# Patient Record
Sex: MALE | Employment: UNEMPLOYED | URBAN - METROPOLITAN AREA
[De-identification: names, ages, dates, MRNs, and addresses within clinical notes are randomized per-mention and may not be internally consistent; named-entity substitution may affect disease eponyms.]

---

## 2019-04-06 ENCOUNTER — HOSPITAL ENCOUNTER (INPATIENT)
Facility: CLINIC | Age: 59
LOS: 2 days | Discharge: NURSING, REHAB OR LONG-TERM CARE | End: 2019-04-08
Attending: PSYCHIATRY & NEUROLOGY | Admitting: PSYCHIATRY & NEUROLOGY
Payer: COMMERCIAL

## 2019-04-06 ENCOUNTER — HOSPITAL ENCOUNTER (EMERGENCY)
Facility: CLINIC | Age: 59
Discharge: PSYCHIATRIC OR SUBSTANCE USE FACILITY | End: 2019-04-06
Attending: EMERGENCY MEDICINE | Admitting: EMERGENCY MEDICINE
Payer: COMMERCIAL

## 2019-04-06 VITALS
RESPIRATION RATE: 18 BRPM | OXYGEN SATURATION: 98 % | HEIGHT: 73 IN | DIASTOLIC BLOOD PRESSURE: 81 MMHG | HEART RATE: 91 BPM | BODY MASS INDEX: 32.6 KG/M2 | TEMPERATURE: 99.3 F | WEIGHT: 246 LBS | SYSTOLIC BLOOD PRESSURE: 145 MMHG

## 2019-04-06 DIAGNOSIS — R45.851 SUICIDAL IDEATION: ICD-10-CM

## 2019-04-06 DIAGNOSIS — E11.8 TYPE 2 DIABETES MELLITUS WITH COMPLICATION, WITHOUT LONG-TERM CURRENT USE OF INSULIN (H): Primary | ICD-10-CM

## 2019-04-06 DIAGNOSIS — F22 PARANOIA (H): ICD-10-CM

## 2019-04-06 LAB
ANION GAP SERPL CALCULATED.3IONS-SCNC: 6 MMOL/L (ref 3–14)
BUN SERPL-MCNC: 11 MG/DL (ref 7–30)
CALCIUM SERPL-MCNC: 8.6 MG/DL (ref 8.5–10.1)
CHLORIDE SERPL-SCNC: 107 MMOL/L (ref 94–109)
CO2 SERPL-SCNC: 26 MMOL/L (ref 20–32)
CREAT SERPL-MCNC: 1.02 MG/DL (ref 0.66–1.25)
ERYTHROCYTE [DISTWIDTH] IN BLOOD BY AUTOMATED COUNT: 13.1 % (ref 10–15)
GFR SERPL CREATININE-BSD FRML MDRD: 81 ML/MIN/{1.73_M2}
GLUCOSE BLDC GLUCOMTR-MCNC: 144 MG/DL (ref 70–99)
GLUCOSE BLDC GLUCOMTR-MCNC: 173 MG/DL (ref 70–99)
GLUCOSE BLDC GLUCOMTR-MCNC: 230 MG/DL (ref 70–99)
GLUCOSE SERPL-MCNC: 122 MG/DL (ref 70–99)
HBA1C MFR BLD: 8.2 % (ref 0–5.6)
HCT VFR BLD AUTO: 37.9 % (ref 40–53)
HGB BLD-MCNC: 13.4 G/DL (ref 13.3–17.7)
MCH RBC QN AUTO: 30 PG (ref 26.5–33)
MCHC RBC AUTO-ENTMCNC: 35.4 G/DL (ref 31.5–36.5)
MCV RBC AUTO: 85 FL (ref 78–100)
PLATELET # BLD AUTO: 220 10E9/L (ref 150–450)
POTASSIUM SERPL-SCNC: 3.3 MMOL/L (ref 3.4–5.3)
RBC # BLD AUTO: 4.47 10E12/L (ref 4.4–5.9)
SODIUM SERPL-SCNC: 139 MMOL/L (ref 133–144)
WBC # BLD AUTO: 7.9 10E9/L (ref 4–11)

## 2019-04-06 PROCEDURE — 00000146 ZZHCL STATISTIC GLUCOSE BY METER IP

## 2019-04-06 PROCEDURE — 90791 PSYCH DIAGNOSTIC EVALUATION: CPT

## 2019-04-06 PROCEDURE — 99223 1ST HOSP IP/OBS HIGH 75: CPT | Mod: AI | Performed by: PSYCHIATRY & NEUROLOGY

## 2019-04-06 PROCEDURE — 12400002 ZZH R&B MH SENIOR/ADOLESCENT

## 2019-04-06 PROCEDURE — 25000132 ZZH RX MED GY IP 250 OP 250 PS 637: Performed by: PHYSICIAN ASSISTANT

## 2019-04-06 PROCEDURE — 85027 COMPLETE CBC AUTOMATED: CPT | Performed by: EMERGENCY MEDICINE

## 2019-04-06 PROCEDURE — 99285 EMERGENCY DEPT VISIT HI MDM: CPT | Mod: 25

## 2019-04-06 PROCEDURE — 80048 BASIC METABOLIC PNL TOTAL CA: CPT | Performed by: EMERGENCY MEDICINE

## 2019-04-06 PROCEDURE — 99232 SBSQ HOSP IP/OBS MODERATE 35: CPT | Performed by: PHYSICIAN ASSISTANT

## 2019-04-06 PROCEDURE — 83036 HEMOGLOBIN GLYCOSYLATED A1C: CPT | Performed by: EMERGENCY MEDICINE

## 2019-04-06 PROCEDURE — 99207 ZZC CDG-HISTORY COMP: MEETS EXP. PROBLEM FOCUSED - DOWN CODED LACK OF HPI: CPT | Performed by: PHYSICIAN ASSISTANT

## 2019-04-06 PROCEDURE — 25000132 ZZH RX MED GY IP 250 OP 250 PS 637: Performed by: PSYCHIATRY & NEUROLOGY

## 2019-04-06 RX ORDER — OLANZAPINE 10 MG/1
10 TABLET ORAL
Status: DISCONTINUED | OUTPATIENT
Start: 2019-04-06 | End: 2019-04-08 | Stop reason: HOSPADM

## 2019-04-06 RX ORDER — HYDROXYZINE HYDROCHLORIDE 25 MG/1
25 TABLET, FILM COATED ORAL EVERY 4 HOURS PRN
Status: DISCONTINUED | OUTPATIENT
Start: 2019-04-06 | End: 2019-04-08 | Stop reason: HOSPADM

## 2019-04-06 RX ORDER — OLANZAPINE 10 MG/2ML
10 INJECTION, POWDER, FOR SOLUTION INTRAMUSCULAR
Status: DISCONTINUED | OUTPATIENT
Start: 2019-04-06 | End: 2019-04-08 | Stop reason: HOSPADM

## 2019-04-06 RX ORDER — DEXTROSE MONOHYDRATE 25 G/50ML
25-50 INJECTION, SOLUTION INTRAVENOUS
Status: DISCONTINUED | OUTPATIENT
Start: 2019-04-06 | End: 2019-04-08 | Stop reason: HOSPADM

## 2019-04-06 RX ORDER — ACETAMINOPHEN 325 MG/1
650 TABLET ORAL EVERY 4 HOURS PRN
Status: DISCONTINUED | OUTPATIENT
Start: 2019-04-06 | End: 2019-04-08 | Stop reason: HOSPADM

## 2019-04-06 RX ORDER — TRAZODONE HYDROCHLORIDE 50 MG/1
50 TABLET, FILM COATED ORAL
Status: DISCONTINUED | OUTPATIENT
Start: 2019-04-06 | End: 2019-04-08 | Stop reason: HOSPADM

## 2019-04-06 RX ORDER — GABAPENTIN 600 MG/1
600 TABLET ORAL 3 TIMES DAILY
Status: DISCONTINUED | OUTPATIENT
Start: 2019-04-06 | End: 2019-04-08 | Stop reason: HOSPADM

## 2019-04-06 RX ORDER — BISACODYL 10 MG
10 SUPPOSITORY, RECTAL RECTAL DAILY PRN
Status: DISCONTINUED | OUTPATIENT
Start: 2019-04-06 | End: 2019-04-08 | Stop reason: HOSPADM

## 2019-04-06 RX ORDER — ALUMINA, MAGNESIA, AND SIMETHICONE 2400; 2400; 240 MG/30ML; MG/30ML; MG/30ML
30 SUSPENSION ORAL EVERY 4 HOURS PRN
Status: DISCONTINUED | OUTPATIENT
Start: 2019-04-06 | End: 2019-04-08 | Stop reason: HOSPADM

## 2019-04-06 RX ORDER — ARIPIPRAZOLE 5 MG/1
10 TABLET ORAL DAILY
Status: DISCONTINUED | OUTPATIENT
Start: 2019-04-07 | End: 2019-04-08 | Stop reason: HOSPADM

## 2019-04-06 RX ORDER — GABAPENTIN 100 MG/1
CAPSULE ORAL
Status: ON HOLD | COMMUNITY
End: 2019-04-08

## 2019-04-06 RX ORDER — NICOTINE POLACRILEX 4 MG
15-30 LOZENGE BUCCAL
Status: DISCONTINUED | OUTPATIENT
Start: 2019-04-06 | End: 2019-04-08 | Stop reason: HOSPADM

## 2019-04-06 RX ORDER — POTASSIUM CHLORIDE 1500 MG/1
40 TABLET, EXTENDED RELEASE ORAL ONCE
Status: COMPLETED | OUTPATIENT
Start: 2019-04-06 | End: 2019-04-06

## 2019-04-06 RX ADMIN — GABAPENTIN 600 MG: 600 TABLET, FILM COATED ORAL at 20:48

## 2019-04-06 RX ADMIN — METFORMIN HYDROCHLORIDE 1000 MG: 500 TABLET, FILM COATED ORAL at 17:52

## 2019-04-06 RX ADMIN — POTASSIUM CHLORIDE 40 MEQ: 20 TABLET, EXTENDED RELEASE ORAL at 16:39

## 2019-04-06 RX ADMIN — GABAPENTIN 600 MG: 600 TABLET, FILM COATED ORAL at 16:39

## 2019-04-06 SDOH — HEALTH STABILITY: MENTAL HEALTH: HOW OFTEN DO YOU HAVE A DRINK CONTAINING ALCOHOL?: NEVER

## 2019-04-06 ASSESSMENT — ACTIVITIES OF DAILY LIVING (ADL)
AMBULATION: 0-->INDEPENDENT
SWALLOWING: 0-->SWALLOWS FOODS/LIQUIDS WITHOUT DIFFICULTY
COGNITION: 0 - NO COGNITION ISSUES REPORTED
HYGIENE/GROOMING: INDEPENDENT
TOILETING: 0-->INDEPENDENT
RETIRED_EATING: 0-->INDEPENDENT
RETIRED_COMMUNICATION: 0-->UNDERSTANDS/COMMUNICATES WITHOUT DIFFICULTY
LAUNDRY: WITH SUPERVISION
DRESS: INDEPENDENT
BATHING: 0-->INDEPENDENT
FALL_HISTORY_WITHIN_LAST_SIX_MONTHS: NO
DRESS: 0-->INDEPENDENT
TRANSFERRING: 0-->INDEPENDENT
ORAL_HYGIENE: INDEPENDENT

## 2019-04-06 ASSESSMENT — MIFFLIN-ST. JEOR
SCORE: 1999.73
SCORE: 1931.69

## 2019-04-06 ASSESSMENT — ENCOUNTER SYMPTOMS
CONFUSION: 0
NERVOUS/ANXIOUS: 1
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
## 2019-04-06 NOTE — CONSULTS
"  [facility]  Internal Medicine Consult     Brody Colindres MRN# [id number]   Age: 58 year old YOB: 1960     Date of Admission: 4/6/2019  Date of Consult: 4/6/2019    Primary Care Provider: No primary care provider on file.    Requesting Service: Dr. Rcie  Reason for Consult: General Medical Evaluation      SUBJECTIVE   CC:   DMII   Assessment and Plan/Recommendations:   Brody Colindres is a 58 year old male with history of DMII, chronic pain, and paranoia who was admitted to station 3[facility] with worsening paranoia    Worsening paranoia: Presented to the ED with concerns of someone trying to kill him. Per notes was hospitalized in [place] last year with the same concerns   - Management per psych     DMII: PTA on Metformin only. No A1c on file. Glucose stable  - Please resume Metformin  - Glucose checks bid, hypoglycemia protocol     Hypokalemia: Mild. K 3.3 in the ED. Agreeable to replacement but refuses recheck. Give 40 mEq today. Contact medicine with acute changes     Chronic pain: Continue PTA gabapentin       Currently, medically stable and internal medicine will sign off. Please contact if future questions or concerns arise. Thank you for the opportunity to be a part of this patient's care.      Rachel Cheatham  Internal Medicine [facility] Hospitalist  [phone]  April 6, 2019         HPI:   Brody Colindres is a 58 year old male with history of DMII, chronic pain, and paranoia who was admitted to station 3B with worsening paranoia    Patient reports he is tired and doesn't really want to talk. He denies pain. Says he is, \"sometimes\" complaint with Metformin PTA. Reports he is hungry. Does not want to have lab values rechecked. Denies fever or chills. No chest pain or dyspnea. Asks to be left alone       Past Medical History:     Past Medical History:   Diagnosis Date     Diabetes (H)         Reviewed and updated in [facility].     Past Surgical History:    No past surgical history on " "file.      Social History:   Tobacco use: Deferred  Alcohol use: Deferred  Elicit drug use: Deferred     Family History:   No family history on file.   Unable to obtain      Allergies:   No Known Allergies      Medications:   Reviewed. Please see MAR     Review of Systems:   10 point ROS of systems including Constitutional, Eyes, Respiratory, Cardiovascular, Gastroenterology, Genitourinary, Integumentary, Muscularskeletal, Psychiatric were all negative except for pertinent positives noted in my HPI.    OBJECTIVE   Physical Exam:   Vitals were reviewed  Height 1.854 m (6' 1\"), weight 104.8 kg (231 lb).  General: Alert, lying in bed. Pleasant   HEENT: Anicteric sclera, membranes moist  Cardiovascular: RRR, S1S2. No murmur noted  Lungs: CTAB without wheezing or crackles   GI: Abdomen soft, non-tender with bowel sounds present. No guarding or rebound present  Vascular: Refused  Neurologic: Refused  Neuropsychiatric: Per psych  Skin: No jaundice, rashes, or lesions on exposed skin         Data:        Lab Results   Component Value Date     04/06/2019    Lab Results   Component Value Date    CHLORIDE 107 04/06/2019    Lab Results   Component Value Date    BUN 11 04/06/2019      Lab Results   Component Value Date    POTASSIUM 3.3 04/06/2019    Lab Results   Component Value Date    CO2 26 04/06/2019    Lab Results   Component Value Date    CR 1.02 04/06/2019        Lab Results   Component Value Date    WBC 7.9 04/06/2019    HGB 13.4 04/06/2019    HCT 37.9 (L) 04/06/2019    MCV 85 04/06/2019     04/06/2019     Lab Results   Component Value Date    WBC 7.9 04/06/2019            "
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
## 2019-04-06 NOTE — PROGRESS NOTES
A             White shoes, cigarattes, lighter, grey hooded jacket,  Black winter hooded jacket, belt, jeans, white t shirt x 2, black socks, tan undershirt long sleeve x 2, tan long underwear, black t shirt  X 2, plastic bag with assortment of toiletries, I grey backpack    At security:     3 bottles pills  1mn ID card  2x [place] E[place] card  [place] bus card  Social security card  2 x visa 0536 and 0034  3x mastercard [id number], [id number], [id number]                    Admission:  I am responsible for any personal items that are not sent to the safe or pharmacy.  [facility] is not responsible for loss, theft or damage of any property in my possession.    Signature:  _________________________________ Date: _______  Time: _____                                              Staff Signature:  ____________________________ Date: ________  Time: _____      2nd Staff person, if patient is unable/unwilling to sign:    Signature: ________________________________ Date: ________  Time: _____     Discharge:  [facility] has returned all of my personal belongings:    Signature: _________________________________ Date: ________  Time: _____                                          Staff Signature:  ____________________________ Date: ________  Time: _____

## 2019-04-06 NOTE — PROGRESS NOTES
"Pt admitted from [facility] for paranoia and SI. \"I thought people were trying to kill me.\" He acknowledges SI yesterday, but currently does not have any SI.  Denies prior SA. Calm, cooperative, but very flat and very guarded upon admission. He was oriented to the unit and imeediatekly fell asleep after admission. His friend \"B\" was called and notified that he was in the hospital per his request.   "

## 2019-04-06 NOTE — PLAN OF CARE
OT General Care Plan  OT Goal 1  Description  Will attend OT groups and set OT goal focus.   4/6/2019 1541 by Marlyn Melgar, OT  Note  Pt has not attended OT groups yet. They will be encouraged to attend groups and be provided a Self Assessment form.  OT staff will explain the value of OT, including them in their treatment plan and offer options to meet their needs and identify goals.

## 2019-04-06 NOTE — H&P
"[facility]  Psychiatric History and Physical      Patient name: Brody Colindres   MRN: [id number]    Age: 58 year old    YOB: 1960    Identifying information:   The patient is a 58 year old -American male.  He is currently a limited historian due to his willingness to cooperate with interview questions which is likely stemming from his mental health condition.    Chief complaint:  \" I guess it is time to just moved to a different town.\"    History of present illness: The patient has a history of psychosis involving prominent paranoia who was brought to the emergency room by EMS for mental health evaluation.  Records indicate that the patient had entered a police station to report paranoid concerns that he was being followed and possibly targeted to be killed.  Apparently has concerns seemed irrational and he was brought to the emergency room for a mental health evaluation.  Patient continued to endorse similar paranoid concerns and various other cities where he has resided in over the past year.  He reported relocating to different cities throughout the [place] due to the severe.  On examination today, he continued to express similar concerns and verbalizes a plan to relocate yet again to another city in order to avoid being killed or harmed.  Emergency room records indicated that the patient had endorsed cocaine use the day prior to his arrival.  The patient also confirmed this on examination however became guarded and would not elaborate regarding the frequency or extent of his usage.  He denied any other illicit substance use.  The patient also identified suicidal ideation secondary to distress stemming from the concerns mentioned above.  He feels fairly safe in the hospital today.  He was ambivalent regarding trying a medication to alleviate these anxious concerns.    Psychiatric Review of Systems:    -- Depressive episode: Endorsed depressed " mood which seem to be related to psychotic symptoms.  He endorsed suicidal thoughts however was able to contract for safety on the unit.  He denied homicidal ideation.  No vegetative symptoms reported.  He denied feeling helpless or hopeless.  -- Holley:  denies symptoms  -- Psychosis: He endorsed paranoid delusions however did not report auditory or visual hallucinations.  -- Anxiety: denies symptoms corresponding to LEO or panic disorder  -- PTSD: denies symptoms  -- OCD: denies  symptoms  -- Eating disorder: denies symptoms    Psychiatric History:    He recalls multiple prior inpatient hospitalizations related to a similar presentation.  He explains that medications have been prescribed to him however he was not able to clarify what medications they were.  He denied prior suicide attempts however did confirm prior suicidal ideation and a similar context as mentioned above.  He currently does not have any outpatient mental health providers.    Substance Use History:    He reported occasional cocaine usage however would not provide further details.  He denied significant alcohol use.  He denied any other illicit substance use.  He did not report any prior admissions to detox or treatment.    Medical History: Diabetes mellitus type 2      No current facility-administered medications on file prior to encounter.   Current Outpatient Medications on File Prior to Encounter:  gabapentin (NEURONTIN) 100 MG capsule    metFORMIN (GLUCOPHAGE) 1000 MG tablet         Social History:  Refer to the psychosocial assessment completed by our .     Family History:    The patient denied a family history of mental illnesses or suicide attempts    Medical review of systems: 10 systems were reviewed and positive for psychiatric symptoms as noted above otherwise negative    Physical Exam:    B/P: 142/77, T: 96.5, P: 78, R: 16  Estimated body mass index is 30.48 kg/m  as calculated from the following:    Height as of this  "encounter: 1.854 m (6' 1\").    Weight as of this encounter: 104.8 kg (231 lb).    The rest of the physical examination was reviewed in the emergency room note completed by the emergency room physician.      Mental status examination:  Appearance:  Alert, fair hygiene, no acute distress  Attitude: Guarded  Eye Contact: Poor  Mood:  Depressed and anxious  Affect: Mood congruent and blunted  Speech: Limited in content.  Frequent pauses.  Psychomotor Behavior:  No psychomotor abnormalities noted  Thought Process: Mildly disorganized.  Associations:  Logical; no loose associations Noted  Thought Content:   He endorsed passive suicidal ideation.  Endorsed paranoid delusions.  Denied homicidal ideation.  Denied auditory and visual hallucinations.  Insight: Limited  Judgment: Limited  Oriented to:  time, person, and place  Attention Span and Concentration: Limited  Recent and Remote Memory: Intact based on interviewing and details provided  Language: Appropriate based on interviewing  Fund of Knowledge: Appropriate based on interviewing  Muscle Strength and Tone: Normal upon visual inspection  Gait and Station: Normal upon visual inspection            Diagnoses:    Unspecified schizophrenia spectrum disorder (rule out a primary psychotic illness versus a substance-induced psychotic disorder related to stimulant use or other unreported substance use)  Diabetes mellitus type 2     Plan:  1.  The patient has been admitted to our behavioral health unit a 72-hour hold for reports of psychosis and suicidal ideation.  His hold will be continued over the weekend until he is reassessed by his primary team on Monday.    2.  Abilify will be added at a dose of 10 mg daily targeting psychosis.  Monitor response and tolerability.    3. Psychosocial treatments to be addressed with social work consult and groups.    "

## 2019-04-06 NOTE — ED NOTES
Bed: [facility]  Expected date:   Expected time:   Means of arrival:   Comments:  Contact clean.  EVS contacted [phone]

## 2019-04-06 NOTE — ED NOTES
Bed: [facility]  Expected date: 4/6/19  Expected time: 12:05 AM  Means of arrival: Ambulance  Comments:  SHIVAM  [phone] [id number] suicidal

## 2019-04-07 PROCEDURE — 25000132 ZZH RX MED GY IP 250 OP 250 PS 637: Performed by: PSYCHIATRY & NEUROLOGY

## 2019-04-07 PROCEDURE — 12400002 ZZH R&B MH SENIOR/ADOLESCENT

## 2019-04-07 RX ADMIN — METFORMIN HYDROCHLORIDE 1000 MG: 500 TABLET, FILM COATED ORAL at 18:19

## 2019-04-07 RX ADMIN — HYDROXYZINE HYDROCHLORIDE 25 MG: 25 TABLET ORAL at 20:42

## 2019-04-07 RX ADMIN — GABAPENTIN 600 MG: 600 TABLET, FILM COATED ORAL at 19:52

## 2019-04-07 ASSESSMENT — ACTIVITIES OF DAILY LIVING (ADL)
ORAL_HYGIENE: INDEPENDENT
LAUNDRY: WITH SUPERVISION
HYGIENE/GROOMING: INDEPENDENT
DRESS: SCRUBS (BEHAVIORAL HEALTH);INDEPENDENT
LAUNDRY: WITH SUPERVISION
HYGIENE/GROOMING: INDEPENDENT
ORAL_HYGIENE: INDEPENDENT
DRESS: INDEPENDENT

## 2019-04-07 ASSESSMENT — MIFFLIN-ST. JEOR: SCORE: 1950.74

## 2019-04-07 NOTE — PROGRESS NOTES
04/07/19 1420   Behavioral Health   Hallucinations denies / not responding to hallucinations   Thinking intact   Orientation person: oriented;place: oriented;date: oriented;time: oriented   Memory baseline memory   Insight poor   Judgement impaired   Eye Contact at floor   Affect blunted, flat   Mood mood is calm   Physical Appearance/Attire attire appropriate to age and situation   Hygiene neglected grooming - unclean body, hair, teeth   Suicidality other (see comments)  (denies)   1. Wish to be Dead No   2. Non-Specific Active Suicidal Thoughts  No   Self Injury other (see comment)  (none)   Elopement Statements about wanting to leave  (Pt looking out the windows of doors and pacing by doors)   Activity isolative;withdrawn   Speech clear;coherent   Medication Sensitivity no stated side effects;no observed side effects   Psychomotor / Gait balanced;steady   Psycho Education   Type of Intervention 1:1 intervention   Response participates with encouragement   Hours 0.5   Treatment Detail check in   Activities of Daily Living   Hygiene/Grooming independent   Oral Hygiene independent   Dress independent   Laundry with supervision   Room Organization independent     Pt spent most of the shift in his room. Pt did spend some time in the lounge watching TV. Pt is minimally social with other pts and staff. Pt denied any anxious or depressed feelings. Pt did not eat his breakfast tray but did eat his lunch. Pt paced the halls for about 15 minutes and was noticed to be looking through the windows on the doors. Pt did not voice any concerns to the writer.

## 2019-04-07 NOTE — PROGRESS NOTES
Pt seen in his room at start of shift. Cooperative with blood glucose testing and medication. Polite on approach, expresses understanding of monitoring of glucose levels. Denies that he had been on a mental health unit before; he accepts restrictions of some items as part of unit safety protocol. Up for dinner, completed menu. In lounge watching tv. Denies thoughts of self harm.  2200: Pt has been out of his room most of the evening; little socialization with peers. Appetite good, watching tv. Remains polite and med compliant.

## 2019-04-07 NOTE — PROGRESS NOTES
"Pt sitting out in the lounge watching TV. Covering his face with his hands when approached by this writer to introduce self to pt. Pt did not respond to writer. Pt sitting calmly until approached for blood sugar which he refused. Pt anxiously moving legs about when sitting when staff present. Writer identified self and presented pt his medications which he refused and stated \"I want to get out of here\". Writer stated he could talk with On call Psychiatrist.  "
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
## 2019-04-08 VITALS
SYSTOLIC BLOOD PRESSURE: 147 MMHG | OXYGEN SATURATION: 100 % | RESPIRATION RATE: 16 BRPM | DIASTOLIC BLOOD PRESSURE: 75 MMHG | HEIGHT: 73 IN | BODY MASS INDEX: 31.17 KG/M2 | WEIGHT: 235.2 LBS | TEMPERATURE: 98.2 F | HEART RATE: 81 BPM

## 2019-04-08 LAB — GLUCOSE BLDC GLUCOMTR-MCNC: 133 MG/DL (ref 70–99)

## 2019-04-08 PROCEDURE — 99238 HOSP IP/OBS DSCHRG MGMT 30/<: CPT | Performed by: NURSE PRACTITIONER

## 2019-04-08 PROCEDURE — 00000146 ZZHCL STATISTIC GLUCOSE BY METER IP

## 2019-04-08 PROCEDURE — 25000132 ZZH RX MED GY IP 250 OP 250 PS 637: Performed by: PSYCHIATRY & NEUROLOGY

## 2019-04-08 RX ORDER — ARIPIPRAZOLE 10 MG/1
10 TABLET ORAL DAILY
Start: 2019-04-09 | End: 2019-04-08

## 2019-04-08 RX ORDER — ARIPIPRAZOLE 10 MG/1
10 TABLET ORAL DAILY
Qty: 30 TABLET | Refills: 0 | Status: SHIPPED | OUTPATIENT
Start: 2019-04-09 | End: 2020-09-03

## 2019-04-08 RX ORDER — GABAPENTIN 600 MG/1
600 TABLET ORAL 3 TIMES DAILY
Start: 2019-04-08 | End: 2019-04-08

## 2019-04-08 RX ORDER — GABAPENTIN 600 MG/1
600 TABLET ORAL 3 TIMES DAILY
Qty: 90 TABLET | Refills: 0 | Status: SHIPPED | OUTPATIENT
Start: 2019-04-08 | End: 2019-05-08

## 2019-04-08 RX ADMIN — GABAPENTIN 600 MG: 600 TABLET, FILM COATED ORAL at 08:56

## 2019-04-08 RX ADMIN — ARIPIPRAZOLE 10 MG: 5 TABLET ORAL at 08:57

## 2019-04-08 RX ADMIN — METFORMIN HYDROCHLORIDE 1000 MG: 500 TABLET, FILM COATED ORAL at 08:57

## 2019-04-08 ASSESSMENT — ACTIVITIES OF DAILY LIVING (ADL)
DRESS: INDEPENDENT
ORAL_HYGIENE: INDEPENDENT
HYGIENE/GROOMING: INDEPENDENT

## 2019-04-08 NOTE — PROGRESS NOTES
Pt was visible in the milieu the majority of the shift, but is withdrawn from interactions from peers. Pt is tense, flat and irritable. Pt gives one word responses to questions and does not offer up information. Pt was compliant with all scheduled medication and took hydroxyzine 25 PRN before bedtime. Pt refused to check in with writer, but denied any major concerns.

## 2019-04-08 NOTE — PROGRESS NOTES
Spoke with pt in order to have him sign consent form.  Pt refused and will discharge from the unit after his belongings have been returned to him from security.

## 2019-04-08 NOTE — DISCHARGE SUMMARY
Psychiatric Discharge Summary    Brody Colindres MRN# [id number]   Age: 58 year old YOB: 1960     Date of Admission:  4/6/2019  Date of Discharge:  4/8/2019 11:52 AM  Admitting Provider:  Mahesh Rice MD  Discharge Provider:  AILYN Kearney CNP         Event Leading to Hospitalization:   The patient has a history of psychosis involving prominent paranoia who was brought to the emergency room by EMS for mental health evaluation.  Records indicate that the patient had entered a police station to report paranoid concerns that he was being followed and possibly targeted to be killed.  Apparently has concerns seemed irrational and he was brought to the emergency room for a mental health evaluation.  Patient continued to endorse similar paranoid concerns and various other cities where he has resided in over the past year.  He reported relocating to different cities throughout the [place] due to the severe.  On examination today, he continued to express similar concerns and verbalizes a plan to relocate yet again to another city in order to avoid being killed or harmed.  Emergency room records indicated that the patient had endorsed cocaine use the day prior to his arrival.  The patient also confirmed this on examination however became guarded and would not elaborate regarding the frequency or extent of his usage.  He denied any other illicit substance use.  The patient also identified suicidal ideation secondary to distress stemming from the concerns mentioned above.  He feels fairly safe in the hospital today.  He was ambivalent regarding trying a medication to alleviate these anxious concerns.      See Admission note by Colin Hammond MD on 4/6/19.          DIagnoses:   Unspecified schizophrenia spectrum disorder (rule out a primary psychotic illness versus a substance-induced psychotic disorder related to stimulant use or other unreported substance use)  Diabetes mellitus type  2          Labs:     Recent Results (from the past 168 hour(s))   Glucose by meter    Collection Time: 04/06/19 12:33 AM   Result Value Ref Range    Glucose 230 (H) 70 - 99 mg/dL   CBC (+ platelets, no diff)    Collection Time: 04/06/19  6:30 AM   Result Value Ref Range    WBC 7.9 4.0 - 11.0 10e9/L    RBC Count 4.47 4.4 - 5.9 10e12/L    Hemoglobin 13.4 13.3 - 17.7 g/dL    Hematocrit 37.9 (L) 40.0 - 53.0 %    MCV 85 78 - 100 fl    MCH 30.0 26.5 - 33.0 pg    MCHC 35.4 31.5 - 36.5 g/dL    RDW 13.1 10.0 - 15.0 %    Platelet Count 220 150 - 450 10e9/L   Basic metabolic panel    Collection Time: 04/06/19  6:30 AM   Result Value Ref Range    Sodium 139 133 - 144 mmol/L    Potassium 3.3 (L) 3.4 - 5.3 mmol/L    Chloride 107 94 - 109 mmol/L    Carbon Dioxide 26 20 - 32 mmol/L    Anion Gap 6 3 - 14 mmol/L    Glucose 122 (H) 70 - 99 mg/dL    Urea Nitrogen 11 7 - 30 mg/dL    Creatinine 1.02 0.66 - 1.25 mg/dL    GFR Estimate 81 >60 mL/min/[1.73_m2]    GFR Estimate If Black >90 >60 mL/min/[1.73_m2]    Calcium 8.6 8.5 - 10.1 mg/dL   Hemoglobin A1c    Collection Time: 04/06/19  6:30 AM   Result Value Ref Range    Hemoglobin A1C 8.2 (H) 0 - 5.6 %   Glucose by meter    Collection Time: 04/06/19 11:18 AM   Result Value Ref Range    Glucose 144 (H) 70 - 99 mg/dL   Glucose by meter    Collection Time: 04/06/19  4:46 PM   Result Value Ref Range    Glucose 173 (H) 70 - 99 mg/dL   Glucose by meter    Collection Time: 04/08/19  7:53 AM   Result Value Ref Range    Glucose 133 (H) 70 - 99 mg/dL              Consults:   Consultation during this admission received from internal medicine         Hospital Course:   Brody Colindres was admitted to Station [address] [place] [address] with attending Melita ROBERTSON CNP, on a 72 hour mental health hold. The patient was placed under status 15 (15 minute checks) to ensure patient safety.     The following medication changes took place: Add Abilify. Increase Gabapentin and Metformin.   The patient tolerated  medications well. The patient refused to answer questions regarding his mental health. Denied any issues. Irritable when suggested that he appear paranoid. The patient requested to be discharged as he did not feel that he needs help. At this point, the patient did not meet criteria for involuntary hospitalization, thus he was discharged to the community with prescriptions for Abilify, Gabapentin and Metformin.     Brody Colindres was released to street. Patient tresides at homeless shelters. . At the time of this encounter, Brody Colindres was determined to not be a danger to himself or others and symptoms did not meet criteria for involuntary hospitalization.      Safety plan, post discharge recommendations and relapse prevention were discussed with the patient. The patient agreed to call 911 or present to ED if symptoms worsen or developed thoughts of suicide, self harm or homicide.  The patient agreed to continue medications and outpatient care.         Discharge Medications:     Discharge Medication List as of 4/8/2019 11:24 AM      CONTINUE these medications which have CHANGED    Details   ARIPiprazole (ABILIFY) 10 MG tablet Take 1 tablet (10 mg) by mouth daily, Disp-30 tablet, R-0, Local Print      gabapentin (NEURONTIN) 600 MG tablet Take 1 tablet (600 mg) by mouth 3 times daily, Disp-90 tablet, R-0, Local Print      metFORMIN (GLUCOPHAGE) 1000 MG tablet Take 1 tablet (1,000 mg) by mouth 2 times daily (with meals), Disp-60 tablet, R-0, Local Print         STOP taking these medications       gabapentin (NEURONTIN) 100 MG capsule Comments:   Reason for Stopping:                    Psychiatric and Physical Examinations:   Appearance:  awake, alert, uncooperative, no apparent distress and mildly obese  Attitude:  uncooperative  Eye Contact:  poor   Mood:  did not respond  Affect:  intensity is heightened, labile and guarded  Speech:  clear, coherent  Psychomotor Behavior:  no evidence of tardive dyskinesia,  dystonia, or tics  Thought Process:  linear and illogical  Associations:  no loose associations  Thought Content:  no evidence of suicidal ideation or homicidal ideation, no auditory hallucinations present and no visual hallucinations present  Insight:  fair  Judgment:  fair  Oriented to:  did not respond  Attention Span and Concentration:  intact  Recent and Remote Memory:  unable to assess  Language and Fund of Knowledge: low-normal  Muscle Strength and Tone: appear to have no difficulties. Refuse exam.   Gait and Station: Normal  Vitals:    04/07/19 1221 04/07/19 1600 04/07/19 2000 04/08/19 0830   BP: 140/71 128/78 151/70 147/75   Pulse: 75 99 77 81   Resp: 16 16 16 16   Temp: 97.4  F (36.3  C) 98.4  F (36.9  C) 97.7  F (36.5  C) 98.2  F (36.8  C)   TempSrc: Tympanic Tympanic Tympanic Tympanic   SpO2: 100% 99% 99% 100%   Weight:       Height:                Discharge Plan:     Follow up Appointment:  The patient was discharge against medical advise.     [facility]   [address] [place], [place]  [phone]    PCP:    Dr. López Alvarez - Monday, April 15th at 10:15am. Please arrive 15 min  [facility]   [address] [place] [address]  [phone]    Attestation:  The patient has been seen and evaluated by me,  Melita ROBERTSON, CNP

## 2019-04-08 NOTE — PLAN OF CARE
Pt denies suicidal ideation or hallucinations. Pt states that he wants to be discharged. Pt was medication complaint this am and spoke to writer. Pt states he understands discharge instructions and feels ready to be discharged. Pt states he has resources for shelter but would not provide information. Pt states his mood is good. Affect is flat but pt is more engaged in conversation. Pt would not identify a coping skill.

## 2019-04-08 NOTE — DISCHARGE INSTRUCTIONS
Behavioral Discharge Planning and Instructions      Summary:  You were admitted on 4/6/2019  due to paranoia and Suicidal Ideations.  You were treated by Dr. Mahesh Rice MD and discharged on 04/08/2019 from Unit [facility] to:    [facility]   [address] [place], [place]  [phone]      Principal Diagnosis:   Unspecified schizophrenia spectrum disorder (rule out a primary psychotic illness versus a substance-induced psychotic disorder related to stimulant use or other unreported substance use)    Diabetes mellitus type 2        Health Care Follow-up Appointments:   PCP:    Dr. López Alvarez - Monday, April 15th at 10:15am. Please arrive 15 min  [address] [id number] [phone]         Attend all scheduled appointments with your outpatient providers. Call at least 24 hours in advance if you need to reschedule an appointment to ensure continued access to your outpatient providers.   Major Treatments, Procedures and Findings:  You were provided with: a psychiatric assessment, assessed for medical stability and medication evaluation and/or management    Symptoms to Report: feeling more aggressive, increased confusion, losing more sleep, mood getting worse or thoughts of suicide    Early warning signs can include: increased depression or anxiety sleep disturbances increased thoughts or behaviors of suicide or self-harm  increased unusual thinking, such as paranoia or hearing voices    Safety and Wellness:  Take all medicines as directed.  Make no changes unless your doctor suggests them.      Follow treatment recommendations.  Refrain from alcohol and non-prescribed drugs.  If there is a concern for safety, call 911.    Resources:   Crisis Intervention: 328.563.2478 or 417-724-3149 (TTY: 859.473.5477).  Call anytime for help.  National [place] on Mental Illness (www.[place].isamar.org): 786.460.2825 or 313-283-1164.  Alcoholics Anonymous (www.alcoholics-anonymous.org): Check  your phone book for your local chapter.  Suicide Awareness Voices of Education (SAVE) (www.save.org): 361-899-RVRG (3183)  National Suicide Prevention Line (www.mentalhealthmn.org): 144-463-PIVZ (2846)  Mental Health Consumer/Survivor Network of [place] (www.mhcsn.net): 271.503.9970 or 770-941-3191  Mental Health Association of [place] (www.mentalhealth.org): 429.902.7999 or 660-314-2804  Self- Management and Recovery Training., SMART-- Toll free: 474.721.7430  www.Giftly.Graduway  [facility] Crisis (COPE) Response - Adult [phone]      The treatment team has appreciated the opportunity to work with you.     If you have any questions or concerns our unit number is [phone].

## 2019-04-08 NOTE — PROGRESS NOTES
Pt at the desk requesting immediate discharge. Discharge order in place and 72 hour hold was canceled by Melita/NP.   AVS was reviewed with patient. Pt made aware of follow up appointment and recommended medications. Pt was given paper scripts for Abilify, gabapentin and metformin. Pt was also made aware of shelter information. Pt stated that he was going to buy a ticket and get out of here. Pt denied SI/SIB at time of discharge. All belongings were sent with patient.   Pt was escorted to the elevator and given directions to the lobby.  Pt reported that he knew how to get to the bus stop.

## 2020-07-26 ENCOUNTER — HOSPITAL ENCOUNTER (EMERGENCY)
Facility: CLINIC | Age: 60
Discharge: HOME | End: 2020-07-26
Attending: EMERGENCY MEDICINE | Admitting: EMERGENCY MEDICINE
Payer: MEDICAID

## 2020-07-26 VITALS
HEART RATE: 91 BPM | TEMPERATURE: 96.9 F | OXYGEN SATURATION: 100 % | SYSTOLIC BLOOD PRESSURE: 123 MMHG | DIASTOLIC BLOOD PRESSURE: 71 MMHG | RESPIRATION RATE: 18 BRPM

## 2020-07-26 DIAGNOSIS — R73.9 HYPERGLYCEMIA: ICD-10-CM

## 2020-07-26 DIAGNOSIS — R44.1 VISUAL HALLUCINATIONS: ICD-10-CM

## 2020-07-26 DIAGNOSIS — Z91.148 NONCOMPLIANCE WITH MEDICATION REGIMEN: ICD-10-CM

## 2020-07-26 DIAGNOSIS — F15.10 METHAMPHETAMINE ABUSE (H): ICD-10-CM

## 2020-07-26 DIAGNOSIS — R50.9 FEVER, UNSPECIFIED FEVER CAUSE: ICD-10-CM

## 2020-07-26 DIAGNOSIS — F14.10 COCAINE ABUSE (H): ICD-10-CM

## 2020-07-26 DIAGNOSIS — F22 PARANOIA (H): ICD-10-CM

## 2020-07-26 DIAGNOSIS — R00.0 SINUS TACHYCARDIA: ICD-10-CM

## 2020-07-26 LAB
ALBUMIN SERPL-MCNC: 4.4 G/DL (ref 3.4–5)
ALP SERPL-CCNC: 83 U/L (ref 40–150)
ALT SERPL W P-5'-P-CCNC: 29 U/L (ref 0–70)
AMPHETAMINES UR QL SCN: POSITIVE
ANION GAP SERPL CALCULATED.3IONS-SCNC: 6 MMOL/L (ref 3–14)
AST SERPL W P-5'-P-CCNC: 31 U/L (ref 0–45)
BARBITURATES UR QL: NEGATIVE
BASOPHILS # BLD AUTO: 0.1 10E9/L (ref 0–0.2)
BASOPHILS NFR BLD AUTO: 0.6 %
BENZODIAZ UR QL: NEGATIVE
BILIRUB SERPL-MCNC: 0.9 MG/DL (ref 0.2–1.3)
BUN SERPL-MCNC: 22 MG/DL (ref 7–30)
CALCIUM SERPL-MCNC: 9.5 MG/DL (ref 8.5–10.1)
CANNABINOIDS UR QL SCN: NEGATIVE
CHLORIDE SERPL-SCNC: 103 MMOL/L (ref 94–109)
CO2 SERPL-SCNC: 25 MMOL/L (ref 20–32)
COCAINE UR QL: POSITIVE
CREAT SERPL-MCNC: 1.24 MG/DL (ref 0.66–1.25)
DIFFERENTIAL METHOD BLD: ABNORMAL
EOSINOPHIL # BLD AUTO: 0 10E9/L (ref 0–0.7)
EOSINOPHIL NFR BLD AUTO: 0.1 %
ERYTHROCYTE [DISTWIDTH] IN BLOOD BY AUTOMATED COUNT: 13.4 % (ref 10–15)
ETHANOL UR QL SCN: NEGATIVE
GFR SERPL CREATININE-BSD FRML MDRD: 64 ML/MIN/{1.73_M2}
GLUCOSE SERPL-MCNC: 364 MG/DL (ref 70–99)
HCT VFR BLD AUTO: 39.4 % (ref 40–53)
HGB BLD-MCNC: 13.9 G/DL (ref 13.3–17.7)
IMM GRANULOCYTES # BLD: 0 10E9/L (ref 0–0.4)
IMM GRANULOCYTES NFR BLD: 0.4 %
LABORATORY COMMENT REPORT: NORMAL
LACTATE BLD-SCNC: 1.6 MMOL/L (ref 0.7–2)
LACTATE BLD-SCNC: 3.5 MMOL/L (ref 0.7–2)
LYMPHOCYTES # BLD AUTO: 1.4 10E9/L (ref 0.8–5.3)
LYMPHOCYTES NFR BLD AUTO: 14.4 %
MCH RBC QN AUTO: 30.1 PG (ref 26.5–33)
MCHC RBC AUTO-ENTMCNC: 35.3 G/DL (ref 31.5–36.5)
MCV RBC AUTO: 85 FL (ref 78–100)
MONOCYTES # BLD AUTO: 0.4 10E9/L (ref 0–1.3)
MONOCYTES NFR BLD AUTO: 4.2 %
NEUTROPHILS # BLD AUTO: 7.9 10E9/L (ref 1.6–8.3)
NEUTROPHILS NFR BLD AUTO: 80.3 %
NRBC # BLD AUTO: 0 10*3/UL
NRBC BLD AUTO-RTO: 0 /100
OPIATES UR QL SCN: NEGATIVE
PLATELET # BLD AUTO: 251 10E9/L (ref 150–450)
POTASSIUM SERPL-SCNC: 4.6 MMOL/L (ref 3.4–5.3)
PROT SERPL-MCNC: 9.4 G/DL (ref 6.8–8.8)
RBC # BLD AUTO: 4.62 10E12/L (ref 4.4–5.9)
SARS-COV-2 RNA SPEC QL NAA+PROBE: NEGATIVE
SARS-COV-2 RNA SPEC QL NAA+PROBE: NORMAL
SODIUM SERPL-SCNC: 134 MMOL/L (ref 133–144)
SPECIMEN SOURCE: NORMAL
SPECIMEN SOURCE: NORMAL
WBC # BLD AUTO: 9.8 10E9/L (ref 4–11)

## 2020-07-26 PROCEDURE — 83605 ASSAY OF LACTIC ACID: CPT | Performed by: EMERGENCY MEDICINE

## 2020-07-26 PROCEDURE — 25000128 H RX IP 250 OP 636: Performed by: EMERGENCY MEDICINE

## 2020-07-26 PROCEDURE — 80307 DRUG TEST PRSMV CHEM ANLYZR: CPT | Performed by: EMERGENCY MEDICINE

## 2020-07-26 PROCEDURE — 25000132 ZZH RX MED GY IP 250 OP 250 PS 637: Performed by: EMERGENCY MEDICINE

## 2020-07-26 PROCEDURE — 96372 THER/PROPH/DIAG INJ SC/IM: CPT | Performed by: EMERGENCY MEDICINE

## 2020-07-26 PROCEDURE — 96374 THER/PROPH/DIAG INJ IV PUSH: CPT | Performed by: EMERGENCY MEDICINE

## 2020-07-26 PROCEDURE — C9803 HOPD COVID-19 SPEC COLLECT: HCPCS | Performed by: EMERGENCY MEDICINE

## 2020-07-26 PROCEDURE — 83605 ASSAY OF LACTIC ACID: CPT | Performed by: FAMILY MEDICINE

## 2020-07-26 PROCEDURE — U0003 INFECTIOUS AGENT DETECTION BY NUCLEIC ACID (DNA OR RNA); SEVERE ACUTE RESPIRATORY SYNDROME CORONAVIRUS 2 (SARS-COV-2) (CORONAVIRUS DISEASE [COVID-19]), AMPLIFIED PROBE TECHNIQUE, MAKING USE OF HIGH THROUGHPUT TECHNOLOGIES AS DESCRIBED BY CMS-2020-01-R: HCPCS | Performed by: EMERGENCY MEDICINE

## 2020-07-26 PROCEDURE — 85025 COMPLETE CBC W/AUTO DIFF WBC: CPT | Performed by: EMERGENCY MEDICINE

## 2020-07-26 PROCEDURE — 80053 COMPREHEN METABOLIC PANEL: CPT | Performed by: EMERGENCY MEDICINE

## 2020-07-26 PROCEDURE — 80320 DRUG SCREEN QUANTALCOHOLS: CPT | Performed by: EMERGENCY MEDICINE

## 2020-07-26 PROCEDURE — 25800030 ZZH RX IP 258 OP 636: Performed by: EMERGENCY MEDICINE

## 2020-07-26 PROCEDURE — 25000128 H RX IP 250 OP 636

## 2020-07-26 PROCEDURE — 96361 HYDRATE IV INFUSION ADD-ON: CPT | Performed by: EMERGENCY MEDICINE

## 2020-07-26 PROCEDURE — 90791 PSYCH DIAGNOSTIC EVALUATION: CPT

## 2020-07-26 PROCEDURE — 99285 EMERGENCY DEPT VISIT HI MDM: CPT | Mod: 25 | Performed by: EMERGENCY MEDICINE

## 2020-07-26 PROCEDURE — 99285 EMERGENCY DEPT VISIT HI MDM: CPT | Mod: Z6 | Performed by: EMERGENCY MEDICINE

## 2020-07-26 RX ORDER — OLANZAPINE 10 MG/2ML
INJECTION, POWDER, FOR SOLUTION INTRAMUSCULAR
Status: COMPLETED
Start: 2020-07-26 | End: 2020-07-26

## 2020-07-26 RX ORDER — LORAZEPAM 2 MG/ML
1 INJECTION INTRAMUSCULAR ONCE
Status: COMPLETED | OUTPATIENT
Start: 2020-07-26 | End: 2020-07-26

## 2020-07-26 RX ORDER — GABAPENTIN 600 MG/1
600 TABLET ORAL 3 TIMES DAILY
Status: COMPLETED | OUTPATIENT
Start: 2020-07-26 | End: 2020-07-26

## 2020-07-26 RX ORDER — BUPROPION HYDROCHLORIDE 100 MG/1
100 TABLET, EXTENDED RELEASE ORAL 2 TIMES DAILY
Qty: 60 TABLET | Refills: 0 | Status: ON HOLD | OUTPATIENT
Start: 2020-07-26 | End: 2020-09-25

## 2020-07-26 RX ORDER — QUETIAPINE FUMARATE 100 MG/1
100 TABLET, FILM COATED ORAL ONCE
Status: COMPLETED | OUTPATIENT
Start: 2020-07-26 | End: 2020-07-26

## 2020-07-26 RX ORDER — GABAPENTIN 400 MG/1
400 CAPSULE ORAL 3 TIMES DAILY
Qty: 30 CAPSULE | Refills: 0 | Status: SHIPPED | OUTPATIENT
Start: 2020-07-26 | End: 2020-09-03

## 2020-07-26 RX ORDER — GABAPENTIN 100 MG/1
400 CAPSULE ORAL 3 TIMES DAILY
Qty: 30 CAPSULE | Refills: 0 | Status: SHIPPED | OUTPATIENT
Start: 2020-07-26 | End: 2020-07-26

## 2020-07-26 RX ORDER — MIRTAZAPINE 15 MG/1
15 TABLET, FILM COATED ORAL AT BEDTIME
Qty: 30 TABLET | Refills: 0 | Status: SHIPPED | OUTPATIENT
Start: 2020-07-26 | End: 2020-09-03

## 2020-07-26 RX ORDER — QUETIAPINE FUMARATE 50 MG/1
50 TABLET, FILM COATED ORAL AT BEDTIME
Qty: 30 TABLET | Refills: 0 | Status: SHIPPED | OUTPATIENT
Start: 2020-07-26 | End: 2020-09-03

## 2020-07-26 RX ORDER — ACETAMINOPHEN 325 MG/1
975 TABLET ORAL ONCE
Status: COMPLETED | OUTPATIENT
Start: 2020-07-26 | End: 2020-07-26

## 2020-07-26 RX ORDER — OLANZAPINE 10 MG/2ML
10 INJECTION, POWDER, FOR SOLUTION INTRAMUSCULAR ONCE
Status: COMPLETED | OUTPATIENT
Start: 2020-07-26 | End: 2020-07-26

## 2020-07-26 RX ADMIN — ACETAMINOPHEN 975 MG: 325 TABLET, FILM COATED ORAL at 02:09

## 2020-07-26 RX ADMIN — GABAPENTIN 600 MG: 600 TABLET, FILM COATED ORAL at 02:31

## 2020-07-26 RX ADMIN — METFORMIN HYDROCHLORIDE 1000 MG: 500 TABLET ORAL at 03:07

## 2020-07-26 RX ADMIN — QUETIAPINE FUMARATE 100 MG: 100 TABLET ORAL at 02:03

## 2020-07-26 RX ADMIN — OLANZAPINE 10 MG: 10 INJECTION, POWDER, LYOPHILIZED, FOR SOLUTION INTRAMUSCULAR at 01:49

## 2020-07-26 RX ADMIN — LORAZEPAM 1 MG: 2 INJECTION INTRAMUSCULAR; INTRAVENOUS at 03:43

## 2020-07-26 RX ADMIN — SODIUM CHLORIDE 1000 ML: 9 INJECTION, SOLUTION INTRAVENOUS at 02:08

## 2020-07-26 RX ADMIN — OLANZAPINE 10 MG: 10 INJECTION, POWDER, FOR SOLUTION INTRAMUSCULAR at 01:49

## 2020-07-26 RX ADMIN — SODIUM CHLORIDE, POTASSIUM CHLORIDE, SODIUM LACTATE AND CALCIUM CHLORIDE 1000 ML: 600; 310; 30; 20 INJECTION, SOLUTION INTRAVENOUS at 02:34

## 2020-07-26 ASSESSMENT — ENCOUNTER SYMPTOMS
FEVER: 0
DYSPHORIC MOOD: 1
AGITATION: 1
RESPIRATORY NEGATIVE: 1
ACTIVITY CHANGE: 1
GASTROINTESTINAL NEGATIVE: 1
COUGH: 0
HALLUCINATIONS: 1
CARDIOVASCULAR NEGATIVE: 1
MUSCULOSKELETAL NEGATIVE: 1
NERVOUS/ANXIOUS: 1
NEUROLOGICAL NEGATIVE: 1

## 2020-07-26 NOTE — ED NOTES
Patient was signed out to me by Dr. Aldridge.  See his note for full history and physical exam and prior emergency department course.  He presented with paranoia and has longstanding history of meth and cocaine abuse.  He apparently also has been tested for COVID as he had a temperature here in the emergency department however on my evaluation he denies any cough, known fevers, chest pain, shortness of breath, body aches, sore throat, rhinorrhea, nausea, vomiting, diarrhea, or any medical complaints.  Vital signs completely normalized here he was overall well-appearing.  The prior provider had already discussed COVID-19 quarantine precautions with the patient.  COVID did come back negative here.  Is possible he was mildly febrile related to his cocaine and meth use and their stimulant effects.  This did resolve here and he remained afebrile otherwise.  Provider had ordered a lactic acid which was previously elevated at 3.6 however after 2 L of fluid this normalized to 1.6.  This likely was related to his drug use as well.  He did not have any leukocytosis with a normal white blood cell count.  Again denies any infectious symptoms.  Laboratory studies were otherwise largely unremarkable.  Urine drug screen is positive for amphetamines and cocaine.  He had received some sedating medication overnight.  Plan was for reevaluation this morning after clinically sober.  On my reevaluation, patient appears clinically sober and is awake and alert and answering questions appropriately.  He is ambulating without difficulty.  He is eating and drinking without difficulty.  He had reported some suicidal ideation and thus he was evaluated by the behavioral health  [place].  He unfortunately has a longstanding history of paranoia related to his substance abuse and previous suicidal ideation during substance abuse and is typically discharged after sobering.  He has been admitted to multiple different places in the past.   Please see the behavioral health 's note for full details.  They did suspect the patient may be malingering for housing as he is currently homeless.  He was recently admitted at [facility] and discharged 2 days ago with offer of an ERTs but declined this and preferred to be discharged to a shelter.  He then began using meth and cocaine again.  Behavioral health  did not feel that he was acutely suicidal and that this likely represented again his known previous chronic suicidal ideation related to his substance abuse.  He had reported that he starts to feel suicidal as he feels that people are after him.  This occurs with his drug use.  Behavioral health  did not feel that he warranted inpatient psychiatric admission and recommended discharge with outpatient follow-up as scheduled with his [facility] psychiatrist on August 6.  Patient was in agreement with this plan.  Prior provider had refilled the patient's medications as he stated someone took these yesterday.  Patient was provided discharge resources and indications for return.  He voiced understanding and was comfortable with this plan.  He reported he felt safe to be discharged on my reevaluation.  He was discharged in stable condition.    MD Tessa Keen Ashley A, MD  07/26/20 2859

## 2020-07-26 NOTE — ED TRIAGE NOTES
Patient BIB police. Per officer patient felt that people were after him. Patient reports feeling suicidal and scared. Per patient his son is on death row and has murdered several people. Per patient people are out to get him because of this. Patient responding to auditory hallucinations in room. Patient hearing voices in vents. Patient febrile and tachycardic upon arrival.
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
## 2020-07-26 NOTE — PROGRESS NOTES
"Summary of care in past year: Pt has been evaluated in multiple states ([place], [place], [place], [place], [place]), and often presents with similar presentation of paranoia of people out to get him related to his son's criminal activity (murder and robbery; reports he is on death row in [place] since either 2009 or 2012) and SI to jump into traffic in the context of substance abuse; usually crack and/or meth, but he also has a history of MDMA use. Pt appears to typically clear from his substance abuse in 12 hours and usually discharges at that time. He seems to struggle with following up with outpatient care. He can be agitated and verbally threatening.    [facility]- admission: 4/6/19-4/8/19: \"The patient refused to answer questions regarding his mental health. Denied any issues. Irritable when suggested that he appear paranoid. The patient requested to be discharged as he did not feel that he needs help. At this point, the patient did not meet criteria for involuntary hospitalization, thus he was discharged to the community with prescriptions for Abilify, Gabapentin and Metformin.\"     [place], [place] ED 3/16/20: Pt presented after using meth and cannbis; notable for SI with plan to run into traffic. Self-reported history of Depression, Bipolar, Schizophrenia, PTSD; depression started after the death of his mother in 2016. He recently left [place] due to a poor relationship. Denied any prior suicide attempts. Pt was admitted to .     [place], [place] ED 4/12/20-4/13/20: self reported past psychiatric history of schizophrenia who presented to the ED on EmDet for suicidal ideation (run into traffic). Upon initial assessment, pt admitted to crack use prior to arrival. He was also noted to be irritable and unwilling to fully participate in assessment, which was initially thought to either be due to underlying psychotic disorder or malingering. While in the [facility], pt was allowed respite, restarted psychiatric medications, met with care coordination and was " "connected to resources.   Upon this reassessment, pt is organized although irritable. He does not appear psychotic or intoxicated and he does not bring up bizarre beliefs and is not observed RTIS. Pt does not appear interested in substance use or mental health treatment on an outpatient basis as he becomes very angry when discharge is brought up as he yelled, \"fine, just discharge me.\" Pt's presentation is most consistent with substance induced mood and malingering as pt is incredibly irritable and appears to be purposefully vague but goal oriented towards shelter. However, he does not appear psychotic or intoxicated and is not observed RTIS. He reports his mood as \"sleepy.\" Denies current SI/HI/AVH. \"I just do it when I do it man.\" It appears pt is purposefully being vague about any details and initially reported SI in the context of secondary gain related to shelter and substance abuse. For example, pt reports he has been in [place] for \"about four weeks\" although he initially reported he came to [place] two weeks ago. Pt also has a recent visit to [facility] (4/9/20) and was offered crisis residential services at [facility] but pt left [facility] after one day. Patient is aware of available community resources, has displayed ability to navigate community and mental health services. Patient was provided a list of community resources, encouraged to follow up appropriately with outpatient services.    [place], [place] ED; 4/21/20; 4AM (pt allowed to sober, then discharged in the afternoon): Pt is a 58 yo  male who presents to the ED on an APOWW due to SI. Per APOWW, \"He called [employer] to say he was feeling suicidal and planned on running into traffic.\" Pt presents as irritable and uncooperative upon assessment. Pt refused to open his eyes to participate in assessment. Pt had to be prompted numerous times. Pt is oriented x4. Pt reports that he called the police. Pt states, \"I wanted to hurt myself by running in front of a car.\" Pt " "reports that he wants to get some help. Pt continues to endorse SI with a plan to run in front of a car. Pt denies hx of suicide attempts. Pt denies HI/AVH. Pt denies alcohol use. Pt reports smoking Cocaine for the first time Friday and Saturday of an unknown amount. UDS positive for Cocaine. Pt reports OP tx hx. Pt reports he last saw a therapist and psychiatrist last year. Pt reports that he is prescribed psychotropic medications, however he is unable to recall the names. Pt reports that he is not med compliant. Pt states, \"I just don't take it.\" Pt reports that he has been homeless for a couple of months. Pt reports IP tx hx in [place], [place] and [place]. Pt unable to recall his last admission. Pt reports that he is dx Paranoid Schizophrenic. When asked about hx of psychosis, pt states, \"I don't know what that is.\"  explained psychosis. Pt denies hallucinations. Per nurse note, \"Patient states that his son murdered two people during an armed robbery in 2009 and is on death row. States \"there are people out to hurt me because of what my son did.\"   Pt presented sitting in bed, pt presented with poor eye contact and fidgety. Pt reports SI with a plan to step out in traffic. Pt verbally contracted for safety on the unit. SW educated pt about the importance of following up with outpatient care. Pt reports understanding     SW staffed with Dr. Damian; due to pt multiple presentations at different hospitals with same symptoms, recommended d/c with outpatient resources.     [place], [place] ED; 5/5/20 (initially presented at 3am; reassessed about 9am): [facility] Treatment Team Conclusions with Patient Involvement:   Pt is a 60 y/o AAM with a past psychiatric history of stimulant use disorder and malingering who presents to the ED on EmDet. Since April 2020, pt's visits to this ED on EmDet after flagging down a  and sharing suicidal ideations. Pt has been presenting to this ED and [facility] EDs weekly with a " "similar presentation each time. Per chart review and assistance from [facility], pt has been to crisis residential twice in April 2020. The first time at , pt left voluntarily after one day. Pt again stayed at crisis res April 9th through April 15th. However, pt admits to continued cocaine abuse and it appears pt reports chronic SI in the context of substance abuse and homelessness. He has not followed up with outpatient psychiatric care.     Upon assessment, pt is organized but somewhat irritable. He now denies SI/HI/AVH. However, he does admit that his suicidal ideation is driven by homelessness. He states, \"It's the way I'm living right now. I'm living on the streets. 5 months. Was in [place], [place]. Was living on the streets there too. I left my apartment in November. It was in [place].\" Pt also states, \"I can't get my direct express card. I get SSI. I'm just waiting for them to send my card. They said between the 1st and the 5th. It's going to my sister's house. Once I get my card, I'll find somewhere else to stay.\" Pt is clearly future oriented. This visit appears to primarily motivated by secondary gain of shelter. However, when pt was educated on emergency housing options, pt refuses each option and states he does not want to stay with his sister, does not want to stay in a shelter, and does not want a boarding home. Patient is aware of available community resources, has displayed ability to navigate community and mental health services. Patient was provided a list of community resources, encouraged to follow up appropriately with outpatient services. Patient did not appear psychotic or intoxicated and cleared from psychiatric perspective.    [place], [place] ED 5/15/20-5/16/20 (12am): Pt was allowed about 12 hours to sober and clear before discharged.  Patient is a 60 y/o AAM, with past hx of SIMD and malingering. Patient brought in by [employer] under emergency FDC for reported SI. Patient states he is here " "because he has been having suicidal thoughts, which began \"a few hours ago\". Patient states he has been thinking about ways to kill self because there are other people trying to hurt him. Patient states these thoughts do not happen often, however last about a day or two. Denies SI since arriving to [place]. Patient denies current plan or intent. Patient states finding out that he will be going back to [place] made him feel better. Patient states he has a bus ticket to leave this morning.   Per patient, he has had this bus ticket for about a week. When asked about the inconsistencies in his story, patient states \"I just needed some help to get rid of the suicidal thoughts\". Patient then states \"I'm done\" and walked out of assessment room, terminating assessment.   On arrival to [facility] Intake pt endorsed SI and HI.   Pt observed quite figity in his chair in the [facility] dayroom while talking with registration, bouncing feet and legs almost constantly.     Pt reports the onset of SI \"a few hours ago\" while thinking of a way to kill himself. Pt reports he became suicidal because \"other people trying to hurt me.\" Pt reports frequency of SI as \"not that many times\" and duration as \"day or two.\" Pt reports someone else called the police for him tonight.   Pt denies active suicidal thoughts, plan or intent to kill himself. At Intake pt reported plan to \"jump in front of a car or something.\" Pt reports SI resolved due to finding out he's going back to [place]. Pt reports he has a [employer] bus ticket to leave at 0645 this morning then reports he's had the ticket for a week. Pt is does not answer when asked why he delayed leaving [place] if he's had the bus ticket in his possession for a week. When questioned about the inconsistencies in his story pt stated \"I'm done\" and abruptly exited the consult room thus terminating the interview. H&P completed per chart review. Of note, pt displayed similar behavior during first [place] " "psychiatric encounter 20 when seen by Dr. Bradley then Dr. Newton.   Per chart review pt presentation today is similar to past encounters in which pt endorsed SI with plan to jump in front of a vehicle or train and paranoia reporting his son killed 2 men in  and now the  mens' families are out to get him; at Intake pt reported HI towards those who are following him. During prior encounters there was suspicion for secondary gain behavior in the context of homelessness and EDWIN.   Suspect recent cocaine use due to acute dysphoria. Pt's thought process was linear during brief interview, pt not observed RIS or internally preoccupied. Pt not objectively manic, psychotic, or acutely intoxicated.   On reassessment patient reports feeling better. He denies any SI/HI/AVH. He reports he has a friend in [place] that he can stay with and has been in [place] before for a 10 month stay.    [place], [place] ED; 20-20 (~12AM): Patient is a 60 year old male with a history of schizophrenia who presents to the ER for \"paranoia.\" Patient states that his son is on death row and he thins people want to hurt him to get back at his son. Patient adamantly denies wanting to hurt anyone or acting on his thoughts. Patient denies suicidal/homicidal ideations. He currently lives at the [facility]. He endores poor sleep and low energy level. He does not appear to be internally preoccupied.  Brody Colindres is a 60 y.o. male who arrived by Police presented to the emergency department for psychiatric evaluation. Patient has a history of anxiety, bipolar depression, major depressive disorder, and psychosis. He also has a history of diabetes mellitus and nerve pain. He has been somewhat transient and just moved from [place] to [place] approximately 2 weeks ago. Shortly after arriving to [place], the patient started developing suicidal ideations and was recently at [facility] for this. He left there about 4 to 5 days ago and has " "been staying at the [facility]. He states that he has been compliant with his medications but has been having increased paranoia. He is not paranoid about something specifically. He otherwise denies suicidal ideations and homicidal ideations. He denies auditory or visual hallucinations. The patient states that he would like to talk to a psychiatrist. He does have a history of suicidal ideations in the past, but has never had a suicide attempt. He takes Atarax, Seroquel, Remeron, Neurontin, and metformin. He smokes cigarettes, but denies alcohol use or drug use. Pt was positive for amphetamines.    APS at [facility] 7/8/20-7/9/20 (~9pm; discharged about 12 hours later): Presents self referred stating \"I'm feeling suicidal. Like I want to jump in front of a bus\". Pt. Reports that he used crack yesterday and today. Pt. Reports that he is homeless \"and I have nothing to live for\". At triage Pt. Is irritable when asked questions stating \"I'm fucking suicidal, that's all you need to know\". Pt reports not taking any of his medications for the past two days because of using drugs. Pt. Reports a history a history of schizophrenia, depression, polysubstance dependence and type 2 diabetic. Pt. Is calm, but irritable. Pt. Reports \"feeling paranoid and I have not slept for day days\". Pt. Denies any homicidal thoughts. MAXINE Mercer RN 7/8    Interview information:when I see him he offers nothing on spontaneous basis. He vaguely states that he is having problems with family and won't elaborate. He denies any thoughts of harm to self or others. He declines assistance with housing and wants to leave. He agrees to Seroquel although he is unable to tell me how it has been helpful.   The patient walked into [facility]PS stating that he was \"feeling suicidal\", admitting to crack use the day before, homeless and \"paranoid\". He accepted po Zyprexa and slept. He talked at length of his son being on death row in [place](of note he hospitalized at [facility] 1 " "yr ago and he did not mention this nor at his other hospital visits in Epic in March of this yr). He has well documented h/o stimulant abuse and paranoia with lack of clarity whether this has been drug induced or process such as schizophrenia. He typical talks of family members pursuing him and also making comments about I should be dead but vague and no h/o suicide attempts.   When I see him he says he arrived in [facility] a couple days ago, used crack on Monday, and that he is having trouble with his family (they all live in [place]). He is very vague about problems with his family and does not mention to me anything about his son. He says he will take the seroquel, denies being suicidal and doesn't object with the plan to DC. He declines any assistance with shelter or finding residence. In general, he is very guarded and offers very little and nothings spontaneously .    [facility] Admission following reported O.D. and medical admission for monitoring 20-20: Patient is seen for psychology consult. Goal is to de-escalate as patient is expressing demands to leave with threat of damaging unit if his demands are not met today. He states \"go ahead and call security---I have been in nursing home--I can't be in this place--my mind is made up--just fuck it if you don't let me out of here.\" He is upset about possibility of going to an IRTS--\"why would I want to go somewhere where they take all your money---I need to get out of here--I could be taking care of stuff out there (points out the window) right now.\"     Progress Toward Treatment Plan: I inform patient I am unaware of his legal status at this time, but he might be free to sign a 12 hour notice if his 72 hour hold is . I suggest he works with the discharge process versus acting on his frustrations as this might lead to unwanted delays in discharge. He has a hard time accepting this basically saying he does not care about potential consequences, but he does sit " "down and appear to calm down a little. I am concerned he might act out given his expressed attitude about \"not giving a fuck\" and \"I'll mess this place up and you will be forced to just send me somewhere\" and tells peer \"get the fuck out of my face\"---male peer (DS) is being pleasantly intrusive (and left the scene appropriately) as I am speaking with patient. Seems plausible being \"locked up\" in here is triggering dysphoric mood given his extensive long term history.     Patient will discharge to [facility] shelter today. Patient was originally open to going to an IRTS because he has not had stable housing since coming to [place] from [place]. However, patient does not want to spend his money. Patient is voluntary but become somewhat agitated on the unit and asked for discharge. Patient denies suicidal ideation and will return to [facility]. Patient was given information about psychiatric f/u at the [facility] walk-in hours clinic.     [facility] walk - hours appointment on Thursday, August 6th @ 1pm                   "

## 2020-07-26 NOTE — DISCHARGE INSTRUCTIONS
Stop using methamphetamine and cocaine.  As long as you are using methamphetamine you will continue to have paranoia and hallucinations  Restart your medications that were prescribed at [facility], will provide a 1 month supply  You had a fever and I am concerned that you could have COVID-19.  You should wear a mask and isolate yourself until the test results come back usually in 1 to 2 days.  Get a Rule 25 evaluation and start a chemical dependency treatment program  Follow-up in your clinic to have your medications refilled

## 2020-07-26 NOTE — ED NOTES
I have performed an in person assessment of the patient. Based on this assessment the patient no longer requires a one on one attendant at this point in time.    Orestes Aldridge MD  3:29 AM  July 26, 2020           Orestes Aldridge MD  07/26/20 0329

## 2020-07-26 NOTE — ED NOTES
"Pt paranoid about vents in room, does not want to stay in room, security at door trying to redirect pt.  Pt keeps stating that something is going to harm him. He hears someone in the vents.  Pt pushing his way out of the room.  MD notified.  \"I'm afraid for my life\" pt states repeatedly.  "

## 2020-07-26 NOTE — ED AVS SNAPSHOT
[facility], [place], Emergency Department  [address]  [facility] [address]  Phone:  [phone]  Fax:  [phone]                                    Brody Colindres   MRN: [id number]    Department:  [facility], Emergency Department   Date of Visit:  7/26/2020           After Visit Summary Signature Page    I have received my discharge instructions, and my questions have been answered. I have discussed any challenges I see with this plan with the nurse or doctor.    ..........................................................................................................................................  Patient/Patient Representative Signature      ..........................................................................................................................................  Patient Representative Print Name and Relationship to Patient    ..................................................               ................................................  Date                                   Time    ..........................................................................................................................................  Reviewed by Signature/Title    ...................................................              ..............................................  Date                                               Time          22EPIC Rev 08/18

## 2020-07-27 ENCOUNTER — AMBULATORY - HEALTHEAST (OUTPATIENT)
Dept: BEHAVIORAL HEALTH | Facility: CLINIC | Age: 60
End: 2020-07-27

## 2020-07-31 ENCOUNTER — COMMUNICATION - HEALTHEAST (OUTPATIENT)
Dept: SCHEDULING | Facility: CLINIC | Age: 60
End: 2020-07-31

## 2020-08-08 ENCOUNTER — HOSPITAL ENCOUNTER (EMERGENCY)
Facility: CLINIC | Age: 60
Discharge: HOME | End: 2020-08-09
Attending: EMERGENCY MEDICINE | Admitting: EMERGENCY MEDICINE
Payer: MEDICAID

## 2020-08-08 DIAGNOSIS — F19.10 SUBSTANCE ABUSE (H): ICD-10-CM

## 2020-08-08 LAB — GLUCOSE BLDC GLUCOMTR-MCNC: 186 MG/DL (ref 70–99)

## 2020-08-08 PROCEDURE — 99283 EMERGENCY DEPT VISIT LOW MDM: CPT | Mod: Z6 | Performed by: EMERGENCY MEDICINE

## 2020-08-08 PROCEDURE — 80307 DRUG TEST PRSMV CHEM ANLYZR: CPT | Performed by: FAMILY MEDICINE

## 2020-08-08 PROCEDURE — 80320 DRUG SCREEN QUANTALCOHOLS: CPT | Performed by: FAMILY MEDICINE

## 2020-08-08 PROCEDURE — 99285 EMERGENCY DEPT VISIT HI MDM: CPT | Performed by: EMERGENCY MEDICINE

## 2020-08-08 PROCEDURE — 00000146 ZZHCL STATISTIC GLUCOSE BY METER IP

## 2020-08-08 NOTE — ED AVS SNAPSHOT
[facility], [place], Emergency Department  [address]  [facility] [address]  Phone:  [phone]  Fax:  [phone]                                    Brody Colindres   MRN: [id number]    Department:  [facility], Emergency Department   Date of Visit:  8/8/2020           After Visit Summary Signature Page    I have received my discharge instructions, and my questions have been answered. I have discussed any challenges I see with this plan with the nurse or doctor.    ..........................................................................................................................................  Patient/Patient Representative Signature      ..........................................................................................................................................  Patient Representative Print Name and Relationship to Patient    ..................................................               ................................................  Date                                   Time    ..........................................................................................................................................  Reviewed by Signature/Title    ...................................................              ..............................................  Date                                               Time          22EPIC Rev 08/18

## 2020-08-09 VITALS
OXYGEN SATURATION: 100 % | SYSTOLIC BLOOD PRESSURE: 134 MMHG | RESPIRATION RATE: 18 BRPM | DIASTOLIC BLOOD PRESSURE: 89 MMHG | HEART RATE: 99 BPM | TEMPERATURE: 98.4 F

## 2020-08-09 LAB
AMPHETAMINES UR QL SCN: POSITIVE
BARBITURATES UR QL: NEGATIVE
BENZODIAZ UR QL: NEGATIVE
CANNABINOIDS UR QL SCN: NEGATIVE
COCAINE UR QL: POSITIVE
ETHANOL UR QL SCN: NEGATIVE
OPIATES UR QL SCN: NEGATIVE

## 2020-08-09 ASSESSMENT — ENCOUNTER SYMPTOMS
ARTHRALGIAS: 0
HEADACHES: 0
SHORTNESS OF BREATH: 0
NERVOUS/ANXIOUS: 1
NAUSEA: 0
ABDOMINAL PAIN: 0
COLOR CHANGE: 0
VOMITING: 0
NECK STIFFNESS: 0
CONFUSION: 0
DIFFICULTY URINATING: 0
FEVER: 0
EYE REDNESS: 0

## 2020-08-09 NOTE — ED PROVIDER NOTES
"ED Provider Note  [facility]      History     Chief Complaint   Patient presents with     Suicidal     Pt is suicidal with plan to jump infront of train     HPI  Brody Colindres is a 60 year old male with a history of schizoaffective disorder, diabetes, and substance abuse who presents to the emergency department for a \"panic attack\" in his words.  The patient was sleeping when I entered the room.  The triage note indicates that he was picked up from a bar and was complaining of suicidal ideation.  He is denying any suicidal ideations to me.  Patient states he has not slept in the past 4 days and would like to get some rest.  Denies any paranoia or hallucinations.  He denies any recent illness or medical concerns including fever, cough, chest pain, or dyspnea.  Patient denies abdominal pain.  No nausea, vomiting, or diarrhea.  He states that he would like to sleep for a while and then be discharged from the emergency department.    Past Medical History  Past Medical History:   Diagnosis Date     Diabetes (H)      History reviewed. No pertinent surgical history.  ARIPiprazole (ABILIFY) 10 MG tablet  buPROPion (WELLBUTRIN SR) 100 MG 12 hr tablet  gabapentin (NEURONTIN) 400 MG capsule  gabapentin (NEURONTIN) 600 MG tablet  metFORMIN (GLUCOPHAGE) 1000 MG tablet  metFORMIN (GLUCOPHAGE) 1000 MG tablet  mirtazapine (REMERON) 15 MG tablet  QUEtiapine (SEROQUEL) 50 MG tablet      No Known Allergies  Past medical history, past surgical history, medications, and allergies were reviewed with the patient. Additional pertinent items:     Family History  No family history on file.  Family history was reviewed with the patient. Additional pertinent items:     Social History  Social History     Tobacco Use     Smoking status: Heavy Tobacco Smoker     Packs/day: 0.50     Smokeless tobacco: Never Used   Substance Use Topics     Alcohol use: No     Frequency: Never     Drug use: Yes     Types: Cocaine     " Comment: used 4/4      Social history was reviewed with the patient. Additional pertinent items:     Review of Systems   Constitutional: Negative for fever.   HENT: Negative for congestion.    Eyes: Negative for redness.   Respiratory: Negative for shortness of breath.    Cardiovascular: Negative for chest pain.   Gastrointestinal: Negative for abdominal pain, nausea and vomiting.   Genitourinary: Negative for difficulty urinating.   Musculoskeletal: Negative for arthralgias and neck stiffness.   Skin: Negative for color change.   Neurological: Negative for headaches.   Psychiatric/Behavioral: Negative for confusion and suicidal ideas. The patient is nervous/anxious.    All other systems reviewed and are negative.    A complete review of systems was performed with pertinent positives and negatives noted in the HPI, and all other systems negative.    Physical Exam   BP: 131/78  Pulse: 113  Temp: 98.4  F (36.9  C)  Resp: 16  SpO2: 98 %  Physical Exam  Vitals signs and nursing note reviewed.   Constitutional:       General: He is not in acute distress.     Appearance: Normal appearance. He is not diaphoretic.   HENT:      Head: Normocephalic and atraumatic.   Eyes:      General: No scleral icterus.     Pupils: Pupils are equal, round, and reactive to light.   Cardiovascular:      Rate and Rhythm: Normal rate and regular rhythm.      Pulses: Normal pulses.   Pulmonary:      Effort: Pulmonary effort is normal. No respiratory distress.   Abdominal:      Palpations: Abdomen is soft.      Tenderness: There is no abdominal tenderness.   Musculoskeletal:         General: No tenderness.   Skin:     General: Skin is warm.      Findings: No rash.   Neurological:      General: No focal deficit present.      Mental Status: He is alert.      Motor: No weakness.      Coordination: Coordination normal.   Psychiatric:         Speech: Speech normal.         Behavior: Behavior is cooperative.         Thought Content: Thought content is  "not paranoid. Thought content does not include suicidal ideation.         ED Course      Procedures             Results for orders placed or performed during the hospital encounter of 08/08/20   Glucose by meter     Status: Abnormal   Result Value Ref Range    Glucose 186 (H) 70 - 99 mg/dL   Drug abuse screen 6 urine (tox)     Status: Abnormal   Result Value Ref Range    Amphetamine Qual Urine Positive (A) NEG^Negative    Barbiturates Qual Urine Negative NEG^Negative    Benzodiazepine Qual Urine Negative NEG^Negative    Cannabinoids Qual Urine Negative NEG^Negative    Cocaine Qual Urine Positive (A) NEG^Negative    Ethanol Qual Urine Negative NEG^Negative    Opiates Qualitative Urine Negative NEG^Negative     Medications - No data to display     Assessments & Plan (with Medical Decision Making)   60 year old male with history of polysubstance abuse, schizoaffective disorder, and diabetes to the emergency department for a \"panic attack.\"  Patient is denying that ideations here in the emergency department.  He states he has not slept in 4 days and would like to rest in the emergency.  He was allowed to sleep overnight.  He will be discharged home.  He should follow-up with his outpatient providers.  He denies any medical concerns.  He appears clinically well and has a normal physical exam.    I have reviewed the nursing notes. I have reviewed the findings, diagnosis, plan and need for follow up with the patient.    New Prescriptions    No medications on file       Final diagnoses:   Substance abuse (H)       --  LOKESH MILLARD MD, MD   Emergency Medicine   [facility], EMERGENCY DEPARTMENT  8/8/2020     Lokesh Millard MD  08/09/20 0603    "

## 2020-08-09 NOTE — ED TRIAGE NOTES
Pt was picked up from bar reported feeling suicidal with plan to jump infront of train. Pt reported that his mom  recently and his son was sent to death row. Pt is diabetic BG was 221.

## 2020-08-09 NOTE — DISCHARGE INSTRUCTIONS
Take your medications as directed.  Avoid drug use.  Follow-up for an outpatient chemical dependency assessment evaluation. (See accompanying sheet).

## 2020-08-10 ENCOUNTER — HOSPITAL ENCOUNTER (EMERGENCY)
Facility: CLINIC | Age: 60
Discharge: HOME | End: 2020-08-10
Attending: EMERGENCY MEDICINE | Admitting: EMERGENCY MEDICINE
Payer: MEDICAID

## 2020-08-10 VITALS
OXYGEN SATURATION: 100 % | BODY MASS INDEX: 30.48 KG/M2 | WEIGHT: 231 LBS | RESPIRATION RATE: 18 BRPM | TEMPERATURE: 96.5 F | SYSTOLIC BLOOD PRESSURE: 150 MMHG | HEART RATE: 78 BPM | DIASTOLIC BLOOD PRESSURE: 81 MMHG

## 2020-08-10 DIAGNOSIS — R45.851 SUICIDAL IDEATION: ICD-10-CM

## 2020-08-10 LAB
AMPHETAMINES UR QL SCN: POSITIVE
BARBITURATES UR QL: NEGATIVE
BENZODIAZ UR QL: NEGATIVE
CANNABINOIDS UR QL SCN: NEGATIVE
COCAINE UR QL: POSITIVE
ETHANOL UR QL SCN: NEGATIVE
GLUCOSE BLDC GLUCOMTR-MCNC: 166 MG/DL (ref 70–99)
OPIATES UR QL SCN: NEGATIVE

## 2020-08-10 PROCEDURE — 80320 DRUG SCREEN QUANTALCOHOLS: CPT | Performed by: FAMILY MEDICINE

## 2020-08-10 PROCEDURE — 99285 EMERGENCY DEPT VISIT HI MDM: CPT | Performed by: EMERGENCY MEDICINE

## 2020-08-10 PROCEDURE — 80307 DRUG TEST PRSMV CHEM ANLYZR: CPT | Performed by: FAMILY MEDICINE

## 2020-08-10 PROCEDURE — 00000146 ZZHCL STATISTIC GLUCOSE BY METER IP

## 2020-08-10 PROCEDURE — 99283 EMERGENCY DEPT VISIT LOW MDM: CPT | Mod: Z6 | Performed by: EMERGENCY MEDICINE

## 2020-08-10 RX ORDER — BUPROPION HYDROCHLORIDE 150 MG/1
150 TABLET ORAL DAILY
Status: ON HOLD | COMMUNITY
Start: 2020-08-04 | End: 2020-09-25

## 2020-08-10 RX ORDER — GABAPENTIN 400 MG/1
400 CAPSULE ORAL 3 TIMES DAILY
Status: ON HOLD | COMMUNITY
Start: 2020-07-23 | End: 2020-09-30

## 2020-08-10 NOTE — DISCHARGE INSTRUCTIONS
Please make an appointment to follow up with Your Primary Care Provider as soon as possible.    Return to the emergency department if you feel like you are harming self or others or if you have any further concerns.

## 2020-08-10 NOTE — ED AVS SNAPSHOT
[facility], [place], Emergency Department  [address]  [facility] [address]  Phone:  [phone]  Fax:  [phone]                                    Brody Colindres   MRN: [id number]    Department:  [facility], Emergency Department   Date of Visit:  8/10/2020           After Visit Summary Signature Page    I have received my discharge instructions, and my questions have been answered. I have discussed any challenges I see with this plan with the nurse or doctor.    ..........................................................................................................................................  Patient/Patient Representative Signature      ..........................................................................................................................................  Patient Representative Print Name and Relationship to Patient    ..................................................               ................................................  Date                                   Time    ..........................................................................................................................................  Reviewed by Signature/Title    ...................................................              ..............................................  Date                                               Time          22EPIC Rev 08/18

## 2020-08-10 NOTE — ED PROVIDER NOTES
"ED Provider Note  [facility]      History     Chief Complaint   Patient presents with     Suicidal     \"I'm feeling suicidal. I just want to get it over with.\"  Plan to run into rail section.     HPI  Brody Colindres is a 60 year old male who history of diabetes, schizoaffective disorder, substance abuse presenting with suicidal ideation with plan to jump from a train.  He recently was here with similar thoughts.  He told this to our nurse and also to myself.  The patient says is been taking his medications.  Denies drug overdose.  He used amphetamines yesterday.  Denies any recent drug or alcohol use in the past 12 hours.  Denies any physical discomfort.  Denies hallucinations or voices.  The patient says is been stressed out because people are trying to kill him, he does not know who.  He has had issues with his son.  Patient denies having a gun.    Past Medical History  Past Medical History:   Diagnosis Date     Diabetes (H)      History reviewed. No pertinent surgical history.  ARIPiprazole (ABILIFY) 10 MG tablet  buPROPion (WELLBUTRIN SR) 100 MG 12 hr tablet  gabapentin (NEURONTIN) 400 MG capsule  gabapentin (NEURONTIN) 600 MG tablet  metFORMIN (GLUCOPHAGE) 1000 MG tablet  metFORMIN (GLUCOPHAGE) 1000 MG tablet  mirtazapine (REMERON) 15 MG tablet  QUEtiapine (SEROQUEL) 50 MG tablet      No Known Allergies  Past medical history, past surgical history, medications, and allergies were reviewed with the patient. Additional pertinent items: None    Family History  History reviewed. No pertinent family history.  Family history was reviewed with the patient. Additional pertinent items: None    Social History  Social History     Tobacco Use     Smoking status: Heavy Tobacco Smoker     Packs/day: 0.50     Smokeless tobacco: Never Used   Substance Use Topics     Alcohol use: No     Frequency: Never     Drug use: Yes     Types: Cocaine     Comment: used 4/4      Social history was reviewed " with the patient. Additional pertinent items: None    Review of Systems  A complete review of systems was performed with pertinent positives and negatives noted in the HPI, and all other systems negative.    Physical Exam   BP: (!) 182/106  Pulse: 99  Temp: 96.8  F (36  C)  Resp: 16  Weight: 104.8 kg (231 lb)  SpO2: 100 %  Physical Exam  Physical Exam   Constitutional: oriented to person, place, and time. appears well-developed and well-nourished.   HENT:   Head: Normocephalic and atraumatic.   Neck: Normal range of motion.   Pulmonary/Chest: Effort normal. No respiratory distress.   Cardiac: No murmurs, rubs, gallops. RRR.  Abdominal: Abdomen soft, nontender, nondistended. No rebound tenderness.  MSK: Long bones without deformity or evidence of trauma  Neurological: alert and oriented to person, place, and time.   Skin: Skin is warm and dry.   Psychiatric:  normal mood and affect.  behavior is normal. Thought content normal.     ED Course      Procedures             Results for orders placed or performed during the hospital encounter of 08/10/20   Drug abuse screen 6 urine (chem dep)     Status: Abnormal   Result Value Ref Range    Amphetamine Qual Urine Positive (A) NEG^Negative    Barbiturates Qual Urine Negative NEG^Negative    Benzodiazepine Qual Urine Negative NEG^Negative    Cannabinoids Qual Urine Negative NEG^Negative    Cocaine Qual Urine Positive (A) NEG^Negative    Ethanol Qual Urine Negative NEG^Negative    Opiates Qualitative Urine Negative NEG^Negative     Medications - No data to display     Assessments & Plan (with Medical Decision Making)   MDM  Patient presented with suicidal ideation.  Recently had a visit for a panic attack.  Did not see a mental health  at that point, I feel that he should be seen by mental health  due to suicidal ideation with plan.  Patient will be observed until able to have an assessment done.    Addendum: Reevaluation this morning, the patient said that he  felt safe going home.  He does not know if he has follow-up however he has been seen multiple times in multiple emergency departments.  His primary care provider.  Recommended him following up with his regular doctors and psychiatric team.  Patient be discharged, he does feel comfortable with this.  Discussed cessation of substances.    I have reviewed the nursing notes. I have reviewed the findings, diagnosis, plan and need for follow up with the patient.    New Prescriptions    No medications on file       Final diagnoses:   Suicidal ideation       --  Samuel Ramachandran MD   Emergency Medicine   [facility], EMERGENCY DEPARTMENT  8/10/2020     Samuel Ramachandran MD  08/10/20 0458       Samuel Ramachandran MD  08/10/20 0737

## 2020-09-02 ENCOUNTER — HOSPITAL ENCOUNTER (EMERGENCY)
Facility: CLINIC | Age: 60
Discharge: OTHER INSTITUTION | End: 2020-09-03
Attending: EMERGENCY MEDICINE | Admitting: EMERGENCY MEDICINE
Payer: MEDICAID

## 2020-09-02 DIAGNOSIS — F32.A DEPRESSION, UNSPECIFIED DEPRESSION TYPE: ICD-10-CM

## 2020-09-02 DIAGNOSIS — Z59.00 HOMELESSNESS: ICD-10-CM

## 2020-09-02 PROCEDURE — 99285 EMERGENCY DEPT VISIT HI MDM: CPT | Mod: 25

## 2020-09-02 PROCEDURE — 90791 PSYCH DIAGNOSTIC EVALUATION: CPT

## 2020-09-02 SDOH — ECONOMIC STABILITY - HOUSING INSECURITY: HOMELESSNESS UNSPECIFIED: Z59.00

## 2020-09-02 ASSESSMENT — MIFFLIN-ST. JEOR: SCORE: 1957.98

## 2020-09-02 NOTE — ED AVS SNAPSHOT
Emergency Department  [address] [place] [address]  Phone:  [phone]  Fax:  [phone]                                    Brody Colindres   MRN: [id number]    Department:   Emergency Department   Date of Visit:  9/2/2020           After Visit Summary Signature Page    I have received my discharge instructions, and my questions have been answered. I have discussed any challenges I see with this plan with the nurse or doctor.    ..........................................................................................................................................  Patient/Patient Representative Signature      ..........................................................................................................................................  Patient Representative Print Name and Relationship to Patient    ..................................................               ................................................  Date                                   Time    ..........................................................................................................................................  Reviewed by Signature/Title    ...................................................              ..............................................  Date                                               Time          22EPIC Rev 08/18

## 2020-09-03 VITALS
DIASTOLIC BLOOD PRESSURE: 86 MMHG | WEIGHT: 239 LBS | BODY MASS INDEX: 31.68 KG/M2 | TEMPERATURE: 98.8 F | RESPIRATION RATE: 16 BRPM | OXYGEN SATURATION: 99 % | HEIGHT: 73 IN | HEART RATE: 64 BPM | SYSTOLIC BLOOD PRESSURE: 132 MMHG

## 2020-09-03 LAB
AMPHETAMINES UR QL SCN: NEGATIVE
BARBITURATES UR QL: NEGATIVE
BENZODIAZ UR QL: NEGATIVE
CANNABINOIDS UR QL SCN: NEGATIVE
COCAINE UR QL: POSITIVE
OPIATES UR QL SCN: NEGATIVE
PCP UR QL SCN: NEGATIVE

## 2020-09-03 PROCEDURE — 80307 DRUG TEST PRSMV CHEM ANLYZR: CPT | Performed by: EMERGENCY MEDICINE

## 2020-09-03 RX ORDER — BUPROPION HYDROCHLORIDE 150 MG/1
150 TABLET, EXTENDED RELEASE ORAL 2 TIMES DAILY
Status: DISCONTINUED | OUTPATIENT
Start: 2020-09-03 | End: 2020-09-03 | Stop reason: HOSPADM

## 2020-09-03 RX ORDER — PREGABALIN 25 MG/1
25 CAPSULE ORAL 3 TIMES DAILY
Status: DISCONTINUED | OUTPATIENT
Start: 2020-09-03 | End: 2020-09-03

## 2020-09-03 RX ORDER — PREGABALIN 25 MG/1
25 CAPSULE ORAL 3 TIMES DAILY
Status: ON HOLD | COMMUNITY
End: 2020-09-25

## 2020-09-03 RX ORDER — BUPROPION HYDROCHLORIDE 150 MG/1
150 TABLET ORAL DAILY
Status: DISCONTINUED | OUTPATIENT
Start: 2020-09-03 | End: 2020-09-03

## 2020-09-03 RX ORDER — BUPROPION HYDROCHLORIDE 150 MG/1
150 TABLET, EXTENDED RELEASE ORAL 2 TIMES DAILY
Qty: 6 TABLET | Refills: 0 | Status: ON HOLD | OUTPATIENT
Start: 2020-09-03 | End: 2020-09-25

## 2020-09-03 ASSESSMENT — ENCOUNTER SYMPTOMS: DYSPHORIC MOOD: 1

## 2020-09-03 NOTE — ED TRIAGE NOTES
Pt presents to ED via EMS.  Pt called EMS due to having suicidal ideation with plan to run into traffic.  Pt has son on death ro at this time, fears that that people who are involved with his son are after him.  Hx of schizophrenia, bipolar, depression.  Pt fears going to see family due to fear of people who are following them, will get them.  Pt hoping for placement in facility for ongoing care.  Pt currently on haldol and seroquel for home meds.  Pt reports drinking ETOH, and smoking crack cocaine in last 24hrs.   for EMS.

## 2020-09-03 NOTE — ED PROVIDER NOTES
"  History     Chief Complaint:  Suicidal and Paranoid    HPI   Brody Sheldon Colindres is a 60 year old male with significant mental health history of anxiety, depression, schizophrenia and polysubstance abuse who presents suicidal and paranoid. The patient called EMS himself after having suicidal ideation with plan to run into traffic. The patient reports multiple life events including a son who is currently on death row and other family deaths. He fears going to visit family due to a fear of people who are following them. The patient expresses desire for placecement in a facility for ongoing care. The patient denies recently drinking or drug use.    Per chart review he does have several visits where he presented to the ER and was diagnosed with malingering discharge, but also has some where he was diagnosis with suicidal ideation and admitted.    Allergies:  No Known Drug Allergies    Medications:    Abilify   Wellbutrin   Neurontin   Metformin   Remeron   Seroquel   Vistaril   Riserdal   Cymbalta      Past Medical History:    Anxiety   Depression   Diabetes   Schizophrenia   Suicidal ideation  Polysubstance abuse- amphetamine, cocaine  Hypertension  Bipolar 2 disorder  Neuropathy   Intentional overdose   PTSD   Paranoia    Past Surgical History:    The patient does not have any pertinent past surgical history.    Family History:    No past pertinent family history.    Social History:  The patient was accompanied to the ED by EMS.  Smoking Status: Heavy Tobacco Smoker  Smokeless Tobacco: Never Used  Alcohol Use: Positive  Drug Use: Positive, cocaine   Marital Status:  Single    Patient is homeless, 2020    Review of Systems   Psychiatric/Behavioral: Positive for dysphoric mood and suicidal ideas.   All other systems reviewed and are negative.        Physical Exam     Patient Vitals for the past 24 hrs:   BP Temp Temp src Pulse Resp SpO2 Height Weight   09/02/20 2051 -- 98.8  F (37.1  C) Oral 96 15 99 % 1.854 m (6' 1\") " "108.4 kg (239 lb)   09/02/20 2050 (!) 134/92 -- -- 101 -- 98 % -- --       Physical Exam  Vitals: reviewed by me  General: Pt seen on hospital [facility], pleasant, cooperative, and alert to conversation  Eyes: Tracking well, clear conjunctiva BL  ENT: MMM, midline trachea.   Lungs:  No tachypnea, no accessory muscle use. No respiratory distress.   CV: Rate as above, regular rhythm.    MSK: no peripheral edema or joint effusion.  No evidence of trauma  Skin: No rash, normal turgor and temperature  Neuro: Clear speech and no facial droop.  Psych: Not RIS, no e/o AH/VH, does endorse suicidality.      Emergency Department Course     Emergency Department Course:     Nursing notes and vitals reviewed.    2056 I performed an exam of the patient as documented above.    2121 The patient to be evaluated by DEC.    2254 I spoke with Lynda from DEC regarding patient's presentation, findings, and plan of care.    2357 Findings and plan explained to the Patient. Patient discharged home with instructions regarding supportive care, medications, and reasons to return. The importance of close follow-up was reviewed.    Impression & Plan      Medical Decision Making:  Brody Colindres is a 60 year old male who presents to the emergency room with suicidality. His recurrent issues with suicidality, stem from a number of terrible life events, starting sometime around 2012 when his son was imprisoned for murder and reportedly placed on death row. He also has lost several other family members and is currently homeless. He denies currently being intoxicated or using recently, but he does also have a polysubstance abuse in his history. He states that he would go home and \"try and get high\" if he did go home. And I do think that he certainly merits a formal DEC evaluation.    Late entry:  At time of my shift ending, he had been seen by a mental health , and it appears as though his goals are simply to get shelter, not necessarily in " the mental health area.  He states he asked he does not feel suicidal, and would prefer to go to detox since he can stay there for 3 days, instead of possibly being discharged after 1 day on mental health floor.  We found a bed for him at [facility], and he feels comfortable going there, and appears to appreciate this plan.  Will sign out to Dr. Orellana, with plan for ultimate disposition to [facility] detox.    Diagnosis:    ICD-10-CM   1. Depression, unspecified depression type  F32.9   2. Homelessness  Z59.0     Disposition:   Patient is discharged home.     Scribe Disclosure:  I, Esme Raisa, am serving as a scribe at 9:13 PM on 9/2/2020 to document services personally performed by Dominick Cruz MD based on my observations and the provider's statements to me.   EMERGENCY DEPARTMENT       Dominick Cruz MD  09/03/20 1600

## 2020-09-03 NOTE — ED NOTES
Pt agreeable to go to detox at this time.  [facility]  Paperwork faxed over to [facility].  Pt sleeping in the bed at this time.  Will continue to monitor.

## 2020-09-03 NOTE — ED NOTES
Bed placement found at [facility] for patient.  Patient agreeable to discharge to detox.  3day supply of medication sent with patient.

## 2020-09-03 NOTE — ED NOTES
Bed: ED19  Expected date: 9/2/20  Expected time: 8:39 PM  Means of arrival: Ambulance  Comments:  HEMS 429 60M suicidal

## 2020-09-25 ENCOUNTER — HOSPITAL ENCOUNTER (INPATIENT)
Facility: CLINIC | Age: 60
LOS: 6 days | Discharge: PSYCHIATRIC OR SUBSTANCE USE FACILITY | End: 2020-10-01
Attending: EMERGENCY MEDICINE | Admitting: PSYCHIATRY & NEUROLOGY
Payer: MEDICAID

## 2020-09-25 ENCOUNTER — TELEPHONE (OUTPATIENT)
Dept: BEHAVIORAL HEALTH | Facility: CLINIC | Age: 60
End: 2020-09-25

## 2020-09-25 DIAGNOSIS — Z79.4 INSULIN DEPENDENT TYPE 2 DIABETES MELLITUS (H): Primary | ICD-10-CM

## 2020-09-25 DIAGNOSIS — Z20.828 EXPOSURE TO SARS-ASSOCIATED CORONAVIRUS: ICD-10-CM

## 2020-09-25 DIAGNOSIS — R45.851 SUICIDAL THOUGHTS: ICD-10-CM

## 2020-09-25 DIAGNOSIS — E11.9 INSULIN DEPENDENT TYPE 2 DIABETES MELLITUS (H): Primary | ICD-10-CM

## 2020-09-25 DIAGNOSIS — F33.9 RECURRENT MAJOR DEPRESSIVE DISORDER, REMISSION STATUS UNSPECIFIED (H): ICD-10-CM

## 2020-09-25 LAB
ALCOHOL BREATH TEST: 0 (ref 0–0.01)
AMPHETAMINES UR QL SCN: POSITIVE
BARBITURATES UR QL: NEGATIVE
BENZODIAZ UR QL: NEGATIVE
CANNABINOIDS UR QL SCN: NEGATIVE
COCAINE UR QL: POSITIVE
ETHANOL UR QL SCN: NEGATIVE
GLUCOSE BLDC GLUCOMTR-MCNC: 255 MG/DL (ref 70–99)
OPIATES UR QL SCN: NEGATIVE
SARS-COV-2 RNA SPEC QL NAA+PROBE: NORMAL
SPECIMEN SOURCE: NORMAL

## 2020-09-25 PROCEDURE — 25000132 ZZH RX MED GY IP 250 OP 250 PS 637: Performed by: EMERGENCY MEDICINE

## 2020-09-25 PROCEDURE — 82075 ASSAY OF BREATH ETHANOL: CPT | Performed by: EMERGENCY MEDICINE

## 2020-09-25 PROCEDURE — C9803 HOPD COVID-19 SPEC COLLECT: HCPCS | Performed by: EMERGENCY MEDICINE

## 2020-09-25 PROCEDURE — 25000132 ZZH RX MED GY IP 250 OP 250 PS 637: Performed by: PSYCHIATRY & NEUROLOGY

## 2020-09-25 PROCEDURE — 80320 DRUG SCREEN QUANTALCOHOLS: CPT | Performed by: EMERGENCY MEDICINE

## 2020-09-25 PROCEDURE — 99285 EMERGENCY DEPT VISIT HI MDM: CPT | Mod: 25 | Performed by: EMERGENCY MEDICINE

## 2020-09-25 PROCEDURE — 90791 PSYCH DIAGNOSTIC EVALUATION: CPT

## 2020-09-25 PROCEDURE — U0003 INFECTIOUS AGENT DETECTION BY NUCLEIC ACID (DNA OR RNA); SEVERE ACUTE RESPIRATORY SYNDROME CORONAVIRUS 2 (SARS-COV-2) (CORONAVIRUS DISEASE [COVID-19]), AMPLIFIED PROBE TECHNIQUE, MAKING USE OF HIGH THROUGHPUT TECHNOLOGIES AS DESCRIBED BY CMS-2020-01-R: HCPCS | Performed by: EMERGENCY MEDICINE

## 2020-09-25 PROCEDURE — 00000146 ZZHCL STATISTIC GLUCOSE BY METER IP

## 2020-09-25 PROCEDURE — 80307 DRUG TEST PRSMV CHEM ANLYZR: CPT | Performed by: EMERGENCY MEDICINE

## 2020-09-25 PROCEDURE — 12400001 ZZH R&B MH UMMC

## 2020-09-25 PROCEDURE — 99285 EMERGENCY DEPT VISIT HI MDM: CPT | Mod: Z6 | Performed by: EMERGENCY MEDICINE

## 2020-09-25 RX ORDER — BUPROPION HYDROCHLORIDE 100 MG/1
100 TABLET, EXTENDED RELEASE ORAL 2 TIMES DAILY
Status: DISCONTINUED | OUTPATIENT
Start: 2020-09-25 | End: 2020-10-01 | Stop reason: HOSPADM

## 2020-09-25 RX ORDER — OLANZAPINE 10 MG/2ML
10 INJECTION, POWDER, FOR SOLUTION INTRAMUSCULAR
Status: DISCONTINUED | OUTPATIENT
Start: 2020-09-25 | End: 2020-10-01 | Stop reason: HOSPADM

## 2020-09-25 RX ORDER — QUETIAPINE FUMARATE 100 MG/1
100 TABLET, FILM COATED ORAL ONCE
Status: COMPLETED | OUTPATIENT
Start: 2020-09-25 | End: 2020-09-25

## 2020-09-25 RX ORDER — BISACODYL 10 MG
10 SUPPOSITORY, RECTAL RECTAL DAILY PRN
Status: DISCONTINUED | OUTPATIENT
Start: 2020-09-25 | End: 2020-10-01 | Stop reason: HOSPADM

## 2020-09-25 RX ORDER — ACETAMINOPHEN 325 MG/1
650 TABLET ORAL EVERY 4 HOURS PRN
Status: DISCONTINUED | OUTPATIENT
Start: 2020-09-25 | End: 2020-10-01 | Stop reason: HOSPADM

## 2020-09-25 RX ORDER — QUETIAPINE FUMARATE 50 MG/1
50 TABLET, FILM COATED ORAL
Status: DISCONTINUED | OUTPATIENT
Start: 2020-09-25 | End: 2020-10-01 | Stop reason: HOSPADM

## 2020-09-25 RX ORDER — GABAPENTIN 400 MG/1
400 CAPSULE ORAL 3 TIMES DAILY
Status: DISCONTINUED | OUTPATIENT
Start: 2020-09-25 | End: 2020-10-01 | Stop reason: HOSPADM

## 2020-09-25 RX ORDER — TRAZODONE HYDROCHLORIDE 50 MG/1
50 TABLET, FILM COATED ORAL
Status: DISCONTINUED | OUTPATIENT
Start: 2020-09-25 | End: 2020-10-01 | Stop reason: HOSPADM

## 2020-09-25 RX ORDER — QUETIAPINE FUMARATE 50 MG/1
50 TABLET, FILM COATED ORAL
Status: ON HOLD | COMMUNITY
End: 2020-09-30

## 2020-09-25 RX ORDER — MIRTAZAPINE 15 MG/1
15 TABLET, FILM COATED ORAL AT BEDTIME
Status: ON HOLD | COMMUNITY
End: 2020-09-30

## 2020-09-25 RX ORDER — NICOTINE 21 MG/24HR
1 PATCH, TRANSDERMAL 24 HOURS TRANSDERMAL DAILY
Status: DISCONTINUED | OUTPATIENT
Start: 2020-09-25 | End: 2020-09-27

## 2020-09-25 RX ORDER — HYDROXYZINE HYDROCHLORIDE 25 MG/1
25 TABLET, FILM COATED ORAL EVERY 4 HOURS PRN
Status: DISCONTINUED | OUTPATIENT
Start: 2020-09-25 | End: 2020-10-01 | Stop reason: HOSPADM

## 2020-09-25 RX ORDER — ALUMINA, MAGNESIA, AND SIMETHICONE 2400; 2400; 240 MG/30ML; MG/30ML; MG/30ML
30 SUSPENSION ORAL EVERY 4 HOURS PRN
Status: DISCONTINUED | OUTPATIENT
Start: 2020-09-25 | End: 2020-10-01 | Stop reason: HOSPADM

## 2020-09-25 RX ORDER — OLANZAPINE 10 MG/1
10 TABLET ORAL
Status: DISCONTINUED | OUTPATIENT
Start: 2020-09-25 | End: 2020-10-01 | Stop reason: HOSPADM

## 2020-09-25 RX ORDER — BUPROPION HYDROCHLORIDE 100 MG/1
100 TABLET, EXTENDED RELEASE ORAL 2 TIMES DAILY
Status: ON HOLD | COMMUNITY
End: 2020-09-30

## 2020-09-25 RX ADMIN — GABAPENTIN 400 MG: 400 CAPSULE ORAL at 20:08

## 2020-09-25 RX ADMIN — QUETIAPINE FUMARATE 100 MG: 100 TABLET ORAL at 03:19

## 2020-09-25 RX ADMIN — BUPROPION HYDROCHLORIDE 100 MG: 100 TABLET, EXTENDED RELEASE ORAL at 20:08

## 2020-09-25 ASSESSMENT — ACTIVITIES OF DAILY LIVING (ADL)
RETIRED_EATING: 0-->INDEPENDENT
LAUNDRY: WITH SUPERVISION
BATHING: 0-->INDEPENDENT
RETIRED_COMMUNICATION: 0-->UNDERSTANDS/COMMUNICATES WITHOUT DIFFICULTY
COGNITION: 0 - NO COGNITION ISSUES REPORTED
SWALLOWING: 0-->SWALLOWS FOODS/LIQUIDS WITHOUT DIFFICULTY
TRANSFERRING: 0-->INDEPENDENT
FALL_HISTORY_WITHIN_LAST_SIX_MONTHS: NO
TOILETING: 0-->INDEPENDENT
ORAL_HYGIENE: INDEPENDENT
HYGIENE/GROOMING: INDEPENDENT
DRESS: 0-->INDEPENDENT
DRESS: INDEPENDENT
AMBULATION: 0-->INDEPENDENT

## 2020-09-25 ASSESSMENT — ENCOUNTER SYMPTOMS
CONFUSION: 0
ABDOMINAL PAIN: 0
HALLUCINATIONS: 0
FEVER: 0
SHORTNESS OF BREATH: 0

## 2020-09-25 ASSESSMENT — MIFFLIN-ST. JEOR: SCORE: 1942.09

## 2020-09-25 NOTE — TELEPHONE ENCOUNTER
S: Alanna gave clinical saying pt was bib ems to [facility] er due to SI came last night  B: pt has may plans including to jump off a bridge or jump in front of a train. [place] [facility] [place] due to SI and was d/c'ed. He then called 911 and was brought to [facility] ed. He spent the night here.It appears he was under the influence of cocaine and meth. He states he has not used in 3-4 days. He was reassessed today and continues to express SI with plans. He attempted suicide by od less than 6 mo's ago. He was admitted to [facility] at the time. Stressor is that he says his son is on death row. Utox ordered. No Covid symp's. Covid test will be ordered per Alanna. No chronic med prob's besides Type II Diabetes. He is homeless.   A: coop, vol, walking indep, SI, med cleared  R: Patient cleared and ready for behavioral bed placement: Yes   Paged Melita at 2:22 pm. meagan Hua called back at 2:49 saying she will review chart and will call intake back. meagan  #10/melita accepted for herself on #10 instead of 3b per her request               Unit notified at 3:05 pm/left msg for Rn to call back            Er notified at 3:12 pm           Passed to yuly staff        meagan

## 2020-09-25 NOTE — ED NOTES
Bed: ED10  Expected date: 9/25/20  Expected time: 2:09 AM  Means of arrival:   Comments:  [id number]  60 M  SI

## 2020-09-25 NOTE — ED NOTES
Emergency Department Patient Sign-out       Brief HPI:  This is a 60 year old male signed out to me by Dr. Alvares.  See initial ED Provider note for details of the presentation.          Patient is medically cleared for admission to a Behavioral Health unit.      The patient is not on a hold.      The patient has not required medication for agitation.    Awaiting Behavioral Health Assessment (DEC)        Significant Events prior to my assuming care: None      Exam:   Patient Vitals for the past 24 hrs:   BP Temp Temp src Pulse SpO2   09/25/20 1120 115/71 97.2  F (36.2  C) Oral 97 100 %   09/25/20 0100 (!) 151/86 98.5  F (36.9  C) Oral 92 100 %           ED RESULTS:   Results for orders placed or performed during the hospital encounter of 09/25/20 (from the past 24 hour(s))   Alcohol breath test POCT     Status: Normal    Collection Time: 09/25/20  3:22 AM   Result Value Ref Range    Alcohol Breath Test 0.000 0.00 - 0.01       ED MEDICATIONS:   Medications   QUEtiapine (SEROquel) tablet 100 mg (100 mg Oral Given 9/25/20 0319)         Impression:    ICD-10-CM    1. Suicidal thoughts  R45.851        Plan:    60-year-old male with a history of multiple admissions to psychiatric unit for suicidal ideation.  Patient had initially been seen in the milieu who of recent substance abuse.  Subsequently, he was evaluated by behavioral emergency room .  Patient was seen and examined and maintains suicidality with a plan.  Will admit patient for further evaluation and management.      MD Luis Smipson Steven E, MD  09/25/20 4851

## 2020-09-25 NOTE — PLAN OF CARE
Problem: Adult Inpatient Plan of Care  Goal: Plan of Care Review  Outcome: No Change    Brody  is a 60 year-old man who is admitted from our ED on a voluntary basis for worsening depression and suicidal ideation.  Per chart review, pt has may plans including to jump off a bridge or jump in front of a train. He went to [facility] [place] [place] er last night due to SI and was d/c'ed. He then called 911 and was brought to [facility] ed. He spent the night here.It appears he was under the influence of cocaine and meth. He states he has not used in 3-4 days. He was reassessed today and continues to express SI with plans. He attempted suicide by od less than 6 mo's ago. He was admitted to [facility] at the time. Stressor is that he says his son is on death row.    Upon interview, pt presented with a depressed mood and blunted affect. Reported having passive suicidal ideations but denied any plan to act on them. Pt was cooperative and polite.   Plan: Status 15s; Build trust with pt. Continue to build on strengths. Encourage healthy coping.     Admission Notification: Refuses    Encouraged to notify staff if he needs assistance.

## 2020-09-25 NOTE — ED PROVIDER NOTES
History     Chief Complaint   Patient presents with     Suicidal     Tired of being homeless. Picked up in front of [facility] and wanted to come here.     KADEEM  Brody Colindres is a 60 year old male who presents to us with a chief complaint of suicidal ideation.  He states his life is not going well and he often thinks of suicide but is thinking worse recently.  He states he would likely overdose.  He has overdosed in the last several months and admitted to cocaine abuse recently.  He was discharged from a psychiatric unit in the last few months.  He states he has been taking his meds as directed.    I have reviewed the Medications, Allergies, Past Medical and Surgical History, and Social History in the [employer] system.  PAST MEDICAL HISTORY:   Past Medical History:   Diagnosis Date     Anxiety      Depressive disorder      Diabetes (H)      Schizophrenia (H)        PAST SURGICAL HISTORY: History reviewed. No pertinent surgical history.    Past medical history, past surgical history, medications, and allergies were reviewed with the patient. Additional pertinent items: None    FAMILY HISTORY: History reviewed. No pertinent family history.    SOCIAL HISTORY:   Social History     Tobacco Use     Smoking status: Heavy Tobacco Smoker     Packs/day: 1.00     Smokeless tobacco: Never Used   Substance Use Topics     Alcohol use: Not Currently     Alcohol/week: 1.0 standard drinks     Types: 1 Cans of beer per week     Frequency: Never     Social history was reviewed with the patient. Additional pertinent items: None        Patient's Medications   New Prescriptions    No medications on file   Previous Medications    BUPROPION (WELLBUTRIN SR) 100 MG 12 HR TABLET    Take 1 tablet (100 mg) by mouth 2 times daily    BUPROPION (WELLBUTRIN SR) 150 MG 12 HR TABLET    Take 1 tablet (150 mg) by mouth 2 times daily for 3 days    BUPROPION (WELLBUTRIN XL) 150 MG 24 HR TABLET    Take 150 mg by mouth daily    GABAPENTIN (NEURONTIN) 400 MG  CAPSULE    Take 400 mg by mouth 3 times daily    METFORMIN (GLUCOPHAGE) 1000 MG TABLET    Take 1 tablet (1,000 mg) by mouth 2 times daily (with meals)    METFORMIN (GLUCOPHAGE) 1000 MG TABLET    Take 1 tablet (1,000 mg) by mouth 2 times daily (with meals) for 3 days    PREGABALIN (LYRICA) 25 MG CAPSULE    Take 25 mg by mouth 3 times daily   Modified Medications    No medications on file   Discontinued Medications    No medications on file        No Known Allergies     Review of Systems   Constitutional: Negative for fever.   Respiratory: Negative for shortness of breath.    Cardiovascular: Negative for chest pain.   Gastrointestinal: Negative for abdominal pain.   Psychiatric/Behavioral: Positive for suicidal ideas. Negative for confusion, hallucinations and self-injury.   All other systems reviewed and are negative.        Physical Exam   BP: (!) 151/86  Pulse: 92  Temp: 98.5  F (36.9  C)  SpO2: 100 %      Physical Exam  Vitals signs and nursing note reviewed.   Constitutional:       General: He is not in acute distress.     Appearance: He is well-developed.   HENT:      Head: Normocephalic.   Eyes:      Extraocular Movements: Extraocular movements intact.   Neck:      Musculoskeletal: Neck supple.   Pulmonary:      Effort: No respiratory distress.   Musculoskeletal:         General: No deformity.   Skin:     General: Skin is dry.   Neurological:      Mental Status: He is alert.      Comments: Oriented   Psychiatric:         Behavior: Behavior normal.      Comments: Suicidal thoughts         ED Course        Procedures    Results for orders placed or performed during the hospital encounter of 09/25/20 (from the past 24 hour(s))   Alcohol breath test POCT   Result Value Ref Range    Alcohol Breath Test 0.000 0.00 - 0.01     Medications   QUEtiapine (SEROquel) tablet 100 mg (100 mg Oral Given 9/25/20 0319)      Results for orders placed or performed during the hospital encounter of 09/25/20   Alcohol breath test POCT      Status: Normal   Result Value Ref Range    Alcohol Breath Test 0.000 0.00 - 0.01            Assessments & Plan (with Medical Decision Making)   52-year-old male presents to us with a chief complaint of suicidal ideation.  He was evaluated by myself as well as 1 of the mental health assessors.  He has been admitted multiple times to the psychiatric unit.  The patient generally signed out before treatment is completed and does not seem to follow-up on recommended treatment or pursue aftercare.  We will give him a dose of Seroquel at this time.  We will allow him to sleep in the department and he will be reassessed in the morning.     I have reviewed the nursing notes.    I have reviewed the findings, diagnosis, plan and need for follow up with the patient.    New Prescriptions    No medications on file       Final diagnoses:   Suicidal thoughts       9/25/2020   [facility], [place], EMERGENCY DEPARTMENT     Tanner Alvares,   09/25/20 0700

## 2020-09-25 NOTE — PROGRESS NOTES
09/25/20 0685   Patient Belongings   Did you bring any home meds/supplements to the hospital?  Yes   Disposition of meds  Other (see comment)   Patient Belongings locker;sent to security per site process   Patient Belongings Put in Hospital Secure Location (Security or Locker, etc.) cash/credit card;clothing;glasses;medication(s);wallet;shoes;Jehovah's witness item   Belongings Search Yes   Clothing Search Yes   Second Staff Tai GAMBOA   Comment See note   Locker:  -Belt  -lighter x4  -hoodie w/ drawstring  -facemask x8  -socks x13  -bandana x2  -cigarettes  -jeans x4  -shoes w/ laces  -winter hat  -t shirt  -pocket bible x3  -assorted band-aids, pens, and hygiene products  -flashlight  -glasses x3  -Music CD' x2  -condoms x4  -Finger/toe nail kit  -water bottle  -umbrella   -plastic bag w/ assorted snack foods  -baseball hat  -wash cloth  -underwear x3  -[employer] book  -Folder w/ assorted paperwork  -Wallet w/ assorted Id's, scrap paper, and receipts      Sent to Security:  -[employer] gift card x4  -[employer] gift card  -[employer] Gift card  -Netspend debit (8991)  -Debit ([phone])    Contraband:  -Pocket knife  -face razor x7    Pt Medications kept on unit per policy    A               Admission:  I am responsible for any personal items that are not sent to the safe or pharmacy.  [facility] is not responsible for loss, theft or damage of any property in my possession.    Signature:  _________________________________ Date: _______  Time: _____                                              Staff Signature:  ____________________________ Date: ________  Time: _____      2nd Staff person, if patient is unable/unwilling to sign:    Signature: ________________________________ Date: ________  Time: _____     Discharge:  [facility] has returned all of my personal belongings:    Signature: _________________________________ Date: ________  Time: _____                                          Staff Signature:  ____________________________ Date:  ________  Time: _____

## 2020-09-25 NOTE — PHARMACY-ADMISSION MEDICATION HISTORY
Admission Medication History Completed by Pharmacy    See [facility] Admission Navigator for allergy information, preferred outpatient pharmacy, prior to admission medications and immunization status.     Medication History Sources:     Patient, [facility] prescription filling records, [facility] Everywhere    Changes made to PTA medication list (reason):    Added: Seroquel, Remeron    Deleted: Lyrica    Changed: None    Additional Information:    Confirmed medications with Brody.    He reported he has not taken his medications in a couple weeks.    Prior to Admission medications    Medication Sig Last Dose Taking? Auth Provider   buPROPion (WELLBUTRIN SR) 100 MG 12 hr tablet Take 100 mg by mouth 2 times daily Couple weeks ago Yes Unknown, Entered By History   gabapentin (NEURONTIN) 400 MG capsule Take 400 mg by mouth 3 times daily Couple weeks ago Yes Reported, Patient   metFORMIN (GLUCOPHAGE) 1000 MG tablet Take 1 tablet (1,000 mg) by mouth 2 times daily (with meals) Couple weeks ago Yes Orestes Aldridge MD   mirtazapine (REMERON) 15 MG tablet Take 15 mg by mouth At Bedtime Couple weeks ago Yes Unknown, Entered By History   QUEtiapine (SEROQUEL) 50 MG tablet Take 50 mg by mouth nightly as needed Couple weeks ago Yes Unknown, Entered By History       Date completed: 09/25/20    Medication history completed by: Jennifer Vitale, PharmD, BCPP

## 2020-09-25 NOTE — ED NOTES
ED to Behavioral Floor Handoff    SITUATION  Brody Colindres is a 60 year old male who speaks English and lives is homeless unknown The patient arrived in the ED by private car from home with a complaint of Suicidal (Tired of being homeless. [place] [facility] [place] here.)  .The patient's current symptoms started/worsened 3 day(s) ago and during this time the symptoms have increased.   In the ED, pt was diagnosed with   Final diagnoses:   Suicidal thoughts        Initial vitals were: BP: (!) 151/86  Pulse: 92  Temp: 98.5  F (36.9  C)  SpO2: 100 %   --------  Is the patient diabetic? No   If yes, last blood glucose? --     If yes, was this treated in the ED? --  --------  Is the patient inebriated (ETOH) No or Impaired on other substances? No  MSSA done? N/A  Last MSSA score: --    Were withdrawal symptoms treated? N/A  Does the patient have a seizure history? No. If yes, date of most recent seizure--  --------  Is the patient patient experiencing suicidal ideation? reports suicidal ideation with out intention or a suicidal plan    Homicidal ideation? denies current or recent homicidal ideation or behaviors.    Self-injurious behavior/urges? denies current or recent self injurious behavior or ideation.  ------  Was pt aggressive in the ED No  Was a code called No  Is the pt now cooperative? Yes  -------  Meds given in ED:   Medications   QUEtiapine (SEROquel) tablet 100 mg (100 mg Oral Given 9/25/20 0319)      Family present during ED course? No  Family currently present? No    BACKGROUND  Does the patient have a cognitive impairment or developmental disability? No  Allergies: No Known Allergies.   Social demographics are   Social History     Socioeconomic History     Marital status: Single     Spouse name: None     Number of children: None     Years of education: None     Highest education level: None   Occupational History     None   Social Needs     Financial resource strain: None     Food  insecurity     Worry: None     Inability: None     Transportation needs     Medical: None     Non-medical: None   Tobacco Use     Smoking status: Heavy Tobacco Smoker     Packs/day: 1.00     Smokeless tobacco: Never Used   Substance and Sexual Activity     Alcohol use: Not Currently     Alcohol/week: 1.0 standard drinks     Types: 1 Cans of beer per week     Frequency: Never     Drug use: Yes     Types: Cocaine, Methamphetamines, Marijuana     Comment:  smoked today 9/23     Sexual activity: Yes     Partners: Female     Birth control/protection: Condom   Lifestyle     Physical activity     Days per week: None     Minutes per session: None     Stress: None   Relationships     Social connections     Talks on phone: None     Gets together: None     Attends [employer]ism service: None     Active member of club or organization: None     Attends meetings of clubs or organizations: None     Relationship status: None     Intimate partner violence     Fear of current or ex partner: None     Emotionally abused: None     Physically abused: None     Forced sexual activity: None   Other Topics Concern     None   Social History Narrative     None        ASSESSMENT  Labs results   Labs Ordered and Resulted from Time of ED Arrival Up to the Time of Departure from the ED   ALCOHOL BREATH TEST POCT - Normal   DRUG ABUSE SCREEN 6 CHEM DEP URINE ([facility])      Imaging Studies: No results found for this or any previous visit (from the past 24 hour(s)).   Most recent vital signs /71   Pulse 97   Temp 97.2  F (36.2  C) (Oral)   SpO2 100%    Abnormal labs/tests/findings requiring intervention:---   Pain control: pt had none  Nausea control: pt had none    RECOMMENDATION  Are any infection precautions needed (MRSA, VRE, etc.)? No If yes, what infection? --  ---  Does the patient have mobility issues? independently. If yes, what device does the pt use? ---  ---  Is patient on 72 hour hold or commitment? No If on 72 hour hold, have hold and  rights been given to patient? N/A  Are admitting orders written if after 10 p.m. ?N/A  Tasks needing to be completed:---     Shannon Ambriz RN   [facility]-- [phone] [facility]   [phone] [facility]

## 2020-09-25 NOTE — ED TRIAGE NOTES
Pt states he is going to kill himself, there is not point to living and he is defiantly going to do it

## 2020-09-25 NOTE — ED NOTES
Santiago (RN) performed safety screen. Pt changed into scrubs. Collected sweatshirt, wallet, pants, belts, lighter, packs of cigarettes, jeans, 1 backpack, bandana, facemask, cards, beanie, shirt, and shoes.

## 2020-09-26 LAB
GLUCOSE BLDC GLUCOMTR-MCNC: 136 MG/DL (ref 70–99)
GLUCOSE BLDC GLUCOMTR-MCNC: 182 MG/DL (ref 70–99)
GLUCOSE BLDC GLUCOMTR-MCNC: 197 MG/DL (ref 70–99)
LABORATORY COMMENT REPORT: NORMAL
SARS-COV-2 RNA SPEC QL NAA+PROBE: NEGATIVE
SPECIMEN SOURCE: NORMAL

## 2020-09-26 PROCEDURE — 99223 1ST HOSP IP/OBS HIGH 75: CPT | Mod: AI | Performed by: PSYCHIATRY & NEUROLOGY

## 2020-09-26 PROCEDURE — 00000146 ZZHCL STATISTIC GLUCOSE BY METER IP

## 2020-09-26 PROCEDURE — 12400001 ZZH R&B MH UMMC

## 2020-09-26 PROCEDURE — 25000132 ZZH RX MED GY IP 250 OP 250 PS 637: Performed by: PSYCHIATRY & NEUROLOGY

## 2020-09-26 RX ADMIN — GABAPENTIN 400 MG: 400 CAPSULE ORAL at 20:14

## 2020-09-26 RX ADMIN — BUPROPION HYDROCHLORIDE 100 MG: 100 TABLET, EXTENDED RELEASE ORAL at 20:14

## 2020-09-26 RX ADMIN — GABAPENTIN 400 MG: 400 CAPSULE ORAL at 08:11

## 2020-09-26 RX ADMIN — BUPROPION HYDROCHLORIDE 100 MG: 100 TABLET, EXTENDED RELEASE ORAL at 08:11

## 2020-09-26 RX ADMIN — GABAPENTIN 400 MG: 400 CAPSULE ORAL at 14:19

## 2020-09-26 ASSESSMENT — ACTIVITIES OF DAILY LIVING (ADL)
ORAL_HYGIENE: INDEPENDENT
DRESS: INDEPENDENT;SCRUBS (BEHAVIORAL HEALTH)
DRESS: SCRUBS (BEHAVIORAL HEALTH)
LAUNDRY: WITH SUPERVISION
HYGIENE/GROOMING: INDEPENDENT
LAUNDRY: WITH SUPERVISION
ORAL_HYGIENE: INDEPENDENT
HYGIENE/GROOMING: PROMPTS

## 2020-09-26 NOTE — PLAN OF CARE
"48 hour Nursing Observation    Diagnosis: Suicidal thoughts [R45.851]  Admit Date: 9/25/2020  Precautions: Orders Placed This Encounter      Assault precautions      Single Room     Recent Vitals: /83 (BP Location: Left arm)   Pulse 86   Temp 98.6  F (37  C) (Oral)   Resp 18   Ht 1.93 m (6' 4\")   Wt 102.1 kg (225 lb)   SpO2 100%   BMI 27.39 kg/m  ,  , Sitting Orthostatic BP: 129/86, Standing Orthostatic BP: 110/77     Sleep:  Hours of sleep at night: 5.5    General Shift Summary  Patient has been visible in milieu, does not interact with peers, did not attend group. He voiced having depression and some anxiety. Voiced SI without a plan and stated she could contract for safety. He denied HI/SIB/AVH. Patient was provided his personal soap for a shower, he requested his clothing as well but was informed that we could not give him his jeans. Patient took most of his meds refusing his Metformin, he was educated on the importance of taking it however stated that it gave him a stomach ache and would not take it. He is eating well. Denied any pain.    Plan is to continue to monitor patient status q 15 mins, assess response to medications, and maintain the patients safety.    Morena Rogers RN MSN    "

## 2020-09-26 NOTE — PROGRESS NOTES
"Pt has been present in milieu watching movies for part of the shift, then returned to room and was isolative the later half of the shift. Withdrawn from peers and staff. Pt report SI, and when asked to be more specific he said \"just thoughts\". Pt denies SIB and hallucinations. Pt did not seem like they wanted to talk much during 1:1, answered very minimally and stared down at the ground the entire check-in. Blunted, tense affect. Good appetite and good sleep. Pt reports both anxiety and depression as an 8/10.     09/26/20 1851   Behavioral Health   Hallucinations denies / not responding to hallucinations   Thinking distractable;poor concentration   Orientation person: oriented;place: oriented;date: oriented;time: oriented   Memory baseline memory   Insight poor   Judgement impaired   Eye Contact at floor   Affect tense;blunted, flat   Mood depressed;mood is calm   Physical Appearance/Attire disheveled;appears stated age   Hygiene neglected grooming - unclean body, hair, teeth   Suicidality thoughts only   Self Injury other (see comment)  (denies)   Activity withdrawn   Speech coherent   Medication Sensitivity no stated side effects;no observed side effects   Psychomotor / Gait balanced;steady   Activities of Daily Living   Hygiene/Grooming prompts   Oral Hygiene independent   Dress scrubs (behavioral health)   Laundry with supervision   Room Organization independent     "
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
## 2020-09-27 LAB
GLUCOSE BLDC GLUCOMTR-MCNC: 151 MG/DL (ref 70–99)
GLUCOSE BLDC GLUCOMTR-MCNC: 258 MG/DL (ref 70–99)
GLUCOSE BLDC GLUCOMTR-MCNC: 261 MG/DL (ref 70–99)

## 2020-09-27 PROCEDURE — 00000146 ZZHCL STATISTIC GLUCOSE BY METER IP

## 2020-09-27 PROCEDURE — 12400001 ZZH R&B MH UMMC

## 2020-09-27 PROCEDURE — 25000132 ZZH RX MED GY IP 250 OP 250 PS 637: Performed by: PSYCHIATRY & NEUROLOGY

## 2020-09-27 RX ADMIN — BUPROPION HYDROCHLORIDE 100 MG: 100 TABLET, EXTENDED RELEASE ORAL at 20:26

## 2020-09-27 RX ADMIN — NICOTINE POLACRILEX 4 MG: 4 LOZENGE ORAL at 08:44

## 2020-09-27 RX ADMIN — GABAPENTIN 400 MG: 400 CAPSULE ORAL at 15:22

## 2020-09-27 RX ADMIN — GABAPENTIN 400 MG: 400 CAPSULE ORAL at 08:45

## 2020-09-27 RX ADMIN — GABAPENTIN 400 MG: 400 CAPSULE ORAL at 20:26

## 2020-09-27 RX ADMIN — BUPROPION HYDROCHLORIDE 100 MG: 100 TABLET, EXTENDED RELEASE ORAL at 08:45

## 2020-09-27 ASSESSMENT — ACTIVITIES OF DAILY LIVING (ADL)
ORAL_HYGIENE: INDEPENDENT
HYGIENE/GROOMING: INDEPENDENT
LAUNDRY: WITH SUPERVISION
DRESS: INDEPENDENT

## 2020-09-27 ASSESSMENT — MIFFLIN-ST. JEOR: SCORE: 1957.97

## 2020-09-27 NOTE — PROGRESS NOTES
Patient is calm and cooperative. He has been in the lounge watching tv most of the day. He said his goal for the day was to watch football. He is social with others but has a flat affect with staff. He denies depression, anxiety, SI/SIB. Good concentration. He is taking care of his ADLs. He has been eating and sleeping well and said he has no pain right now. He has no other concerns he is just content watching football right now.
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
## 2020-09-27 NOTE — CONSULTS
Brief Medicine Note    Medicine was consulted for type 2 diabetes management as patient refusing Metformin given history of GI side effects. He has a history of poorly controlled type 2 diabetes with last Hgb A1C 8.6% this summer. I spoke with Dr. Kyle Hammond, primary provider and Psychiatrist regarding consult. At this time, given his history of uncontrolled type 2 diabetes - I would recommend formal Endocrine/Diabetes Consultation as this would provide patient with a better overall Diabetes management plan. Discussed to please consult medicine if any further questions or concerns regarding patient in the future. Dr. Hammond agreed and will consult Diabetes Team. Please call with any additional questions or concerns.     Sarah Ogden PA-C  Hospitalist Service  Pager [phone]

## 2020-09-28 LAB
ALBUMIN SERPL-MCNC: 3.2 G/DL (ref 3.4–5)
ALP SERPL-CCNC: 59 U/L (ref 40–150)
ALT SERPL W P-5'-P-CCNC: 16 U/L (ref 0–70)
ANION GAP SERPL CALCULATED.3IONS-SCNC: 4 MMOL/L (ref 3–14)
AST SERPL W P-5'-P-CCNC: 12 U/L (ref 0–45)
BASOPHILS # BLD AUTO: 0 10E9/L (ref 0–0.2)
BASOPHILS NFR BLD AUTO: 0.7 %
BILIRUB SERPL-MCNC: 0.4 MG/DL (ref 0.2–1.3)
BUN SERPL-MCNC: 12 MG/DL (ref 7–30)
CALCIUM SERPL-MCNC: 8.6 MG/DL (ref 8.5–10.1)
CHLORIDE SERPL-SCNC: 108 MMOL/L (ref 94–109)
CHOLEST SERPL-MCNC: 133 MG/DL
CO2 SERPL-SCNC: 27 MMOL/L (ref 20–32)
CREAT SERPL-MCNC: 0.96 MG/DL (ref 0.66–1.25)
DIFFERENTIAL METHOD BLD: NORMAL
EOSINOPHIL # BLD AUTO: 0.3 10E9/L (ref 0–0.7)
EOSINOPHIL NFR BLD AUTO: 4.9 %
ERYTHROCYTE [DISTWIDTH] IN BLOOD BY AUTOMATED COUNT: 12.7 % (ref 10–15)
GFR SERPL CREATININE-BSD FRML MDRD: 87 ML/MIN/{1.73_M2}
GLUCOSE BLDC GLUCOMTR-MCNC: 113 MG/DL (ref 70–99)
GLUCOSE BLDC GLUCOMTR-MCNC: 174 MG/DL (ref 70–99)
GLUCOSE BLDC GLUCOMTR-MCNC: 224 MG/DL (ref 70–99)
GLUCOSE BLDC GLUCOMTR-MCNC: 247 MG/DL (ref 70–99)
GLUCOSE SERPL-MCNC: 134 MG/DL (ref 70–99)
HBA1C MFR BLD: 7.7 % (ref 0–5.6)
HCT VFR BLD AUTO: 40.2 % (ref 40–53)
HDLC SERPL-MCNC: 54 MG/DL
HGB BLD-MCNC: 14.1 G/DL (ref 13.3–17.7)
IMM GRANULOCYTES # BLD: 0 10E9/L (ref 0–0.4)
IMM GRANULOCYTES NFR BLD: 0.4 %
LDLC SERPL CALC-MCNC: 60 MG/DL
LYMPHOCYTES # BLD AUTO: 2.2 10E9/L (ref 0.8–5.3)
LYMPHOCYTES NFR BLD AUTO: 39.9 %
MCH RBC QN AUTO: 31 PG (ref 26.5–33)
MCHC RBC AUTO-ENTMCNC: 35.1 G/DL (ref 31.5–36.5)
MCV RBC AUTO: 88 FL (ref 78–100)
MONOCYTES # BLD AUTO: 0.5 10E9/L (ref 0–1.3)
MONOCYTES NFR BLD AUTO: 9.8 %
NEUTROPHILS # BLD AUTO: 2.4 10E9/L (ref 1.6–8.3)
NEUTROPHILS NFR BLD AUTO: 44.3 %
NONHDLC SERPL-MCNC: 79 MG/DL
NRBC # BLD AUTO: 0 10*3/UL
NRBC BLD AUTO-RTO: 0 /100
PLATELET # BLD AUTO: 223 10E9/L (ref 150–450)
POTASSIUM SERPL-SCNC: 4.2 MMOL/L (ref 3.4–5.3)
PROT SERPL-MCNC: 7.3 G/DL (ref 6.8–8.8)
RBC # BLD AUTO: 4.55 10E12/L (ref 4.4–5.9)
SODIUM SERPL-SCNC: 139 MMOL/L (ref 133–144)
TRIGL SERPL-MCNC: 95 MG/DL
TSH SERPL DL<=0.005 MIU/L-ACNC: 0.64 MU/L (ref 0.4–4)
WBC # BLD AUTO: 5.5 10E9/L (ref 4–11)

## 2020-09-28 PROCEDURE — 12400001 ZZH R&B MH UMMC

## 2020-09-28 PROCEDURE — 25000131 ZZH RX MED GY IP 250 OP 636 PS 637: Performed by: NURSE PRACTITIONER

## 2020-09-28 PROCEDURE — 83036 HEMOGLOBIN GLYCOSYLATED A1C: CPT | Performed by: PSYCHIATRY & NEUROLOGY

## 2020-09-28 PROCEDURE — 83036 HEMOGLOBIN GLYCOSYLATED A1C: CPT | Performed by: NURSE PRACTITIONER

## 2020-09-28 PROCEDURE — 84443 ASSAY THYROID STIM HORMONE: CPT | Performed by: PSYCHIATRY & NEUROLOGY

## 2020-09-28 PROCEDURE — 36415 COLL VENOUS BLD VENIPUNCTURE: CPT | Performed by: PSYCHIATRY & NEUROLOGY

## 2020-09-28 PROCEDURE — 80061 LIPID PANEL: CPT | Performed by: PSYCHIATRY & NEUROLOGY

## 2020-09-28 PROCEDURE — 80053 COMPREHEN METABOLIC PANEL: CPT | Performed by: PSYCHIATRY & NEUROLOGY

## 2020-09-28 PROCEDURE — H0001 ALCOHOL AND/OR DRUG ASSESS: HCPCS | Mod: HF

## 2020-09-28 PROCEDURE — 99233 SBSQ HOSP IP/OBS HIGH 50: CPT | Performed by: NURSE PRACTITIONER

## 2020-09-28 PROCEDURE — 85025 COMPLETE CBC W/AUTO DIFF WBC: CPT | Performed by: PSYCHIATRY & NEUROLOGY

## 2020-09-28 PROCEDURE — 00000146 ZZHCL STATISTIC GLUCOSE BY METER IP

## 2020-09-28 PROCEDURE — 25000132 ZZH RX MED GY IP 250 OP 250 PS 637: Performed by: PSYCHIATRY & NEUROLOGY

## 2020-09-28 RX ORDER — DEXTROSE MONOHYDRATE 25 G/50ML
25-50 INJECTION, SOLUTION INTRAVENOUS
Status: DISCONTINUED | OUTPATIENT
Start: 2020-09-28 | End: 2020-10-01 | Stop reason: HOSPADM

## 2020-09-28 RX ORDER — NICOTINE POLACRILEX 4 MG
15-30 LOZENGE BUCCAL
Status: DISCONTINUED | OUTPATIENT
Start: 2020-09-28 | End: 2020-10-01 | Stop reason: HOSPADM

## 2020-09-28 RX ADMIN — INSULIN ASPART 12 UNITS: 100 INJECTION, SOLUTION INTRAVENOUS; SUBCUTANEOUS at 13:30

## 2020-09-28 RX ADMIN — BUPROPION HYDROCHLORIDE 100 MG: 100 TABLET, EXTENDED RELEASE ORAL at 22:07

## 2020-09-28 RX ADMIN — INSULIN ASPART 10 UNITS: 100 INJECTION, SOLUTION INTRAVENOUS; SUBCUTANEOUS at 18:20

## 2020-09-28 RX ADMIN — BUPROPION HYDROCHLORIDE 100 MG: 100 TABLET, EXTENDED RELEASE ORAL at 09:11

## 2020-09-28 RX ADMIN — INSULIN ASPART 6 UNITS: 100 INJECTION, SOLUTION INTRAVENOUS; SUBCUTANEOUS at 09:12

## 2020-09-28 RX ADMIN — GABAPENTIN 400 MG: 400 CAPSULE ORAL at 22:07

## 2020-09-28 RX ADMIN — INSULIN GLARGINE 20 UNITS: 100 INJECTION, SOLUTION SUBCUTANEOUS at 09:11

## 2020-09-28 RX ADMIN — GABAPENTIN 400 MG: 400 CAPSULE ORAL at 09:11

## 2020-09-28 RX ADMIN — GABAPENTIN 400 MG: 400 CAPSULE ORAL at 13:30

## 2020-09-28 ASSESSMENT — ACTIVITIES OF DAILY LIVING (ADL)
DRESS: INDEPENDENT
LAUNDRY: WITH SUPERVISION
ORAL_HYGIENE: INDEPENDENT
HYGIENE/GROOMING: INDEPENDENT

## 2020-09-28 NOTE — PLAN OF CARE
"  Problem: Adult Inpatient Plan of Care  Goal: Plan of Care Review  Outcome: Improving    Pt was visible in the milieu almost the entire shift watching TV. Described his mood as \" good.\"  Denied SI/HI/AH. Reported depression 8 and anxiety 6 on a 10 scale. Pt had a full range affect while watching the ball game in which he seemed very invested.  Denied any physical issues. Declined his Metformin because he stated \" It hurts my stomach.\"  Otherwise was cooperative and pleasant on approach. No behavioral issues noted. No medication s/e reported or observed.   Plan: Status 15s; Build trust with pt. Continue to build on strengths.   "

## 2020-09-28 NOTE — PROGRESS NOTES
Work Completed:  Chart review  Team meeting  CTC had patient completed CD worksheet, which was subsequently sent to Maribell Francis and Livia Barahona as requested.     Discharge plan or goal: To CD treatment                Barriers to discharge: mental health stabilization and secure CD treatment

## 2020-09-28 NOTE — PLAN OF CARE
BEHAVIORAL TEAM DISCUSSION    Participants: Melita Dumont NP; Nisha HARRISSW; Traci Karimi RN  Progress: minimal  Anticipated length of stay: TBD  Continued Stay Criteria/Rationale: Patient was recently admitted, evaluation in process.  Medical/Physical: no acute medical issues  Precautions:   Behavioral Orders   Procedures     Assault precautions     Code 1 - Restrict to Unit     Routine Programming     As clinically indicated     Single Room     Status 15     Every 15 minutes.     Plan: Psychiatric evaluation; medication evaluation and adjustments as appropriate. CD consult and referrals as indicated by evaluation.  Rationale for change in precautions or plan: initial plan.

## 2020-09-28 NOTE — PROGRESS NOTES
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 health related issues, overdose(s) or hospitalizations related to your use of alcohol or other drugs: no    8. Do you have any specific physical needs/accommodations? no    Dimension II Ratings   Biomedical Conditions and Complications - The placing authority must use the criteria in Dimension II to determine a client s biomedical conditions and complications.   RISK DESCRIPTIONS - Severity ratin Client tolerates and angel with physical discomfort and is able to get hte services that the client needs.    REASONS SEVERITY WAS ASSIGNED (What physical/medical problems does this person have that would inhibit his or her ability to participate in treatment? What issues does he or she have that require assistance to address?)  Pt reported having type 2 diabetes and pain in feet, hands, and Hips. Pt denied any pain during this assessment. Pt  reported pain is well controlled with medications.     DIMENSION III - Emotional, Behavioral, Cognitive Conditions and Complications   1. (Optional) Tell me what it was like growing up in your family. (substance use, mental health, discipline, abuse, support)  Pt reported he was raised by his mother. Pt reported he is the oldest of 7 siblings and has 3 brther and 3 sisters.   CHILDHOOD/FAMILY LIFE- Pt reported he had a pretty good childhood.   PARENTS- Pt reported his mother worked for [employer] for 30 years until [employer]. Pt reported he did not know his father, but believed he worked construction and passed away in .   SIBLINGS-Pt reported his siblings are doing well in [place], Pt reported 1 of his sisters  in 2019.     Substance Use;  Pt reported his siblings drink alcohol socially, but denied any of them having substance use problems.     Mental Health;   Pt reported being told his maternal gran father had a mental illness.  Abuse;  Pt denied experiencing any sort of abuse growing up.     2. When was the last time that you had significant problems   A. With feeling very trapped, lonely, sad, blue, depressed or hopeless about the future? Patient reported being afraid of the voices teling him to harm himself. Pt also repoyted daily depression since the death of his mother 18.  B. With sleep trouble, such as bad dreams, sleeping restlessly, or falling asleep during the day? Pt reported he has srtuggled constantly with trying to sleep.     C. With feeling very anxious, nervous, tense, scared, panicked, or like something bad was going to happen? Pt reported feeling more anxious recently.     D. With becoming very distressed and upset when something reminded you of the past? Pt reported daily occurrance with feeling distressed about the death of his mother.     E. With thinking about ending your life or committing suicide? Pt reported being afraid of the voices telling him to jump in front of the  train. Pt said the voices have stopped since he entered the hospital and began taking medications.     3. When was the last time that you did the following things two or more times?  A. Lied or conned to get things you wanted or to avoid having to do something? Never     B. Had a hard time paying attention at school, work, or home? Never    C. Had a hard time listening to instructions at school, work, or home? Never    D. Were a bully or threatened other people? Pt reported he threatened another johana about the TV while at [facility] [place]    E. Started physical fights with other people? Never    Note: These questions are from the Global Appraisal of Individual Needs--Short Screener. Any item marked  past month  or  2 to 12 months ago  will be scored with a severity rating of at least 2. For each item that has occurred in the past month or past year ask follow up questions to determine how often the person has felt this way or has the behavior occurred? How recently? How has it affected their daily living? And, whether they were using or in withdrawal at the time?  Pt reported his answers to 2A, B, C, and D are related to his mental health and grief & Loss surrounding the death of his mother.     Any history of suicide in your family? Or someone close to you? no    4B. If the person answered item 2E  in the past month  ask about  intent, plan, means and access and any other follow-up information  to determine imminent risk. Document any actions taken to intervene  on any identified imminent risk.     Pt denied his is not currently feeling SI/HI/SIB. Pt reported being stable mentally with medications.     5A. Have you ever been diagnosed with a mental health problem? yes, Explain: Pt reported diagnosis od PTSD, Anxiety, paranoid schizophrenia,.  5B. Are you receiving care for any mental health issues? yes If yes, what is the focus of that care or treatment? Are you satisfied with the service? Most recent appointment? How has it  been helpful?    Pt reported he attends mental health groups on the unit in addition to individual therapy and medications.    6A. Have you been prescribed medications for emotional/psychological problems? yes  6B. Current mental health medication(s) If these medications are listed for Dimension II, reference item II-5.    Wellbutrin XL, Gabapentin    6C. Are you taking your medications as instructed? yes    7A. Does your MH provider know about your use? yes  7B. What does he or she have to say about it? (DSM) Pt reported he has been asked about what substances he uses.     8A. Have you ever been verbally, emotionally, physically or sexually abused? no  Follow up questions to learn current risk, continuing emotional impact. n/a  8B. Have you received counseling for abuse? no    9A. Have you ever experienced or been part of a group that experienced community violence, historical trauma, rape or assault? no  9B. How has that affected you? n/a  9C. Have you received counseling for that? no    10A. [place]: no  10B. Exposure to Combat? no    11. Do you have problems with any of the following things in your daily life? None    Note: If the person has any of the above problems, how do they deal with them, have they developed coping mechanisms? Have they received treatment? Follow up with items 12, 13, and 14. If none of the issues in item 11 are a problem for the person, skip to item 15.    Pt reported his main problems are maintaining medication compliance ands avoiding illicit drug use.     12. Have you been diagnosed with traumatic brain injury or Alzheimer s? no    13. If the answer to #12 is no, ask the following questions:    Have you ever hit your head or been hit on the head? no    Were you ever seen in the Emergency Room, hospital, or by a doctor because of an injury to your head? no    Have you had any significant illness that affected your brain (brain tumor, meningitis, West Nile Virus, stroke or seizure,  heart attack, near drowning or near suffocation)? no    14. If the answer to # 12 is yes, ask if any of the problems identified in #11 occurred since the head injury or loss of oxygen no    15A. Highest grade of school completed: H.S. Graduate  15B. Do you have a learning disability? no  15C. Did you ever have tutoring in Math or English? no.  15D. Have you ever been diagnosed with Fetal Alcohol Effects or Fetal Alcohol Syndrome? no    Explain:     16. If yes to item 15 B, C, or D: How has this affected your use or been affected by your use?   Pt denied any effects    Dimension III Ratings   Emotional/Behavioral/Cognitive - The placing authority must use the criteria in Dimension III to determine a client s emotional, behavioral, and cognitive conditions and complications.   RISK DESCRIPTIONS - Severity ratin Client has difficulty with impulse control and lacks coping skills. Client has thoughts of suicide or harm to others without means; however, the thoughts may interfere with participation in some treatment activities. Client has difficulty functioning in significant life areas. Client has moderate symptoms of emotional, behavioral, or cognitive problems. Client is able to participate in most treatment activities.    REASONS SEVERITY WAS ASSIGNED - What current issues might with thinking, feelings or behavior pose barriers to participation in a treatment program? What coping skills or other assets does the person have to offset those issues? Are these problems that can be initially accommodated by a treatment provider? If not, what specialized skills or attributes must a provider have?  Pt lacks coping skills needed to reduce and/or alleviate symptoms of his mental health.Pt reported inconsistent medication compliance. Pt also reported using llicit drugs to medicate past grief & Loss issues associated with the death of his mother and sister.     DIMENSION IV - Readiness for Change   1. You ve told me what  brought you here today. (first section) What do you think the problem really is? Pt reported he has not been inconsistent in taking prescribed medications. Pt also reported illicit drug use are his maim problems.   2. Tell me how things are going. Ask enough questions to determine whether the person has use related problems or assets that can be built upon in the following areas: Family/friends/relationships; Legal; Financial; Emotional; Educational; Recreational/ leisure; Vocational/employment; Living arrangements (DSM)     Overall- Pt reported he cannot complain, but would like to get permanent housing  Relationships-Pt reported things are good with family. Pt reported no relationships outside of family.   Legal- Pt denied any legal concerns at this time.   Financial-Pt reported he is ok financially.   Emotional-Pt reported he thinks about his mother and family a lot.   Education- Pt reported no plans or concerns in this area.   Recreation/Leisure-Pt reported going out to the movies and dinner.  Employment-Pt reported he currently receivers SSI monthly.   Living-Pt reported he sometimes wishes he was not living.     3. What activities have you engaged in when using alcohol/other drugs that could be hazardous to you or others (i.e. driving a car/motorcycle/boat, operating machinery, unsafe sex, sharing needles for drugs or tattoos, etc   Pt denied participation in any hazardous activities while using substances.     4. How much time do you spend getting, using or getting over using alcohol or drugs? (DSM)   Pt reported the amount of time spend on drugs would depend on the amount of money he has available.     5. Reasons for drinking/drug use (Use the space below to record answers. It may not be necessary to ask each item.)  Like the feeling No   Trying to forget problems Yes   To cope with stress Yes   To relieve physical pain Yes   To cope with anxiety Yes   To cope with depression Yes   To relax or unwind No    Makes it easier to talk with people No   Partner encourages use No   Most friends drink or use No   To cope with family problems Yes   Afraid of withdrawal symptoms/to feel better No   Other (specify) No     A. What concerns other people about your alcohol or drug use/Has anyone told you that you use too much? What did they say? (DSM)  Pt reported people who care about him do not know about his drug use.     B. What did you think about that/ do you think you have a problem with alcohol or drug use?   Pt reported he has a serious drug problem and wants to get help.     6. What changes are you willing to make? What substance are you willing to stop using? How are you going to do that? Have you tried that before? What interfered with your success with that goal?   Pt reported he wants to be abstinent from all illicit drug use.     7. What would be helpful to you in making this change?     Pt reported residential treatment would be helpful.     Dimension IV Ratings   Readiness for Change - The placing authority must use the criteria in Dimension IV to determine a client s readiness for change.   RISK DESCRIPTIONS - Severity ratin Client is motivated with active reinforcements.   REASONS SEVERITY WAS ASSIGNED - (What information did the person provide that supports your assessment of his or her readiness to change? How aware is the person of problems caused by continued use? How willing is she or he to make changes? What does the person feel would be helpful? What has the person been able to do without help?)  Pt appears motivated, but recent behaviors do not support verbalized statements for change. Pt appears to be in the contemplation stage of change regarding his substance use disorder.       DIMENSION V - Relapse, Continued Use, and Continued Problem Potential   1. In what ways have you tried to control, cut-down or quit your use? If you have had periods of sobriety, how did you accomplish that? What was  helpful? What happened to prevent you from continuing your sobriety? (DSM)   Pt reported he was sober from 2017 until 2019. Pt reported he relapsed close to the anniversary of his mother's death.  1B. What were the circumstances of your most recent relapse with mood altering chemicals?  Pt reported the trauma of his mother's death along with the death of his sister and pt has a son on death row.     2. Have you experienced cravings? If yes, ask follow up questions to determine if the person recognizes triggers and if the person has had any success in dealing with them.   Pt reported he does not have cravings, but says he just does drugs whenever he has money.    3A. Have you been treated for alcohol/other drug abuse/dependence? no  3B. Number of times (Lifetime) (over what period): n/a   3C. Number of times completed treatment (Lifetime): n/a  3D. During the past 3 years have you participated in outpatient and/or residential? no  3E. When and where? n/a  3F. What was helpful? n/a What was not? n/a    4. Support group participation: Have you/do you attend support group meetings to reduce/stop your alcohol/drug use? How recently? What was your experience? Are you willing to restart? If the person has not participated, is he or she willing?     Pt reported he has never attended sober support meetings, but would be willing to try NA.     5. What would assist you in staying sober/straight? Pt reported going to treatment would be helpful.     Dimension V Ratings   Relapse/Continued Use/Continued problem potential - The placing authority must use the criteria in Dimension V to determine a client s relapse, continued use, and continued problem potential.   RISK DESCRIPTIONS - Severity ratin No awareness of the negative impact of mental health problems or substance abuse. No coping skills to arrest mental health or addiction illnesses, or prevent relapse.    REASONS SEVERITY WAS ASSIGNED - (What information did  the person provide that indicates his or her understanding of relapse issues? What about the person s experience indicates how prone he or she is to relapse? What coping skills does the person have that decrease relapse potential?)   Pt lacks impulse control, drug refusal skills, and relapse prevention skills. Pt reported being inconsistent with mental health medication compliance. Pt repotted he uses illicit substances to deal with physical and emotional pain. Pt remains at high risk for continued use/relapse without proper interventions in place.     DIMENSION VI - Recovery Environment   1. Are you employed/attending school? Tell me about that.     Pt reported he receives SSI monthly.     2A. Describe a typical day; evening for you. Work, school, social, leisure, volunteer, spiritual practices. Include time spent obtaining, using, recovering from drugs or alcohol. (DSM)   Pt reported he just hangs out and walks around.    Please describe what leisure activities have been associated with your substance abuse:    Pt denied any leisure activities associated with his drug use.     2B. How often do you spend more time than you planned using or use more than you planned? (DSM)   Pt reported this scenario takes place quite often.     3. How important is using to your social connections? Do many of your family or friends use?   Pt repoted using it is very important to his social connections.     4A. Are you currently in a significant relationship? no  4B. If yes, how long? n/a  4C. Sexual Orientation: Hetorsexual    5A. Who do you live with? Pt reported being homeless.  5B. Tell me about their alcohol/drug use and mental health issues. N/A.  5C. Are you concerned for your safety there? yes  5D. Are you concerned about the safety of anyone else who lives with you? no    6A. Do you have children who live with you? no  If the person lives with children, ask follow-up questions to determine the person's relationship and  responsibility, both legal and care giving; and what arrangements are made for supervision for the children when the person is not available.    Pt reported all his children are adults.     6B. Do you have children who do not live with you? yes, Explain: All children are adults.  If yes, ask follow up questions to learn where the children are, who has custody and what the person't relaltionship and responsibility is with these children and what hopes the person has for his or her future with these children.    Pt reported he has 4 adult children.     7A. Who supports you in making changes in your alcohol or drug use? What are they willing to do to support you? Who is upset or angry about you making changes in your alcohol or drug use? How big a problem is this for you?   Familt    7B. This table is provided to record information about the person s relationships and available support It is not necessary to ask each item; only to get a comprehensive picture of their support system.  How often can you count on the following people when you need someone?   Partner / Spouse Never supportive   Parent(s)/Aunt(s)/Uncle(s)/Grandparents Always supportive   Sibling(s)/Cousin(s) Always supportive   Child(swati) Always supportive   Other relative(s) Usually supportive   Friend(s)/neighbor(s) Rarely supportive   Child(swati) s father(s)/mother(s) Usually supportive   Support group member(s) Never supportive   Community of zuleima members Never supportive   /counselor/therapist/healer Always supportive   Other (specify) Never supportive     8A. What is your current living situation?     Pt reported being homeless.     8B. What is your long term plan for where you will be living?    Pt wants to get his own housing    8C. Tell me about your living environment/neighborhood? Ask enough follow up questions to determine safety, criminal activity, availability of alcohol and drugs, supportive or antagonistic to the person making  changes.   Patient is homeless and is concerned for his safety in current living environment.     9. Criminal justice history: Gather current/recent history and any significant history related to substance use--Arrests? Convictions? Circumstances? Alcohol or drug involvement? Sentences? Still on probation or parole? Expectations of the court? Current court order? Any sex offenses - lifetime? What level? (DSM)  Pt reported being arrested in the past for forgery with instrument. Pt denied any current criminal justice involvement.     10. What obstacles exist to participating in treatment? (Time off work, childcare, funding, transportation, pending alf time, living situation    None.    Dimension VI Ratings   Recovery environment - The placing authority must use the criteria in Dimension VI to determine a client s recovery environment.   RISK DESCRIPTIONS - Severity ratin Client has (A) Chronically antagonistic significant other, living environment, family, peer gorup or long-term criminal justice involvement that is harmful to recovery or treatment progress, or (B) Client has an actively antagonistic significant other, family, work, or living environment with immediate threat to the client's safety and well-being.     REASONS SEVERITY WAS ASSIGNED - (What support does the person have for making changes? What structure/stability does the person have in his or her daily life that willincrease the likelihood that changes can be sustained? What problems exist in the person s environment that will jeopardize getting/staying clean and sober?) Pt reported being homeless and receives SSI for disability. Pt reported past criminal activities, but denied current involvement. Pt denied past sexual offenses, or violent criminal history. Pt reported he wants to attend a men's residential treatment program.       Client Choice/Exceptions     Would you like services specific to language, age, gender, culture, Mosque  preference, race, ethnicity, sexual orientation or disability? no   If yes, specify:     What particular treatment choices and options would you like to have? Residential MI/CD Program.     Do you have a preference for a particular treatment program? [facility] [place] [place], or [facility].     .    DSM-V Criteria for Substance Abuse  Instructions  Determine whether the client currently meets the criteria for a Substance Use Disorder using the diagnostic criteria in the DSM-V, pp. 481-584. Current means during the most recent 12 months outside a facility that controls access to substances.    Category of substance Severity ICD-10 Code/DSM V Code   [] Alcohol Use Disorder [] Mild  [] Moderate  [] Severe [] (F10.10) (305.00)  [] (F10.20) (303.90)  [] (F10.20) (303.90)   [] Cannabis Use Disorder [] Mild  [] Moderate  [] Severe [] (F12.10) (305.20)  [] (F12.20) (304.30)  [] (F12.20) (304.30)   [] Hallucinogen Use Disorder [] Mild  [] Moderate  [] Severe [] (F16.10) (305.30)  [] (F16.20) (304.50)  [] (F16.20) (304.50)   [] Inhalant Use Disorder [] Mild  [] Moderate  [] Severe [] (F18.10) (305.90)  [] (F18.20) (304.60)  [] (F18.20) (304.60)   [] Opioid Use Disorder [] Mild  [] Moderate  [] Severe [] (F11.10) (305.50)  [] (F11.20) (304.00)  [] (F11.20) (304.00)   [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, Hypnotic, or Anxiolytic Use Disorder [] Mild  [] Moderate  [x] Severe [] (F13.10) (305.40)   [] (F13.20) (304.10)  [] (F13.20) (304.10)   [] Stimulant Related Disorders [x] Mild  [] Moderate  [x] Severe [] (F15.10) (305.70) Amphetamine type substance  [] (F14.10) (305.60) Cocaine  [] (F15.10) (305.70) Other or unspecified stimulant  [] (F15.20) (304.40) Amphetamine type substance  [] (F14.20) (304.20) Cocaine  [] (F15.20) (304.40) Other or unspecified stimulant  [x] (F15.20) (304.40) Amphetamine type substance  [x] (F14.20) (304.20) Cocaine  [] (F15.20) (304.40) Other or unspecified stimulant   [] Tobacco use Disorder []  Mild  [] Moderate  [x] Severe [] (Z72.0) (305.1)  [] (F17.200) (305.1)  [x] (F17.200) (305.1)   [] Other (or unknown) Substance Use Disorder [] Mild  [] Moderate  [] Severe [] (F19.10) (305.90)  [] (F19.20) (304.90)  [] (F19.20) (304.90)     Suggested Level of Care Necessary for Recovery  [] Inpatient [] Extended Care [] Residential [x] Outpatient [] None    Collateral Contact Summary   Number of contacts made:   Contact with referring person: yes    If court related records were reviewed, summarize here: N/A    [] Information from collateral contacts supported/largely agreed with information from the client and associated risk ratings.  [] Information from collateral contacts was significantly different from information from the client and lead to different risk ratings.     Summarize new information here:    Rule 25 Assessment Summary and Plan   's Recommendation    Based on the information gathered in this assessment and from collateral information, the client is being recommended for treatment at [facility] MI/CD Program.   This assessment can be updated with additional information within a 6 month period.    Collateral Contacts     Name    Juli Dougherty Relationship     Phone Number    [phone] Releases    yes      Information Provided: Pt completed a initial Mental Health Social Work Assessment.    Pt requested Rule 25 (Chemical health Assessment) in order to get help with his substance use disorder as well as his mental health.     Collateral Contacts     Name    Jennifer Cruz MD   Relationship    ED Provider Phone Number    [phone] Releases    yes      Information Provided: Via ED Provider Notes    Patient reports suicidal ideation that started today with plan to jump in front of the light rail train. He has had several family members pass away in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

## 2020-09-28 NOTE — PLAN OF CARE
"48 hour assessment    Admission date 9/25/2020 .    Admitting dx:Suicidal thoughts [R45.851]     /83 (BP Location: Left arm)   Pulse 83   Temp 98.5  F (36.9  C) (Oral)   Resp 16   Ht 1.93 m (6' 4\")   Wt 103.6 kg (228 lb 8 oz)   SpO2 100%   BMI 27.81 kg/m       Pt has been withdrawn. Did not attend group. Out of room for meals. Ate breakfast and lunch. BG was 174 and then 113.  Insulin coverage given per order. Refused metformin, otherwise medication complaint. A1C 7.7 today. Goal is to \"go to treatment\" Rule 25 paperwork was emailed in today.  Pt was very dismissive to interview. Denies all mental health symptoms. Denies depression and anxiety. Flat affect, calm mood. Denies psychotic symptoms.  Denies SI and SIB. Denies racing thoughts. Appetite and sleep are \"ok\" 5.5 hours recorded of sleep last night. Denies pain.   "

## 2020-09-28 NOTE — PROGRESS NOTES
"[facility], [place]   Psychiatric Progress Note        Interim History:   From H&P: This patient is a 60 year old -American male with history of below diagnoses who presented to ED via EMS after reporting active suicidal ideation and plan to cut his wrists with a  in context of poor follow through on recommendations received in the past to engage in CD treatment, ongoing cocaine and methamphetamine use, homelessness, unemployment, and medication non-adherence. Urine drug screen positive for amphetamines and cocaine on admission. Patient is interested in residential CD programming at this time.    Team meeting report: The patient's care was discussed with the treatment team during the daily team meeting and/or staff's chart notes were reviewed.  Staff report patient has been calm, pleasant, cooperative. Patient is attending groups and participating appropriately. Patient is eating well and taking medications as prescribed.  In the morning, he denied depression and anxiety,  in the evening, rated both as moderate.  He slept all night.    Met with patient.  He is very pleasant.  He is in bed.  He feels tired.  He slept well.  Appetite is intact.  No suicidal thoughts today.  Depression is \"better\".  Denies anxiety.  The patient wants to go to CD treatment.  I will 25 was at the bedside.  The patient was encouraged to finish the paperwork today.  No questions or concerns.         Medications:       buPROPion  100 mg Oral BID     gabapentin  400 mg Oral TID     metFORMIN  1,000 mg Oral BID w/meals          Allergies:   No Known Allergies       Labs:     Recent Results (from the past 672 hour(s))   Drug abuse screen urine    Collection Time: 09/03/20  1:11 AM   Result Value Ref Range    Amphetamine Qual Urine Negative NEG^Negative    Barbiturates Qual Urine Negative NEG^Negative    Benzodiazepine Qual Urine Negative NEG^Negative    Cannabinoids Qual Urine Negative NEG^Negative "    Cocaine Qual Urine Positive (A) NEG^Negative    Opiates Qualitative Urine Negative NEG^Negative    PCP Qual Urine Negative NEG^Negative   Alcohol breath test POCT    Collection Time: 09/25/20  3:22 AM   Result Value Ref Range    Alcohol Breath Test 0.000 0.00 - 0.01   Asymptomatic COVID-19 Virus (Coronavirus) by PCR    Collection Time: 09/25/20  3:03 PM    Specimen: Nasopharyngeal   Result Value Ref Range    COVID-19 Virus PCR to [facility] - Source Nasopharyngeal     COVID-19 Virus PCR to [facility] - Result       Test received-See reflex to IDDL test SARS CoV2 (COVID-19) Virus RT-PCR   SARS-CoV-2 COVID-19 Virus (Coronavirus) RT-PCR Nasopharyngeal    Collection Time: 09/25/20  3:03 PM    Specimen: Nasopharyngeal   Result Value Ref Range    SARS-CoV-2 Virus Specimen Source Nasopharyngeal     SARS-CoV-2 PCR Result NEGATIVE     SARS-CoV-2 PCR Comment       Testing was performed using the Simplexa COVID-19 Direct Assay on the [facility] MDX   instrument. Additional information about this Emergency Use Authorization (EUA) assay can   be found via the Lab Guide.     Drug abuse screen 6 urine (chem dep)    Collection Time: 09/25/20  4:43 PM   Result Value Ref Range    Amphetamine Qual Urine Positive (A) NEG^Negative    Barbiturates Qual Urine Negative NEG^Negative    Benzodiazepine Qual Urine Negative NEG^Negative    Cannabinoids Qual Urine Negative NEG^Negative    Cocaine Qual Urine Positive (A) NEG^Negative    Ethanol Qual Urine Negative NEG^Negative    Opiates Qualitative Urine Negative NEG^Negative   Glucose by meter    Collection Time: 09/25/20  6:03 PM   Result Value Ref Range    Glucose 255 (H) 70 - 99 mg/dL   Glucose by meter    Collection Time: 09/26/20  7:59 AM   Result Value Ref Range    Glucose 182 (H) 70 - 99 mg/dL   Glucose by meter    Collection Time: 09/26/20 11:28 AM   Result Value Ref Range    Glucose 136 (H) 70 - 99 mg/dL   Glucose by meter    Collection Time: 09/26/20  3:52 PM   Result Value Ref  Range    Glucose 197 (H) 70 - 99 mg/dL   Glucose by meter    Collection Time: 09/27/20  8:03 AM   Result Value Ref Range    Glucose 258 (H) 70 - 99 mg/dL   Glucose by meter    Collection Time: 09/27/20 12:12 PM   Result Value Ref Range    Glucose 151 (H) 70 - 99 mg/dL   Glucose by meter    Collection Time: 09/27/20  4:30 PM   Result Value Ref Range    Glucose 261 (H) 70 - 99 mg/dL            Psychiatric Examination:   Temp: 98.9  F (37.2  C) Temp src: Oral BP: 131/83 Pulse: 83   Resp: 16        Weight is 228 lbs 8 oz  Body mass index is 27.81 kg/m .    Appearance: well groomed, awake, alert, cooperative, no apparent distress and mildly obese  Attitude:  cooperative  Eye Contact:  good  Mood:  depressed and better  Affect:  mood congruent  Speech:  clear, coherent  Psychomotor Behavior:  no evidence of tardive dyskinesia, dystonia, or tics  Throught Process:  logical and goal oriented  Associations:  no loose associations  Thought Content:  no evidence of suicidal ideation or homicidal ideation  Insight:  good  Judgement:  intact  Oriented to:  time, person, and place  Attention Span and Concentration:  intact  Recent and Remote Memory:  intact         Precautions:     Behavioral Orders   Procedures     Assault precautions     Code 1 - Restrict to Unit     Routine Programming     As clinically indicated     Single Room     Status 15     Every 15 minutes.          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   Type II Diabetes         Plan:   Restart PTA medications, including:  - Wellbutrin 100 mg BID  - Gabapentin 400 mg TID  -PRN medications include trazodone, hydroxyzine, and Zyprexa.  -Nicotine replacement available    -Blood work was reviewed.  -Internal medicine follow-up for  medical problems.    Patient is willing to complete CD assessment.     Disposition Plan   Reason for ongoing admission: is unable to care for self due to depression, anxiety, suicidal ideation, in the context of abusing drugs  Disposition: TBD  Estimated length of stay: 5 to 7 days  Legal Status: Voluntary  Discharge will be granted once symptoms improved.    Melita ROBERTSON CNP  Date: 09/28/20  Time: 1:21 PM        More than 30 minutes were spent for assessment, documentation, and coordination of care.     This note was created with the help of Dragon dictation system. All grammatical/typing errors or context distortion are unintentional and inherent to software.

## 2020-09-28 NOTE — CONSULTS
9/28/2020    CD consult acknowledged.   Please provide pt CD paperwork to complete (in pen or marker please as pencil does not scan well).   Email completed paperwork to:  [email or web address]  [email or web address]    Pt will be seen within 24 hours of receiving paperwork.    [email or web address]  [phone]
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
## 2020-09-29 LAB
GLUCOSE BLDC GLUCOMTR-MCNC: 104 MG/DL (ref 70–99)
GLUCOSE BLDC GLUCOMTR-MCNC: 245 MG/DL (ref 70–99)
GLUCOSE BLDC GLUCOMTR-MCNC: 259 MG/DL (ref 70–99)
GLUCOSE BLDC GLUCOMTR-MCNC: 302 MG/DL (ref 70–99)
GLUCOSE BLDC GLUCOMTR-MCNC: 327 MG/DL (ref 70–99)

## 2020-09-29 PROCEDURE — 25000132 ZZH RX MED GY IP 250 OP 250 PS 637: Performed by: PSYCHIATRY & NEUROLOGY

## 2020-09-29 PROCEDURE — 99233 SBSQ HOSP IP/OBS HIGH 50: CPT | Performed by: NURSE PRACTITIONER

## 2020-09-29 PROCEDURE — G0177 OPPS/PHP; TRAIN & EDUC SERV: HCPCS

## 2020-09-29 PROCEDURE — 12400001 ZZH R&B MH UMMC

## 2020-09-29 PROCEDURE — 00000146 ZZHCL STATISTIC GLUCOSE BY METER IP

## 2020-09-29 RX ADMIN — GABAPENTIN 400 MG: 400 CAPSULE ORAL at 08:28

## 2020-09-29 RX ADMIN — GABAPENTIN 400 MG: 400 CAPSULE ORAL at 20:42

## 2020-09-29 RX ADMIN — INSULIN ASPART 11 UNITS: 100 INJECTION, SOLUTION INTRAVENOUS; SUBCUTANEOUS at 08:28

## 2020-09-29 RX ADMIN — BUPROPION HYDROCHLORIDE 100 MG: 100 TABLET, EXTENDED RELEASE ORAL at 20:42

## 2020-09-29 RX ADMIN — INSULIN GLARGINE 20 UNITS: 100 INJECTION, SOLUTION SUBCUTANEOUS at 08:28

## 2020-09-29 RX ADMIN — BUPROPION HYDROCHLORIDE 100 MG: 100 TABLET, EXTENDED RELEASE ORAL at 08:27

## 2020-09-29 RX ADMIN — GABAPENTIN 400 MG: 400 CAPSULE ORAL at 13:07

## 2020-09-29 ASSESSMENT — ACTIVITIES OF DAILY LIVING (ADL)
ORAL_HYGIENE: INDEPENDENT
HYGIENE/GROOMING: INDEPENDENT
DRESS: INDEPENDENT
LAUNDRY: WITH SUPERVISION

## 2020-09-29 ASSESSMENT — MIFFLIN-ST. JEOR: SCORE: 1971.12

## 2020-09-29 NOTE — PROGRESS NOTES
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  Diabetes consult 9/28 for alternatives to metformin management    Fasting glucose in target, but post-prandials elevated to 200s in the evening.    -glargine 20 units in AM  - aspart 1 unit per 14 grams carb for meals AND snacks--> increased to 1 per 12 grams  - aspart medium resistance qAC, HS  - metformin order discontinued   - test claim request to pharmacy liaison    Outpatient diabetes follow up: TBD  Plan discussed with patient, nursing           Interval History:     The last 24 hours progress and nursing notes reviewed.    Pt reported being happy with insulin treatment yesterday.  RN who had him yesterday said he was non engaged in any dosing discussion yesterday.    Today, Discussed insulin storage requirements.  Pt will not have reliable access to refrigeration.    Alternatives to insulin that are not metformin: discussed sulfonylurea, GLP-1 RA.  Brody says generally he likes idea  Of injection medication over pill.  Denies family hx thyroid c cell cancer, denies pancreatitis.      Storage for GLP-1 Mariusz. . .  Trulicity-  Store your pen in the refrigerator, but do NOT freeze your pen.  If you decide to travel with your Trulicity pens, you can keep them at room temperature  "(below 86 F, 30 C) for up to 14 days.    Victoza-  Your new, unused Victoza  pen should be stored in the refrigerator at 36 F to 46 F (2 C to 8 C). If Victoza  is stored outside of the refrigerator (by mistake) prior to first use, it should be used or thrown away within 30 days. Victoza  should not be frozen.    After first use, store your Victoza  pen for up to 30 days at 59 F to 86 F (15 C to 30 C) or in a refrigerator at 36 F to 46 F (2 C to 8 C).          Brody reports eating a snack around 1600 and 2100.  No aspart given.  BGs to 200s after those uncovered snacks.      Recent Labs   Lab 09/29/20  0800 09/28/20  2212 09/28/20  1712 09/28/20  1226 09/28/20  0808 09/28/20  0725 09/27/20  1630   GLC  --   --   --   --   --  134*  --    * 247* 224* 113* 174*  --  261*       Patient is interested in residential CD programming, Rule 25 completed yesterday, no placement secured yet.  Anticipated length of stay: 7 days        Nutrition:     Orders Placed This Encounter      Regular Diet Adult            PTA Regimen:     Prescribed Metformin, but not taking x one month - does not tolerate (GI side effects)  No PTA regimen          Review of Systems:   See interval hx          Medications:   No steroid         Physical Exam:     Gen: Alert, in NAD   HEENT: NC/AT hearing intact to conversational volume  Resp: Unlabored  Neuro: oriented x3, communicating clearly  Psych: calm, even mood.  Engaged more end of conversation  /83 (BP Location: Left arm)   Pulse 83   Temp 98.4  F (36.9  C) (Oral)   Resp 16   Ht 1.93 m (6' 4\")   Wt 105 kg (231 lb 6.4 oz)   SpO2 99%   BMI 28.17 kg/m               Data:     Lab Results   Component Value Date    A1C 7.7 09/28/2020    A1C 8.2 04/06/2019              CBC RESULTS:   Recent Labs   Lab Test 09/28/20  0725   WBC 5.5   RBC 4.55   HGB 14.1   HCT 40.2   MCV 88   MCH 31.0   MCHC 35.1   RDW 12.7        Recent Labs   Lab Test 09/28/20  0725 07/26/20  0158    134 "   POTASSIUM 4.2 4.6   CHLORIDE 108 103   CO2 27 25   ANIONGAP 4 6   * 364*   BUN 12 22   CR 0.96 1.24   DAYANA 8.6 9.5     Liver Function Studies -   Recent Labs   Lab Test 09/28/20  0725   PROTTOTAL 7.3   ALBUMIN 3.2*   BILITOTAL 0.4   ALKPHOS 59   AST 12   ALT 16     No results found for: INR        I spent a total of 35 minutes bedside and on the inpatient unit managing the glycemic care of Brody Newtontimmy Colindres. Over 50% of my time on the unit was spent counseling the patient  and/or coordinating care regarding acute diabetes management, planning for safe discharge regimen given limited resources.  See note for details.      Jeny John APRN [id number]  Diabetes Management job code [id number]

## 2020-09-29 NOTE — PROGRESS NOTES
[facility], [place]   Psychiatric Progress Note        Interim History:   From H&P: This patient is a 60 year old -American male with history of below diagnoses who presented to ED via EMS after reporting active suicidal ideation and plan to cut his wrists with a  in context of poor follow through on recommendations received in the past to engage in CD treatment, ongoing cocaine and methamphetamine use, homelessness, unemployment, and medication non-adherence. Urine drug screen positive for amphetamines and cocaine on admission. Patient is interested in residential CD programming at this time.    Team meeting report: The patient's care was discussed with the treatment team during the daily team meeting and/or staff's chart notes were reviewed.  Staff report patient has been calm, pleasant, cooperative. Patient is attending groups and participating appropriately. Patient is eating well and taking medications as prescribed.  He was in bed most of the morning.  In the evening, he was in the lounge watching TV with peers.  Did not attend groups.  He completed rule 25.  He refused to take metformin and insulin, believing that he does not need it.  Denied mental health symptoms.  Slept 5-1/2 hours.    Met with patient.  Patient is in bed sleeping.  States he is not sleeping well at night.  He is usually a morning person but now he feels tired.  He does not know if this is related to withdrawing from cocaine.  His mood is much better.  He is not suicidal.  He is not hearing voices or seeing things other people do not, denies paranoia.  Is very pleasant and cooperative.  No questions or concerns.         Medications:       buPROPion  100 mg Oral BID     gabapentin  400 mg Oral TID     insulin aspart   Subcutaneous TID w/meals     insulin aspart  1-7 Units Subcutaneous TID AC     insulin aspart  1-5 Units Subcutaneous At Bedtime     insulin glargine  20 Units Subcutaneous QAM AC      metFORMIN  1,000 mg Oral BID w/meals          Allergies:   No Known Allergies       Labs:     Recent Results (from the past 672 hour(s))   Drug abuse screen urine    Collection Time: 09/03/20  1:11 AM   Result Value Ref Range    Amphetamine Qual Urine Negative NEG^Negative    Barbiturates Qual Urine Negative NEG^Negative    Benzodiazepine Qual Urine Negative NEG^Negative    Cannabinoids Qual Urine Negative NEG^Negative    Cocaine Qual Urine Positive (A) NEG^Negative    Opiates Qualitative Urine Negative NEG^Negative    PCP Qual Urine Negative NEG^Negative   Alcohol breath test POCT    Collection Time: 09/25/20  3:22 AM   Result Value Ref Range    Alcohol Breath Test 0.000 0.00 - 0.01   Asymptomatic COVID-19 Virus (Coronavirus) by PCR    Collection Time: 09/25/20  3:03 PM    Specimen: Nasopharyngeal   Result Value Ref Range    COVID-19 Virus PCR to [facility] - Source Nasopharyngeal     COVID-19 Virus PCR to [facility] - Result       Test received-See reflex to IDDL test SARS CoV2 (COVID-19) Virus RT-PCR   SARS-CoV-2 COVID-19 Virus (Coronavirus) RT-PCR Nasopharyngeal    Collection Time: 09/25/20  3:03 PM    Specimen: Nasopharyngeal   Result Value Ref Range    SARS-CoV-2 Virus Specimen Source Nasopharyngeal     SARS-CoV-2 PCR Result NEGATIVE     SARS-CoV-2 PCR Comment       Testing was performed using the Simplexa COVID-19 Direct Assay on the [employer] Liaison MDX   instrument. Additional information about this Emergency Use Authorization (EUA) assay can   be found via the Lab Guide.     Drug abuse screen 6 urine (chem dep)    Collection Time: 09/25/20  4:43 PM   Result Value Ref Range    Amphetamine Qual Urine Positive (A) NEG^Negative    Barbiturates Qual Urine Negative NEG^Negative    Benzodiazepine Qual Urine Negative NEG^Negative    Cannabinoids Qual Urine Negative NEG^Negative    Cocaine Qual Urine Positive (A) NEG^Negative    Ethanol Qual Urine Negative NEG^Negative    Opiates Qualitative Urine Negative  NEG^Negative   Glucose by meter    Collection Time: 09/25/20  6:03 PM   Result Value Ref Range    Glucose 255 (H) 70 - 99 mg/dL   Glucose by meter    Collection Time: 09/26/20  7:59 AM   Result Value Ref Range    Glucose 182 (H) 70 - 99 mg/dL   Glucose by meter    Collection Time: 09/26/20 11:28 AM   Result Value Ref Range    Glucose 136 (H) 70 - 99 mg/dL   Glucose by meter    Collection Time: 09/26/20  3:52 PM   Result Value Ref Range    Glucose 197 (H) 70 - 99 mg/dL   Glucose by meter    Collection Time: 09/27/20  8:03 AM   Result Value Ref Range    Glucose 258 (H) 70 - 99 mg/dL   Glucose by meter    Collection Time: 09/27/20 12:12 PM   Result Value Ref Range    Glucose 151 (H) 70 - 99 mg/dL   Glucose by meter    Collection Time: 09/27/20  4:30 PM   Result Value Ref Range    Glucose 261 (H) 70 - 99 mg/dL   Lipid Profile    Collection Time: 09/28/20  7:25 AM   Result Value Ref Range    Cholesterol 133 <200 mg/dL    Triglycerides 95 <150 mg/dL    HDL Cholesterol 54 >39 mg/dL    LDL Cholesterol Calculated 60 <100 mg/dL    Non HDL Cholesterol 79 <130 mg/dL   CBC with platelets differential    Collection Time: 09/28/20  7:25 AM   Result Value Ref Range    WBC 5.5 4.0 - 11.0 10e9/L    RBC Count 4.55 4.4 - 5.9 10e12/L    Hemoglobin 14.1 13.3 - 17.7 g/dL    Hematocrit 40.2 40.0 - 53.0 %    MCV 88 78 - 100 fl    MCH 31.0 26.5 - 33.0 pg    MCHC 35.1 31.5 - 36.5 g/dL    RDW 12.7 10.0 - 15.0 %    Platelet Count 223 150 - 450 10e9/L    Diff Method Automated Method     % Neutrophils 44.3 %    % Lymphocytes 39.9 %    % Monocytes 9.8 %    % Eosinophils 4.9 %    % Basophils 0.7 %    % Immature Granulocytes 0.4 %    Nucleated RBCs 0 0 /100    Absolute Neutrophil 2.4 1.6 - 8.3 10e9/L    Absolute Lymphocytes 2.2 0.8 - 5.3 10e9/L    Absolute Monocytes 0.5 0.0 - 1.3 10e9/L    Absolute Eosinophils 0.3 0.0 - 0.7 10e9/L    Absolute Basophils 0.0 0.0 - 0.2 10e9/L    Abs Immature Granulocytes 0.0 0 - 0.4 10e9/L    Absolute Nucleated RBC 0.0     Comprehensive metabolic panel    Collection Time: 09/28/20  7:25 AM   Result Value Ref Range    Sodium 139 133 - 144 mmol/L    Potassium 4.2 3.4 - 5.3 mmol/L    Chloride 108 94 - 109 mmol/L    Carbon Dioxide 27 20 - 32 mmol/L    Anion Gap 4 3 - 14 mmol/L    Glucose 134 (H) 70 - 99 mg/dL    Urea Nitrogen 12 7 - 30 mg/dL    Creatinine 0.96 0.66 - 1.25 mg/dL    GFR Estimate 87 >60 mL/min/[1.73_m2]    GFR Estimate If Black >90 >60 mL/min/[1.73_m2]    Calcium 8.6 8.5 - 10.1 mg/dL    Bilirubin Total 0.4 0.2 - 1.3 mg/dL    Albumin 3.2 (L) 3.4 - 5.0 g/dL    Protein Total 7.3 6.8 - 8.8 g/dL    Alkaline Phosphatase 59 40 - 150 U/L    ALT 16 0 - 70 U/L    AST 12 0 - 45 U/L   TSH with free T4 reflex    Collection Time: 09/28/20  7:25 AM   Result Value Ref Range    TSH 0.64 0.40 - 4.00 mU/L   Hemoglobin A1c    Collection Time: 09/28/20  7:25 AM   Result Value Ref Range    Hemoglobin A1C 7.7 (H) 0 - 5.6 %   Glucose by meter    Collection Time: 09/28/20  8:08 AM   Result Value Ref Range    Glucose 174 (H) 70 - 99 mg/dL   Glucose by meter    Collection Time: 09/28/20 12:26 PM   Result Value Ref Range    Glucose 113 (H) 70 - 99 mg/dL   Glucose by meter    Collection Time: 09/28/20  5:12 PM   Result Value Ref Range    Glucose 224 (H) 70 - 99 mg/dL   Glucose by meter    Collection Time: 09/28/20 10:12 PM   Result Value Ref Range    Glucose 247 (H) 70 - 99 mg/dL            Psychiatric Examination:   Temp: 98.7  F (37.1  C) Temp src: Oral       Resp: 16 SpO2: 99 %      Weight is 228 lbs 8 oz  Body mass index is 27.81 kg/m .    Appearance: well groomed, awake, alert, cooperative, no apparent distress and mildly obese  Attitude:  cooperative  Eye Contact:  good  Mood:  depressed and better  Affect:  mood congruent  Speech:  clear, coherent  Psychomotor Behavior:  no evidence of tardive dyskinesia, dystonia, or tics  Throught Process:  logical and goal oriented  Associations:  no loose associations  Thought Content:  no evidence of  suicidal ideation or homicidal ideation  Insight:  good  Judgement:  intact  Oriented to:  time, person, and place  Attention Span and Concentration:  intact  Recent and Remote Memory:  intact         Precautions:     Behavioral Orders   Procedures     Assault precautions     Code 1 - Restrict to Unit     Routine Programming     As clinically indicated     Single Room     Status 15     Every 15 minutes.          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   Type II Diabetes         Plan:   Restart PTA medications, including:  - Wellbutrin 100 mg BID  - Gabapentin 400 mg TID  -PRN medications include trazodone, hydroxyzine, and Zyprexa.  -Nicotine replacement available    -Blood work was reviewed.  -Internal medicine follow-up for medical problems.    Patient is willing to complete CD assessment.     Disposition Plan   Reason for ongoing admission: is unable to care for self due to depression, anxiety, suicidal ideation, in the context of abusing drugs  Disposition: TBD  Estimated length of stay: 5 to 7 days  Legal Status: Voluntary  Discharge will be granted once symptoms improved.    Melita ROBERTSON CNP  Date: 09/29/20  Time: 12:35 PM      More than 30 minutes were spent for assessment, documentation, and coordination of care.     This note was created with the help of Dragon dictation system. All grammatical/typing errors or context distortion are unintentional and inherent to software.

## 2020-09-29 NOTE — PROGRESS NOTES
Behavioral Health  Note   Behavioral Health  Spirituality Group Note     Unit 10NB    Name: Brody Colindres    YOB: 1960   MRN: [id number]    Age: 60 year old     Patient attended -led group, which included discussion of spirituality, coping with illness and building resilience.   Patient attended group for 1 hrs.   The patient actively participated in group discussion and patient demonstrated an appreciation of topic's application for their personal circumstances.     Nilesh Garsia, [facility], DMin  Staff    Pager [phone]

## 2020-09-29 NOTE — PROGRESS NOTES
Work Completed: SB (writer) met with trx team, provided update, and reviewed pt's chart. CTC completed care plan team note. CTC met with pt to introduce themselves and check in with him. Pt declined having any questions at this time and confirmed that he completed the Rule 25 via phone with  previously. CTC called [facility] to see if they had received the referral yet, which they hadn't, and CTC will call again tomorrow morning.     Discharge plan or goal: Pt will discharge to a CD trx program once stabilized and a bed becomes available.                Barriers to discharge: Symptom severity, safe discharge plan, and medication evaluation.

## 2020-09-29 NOTE — PROGRESS NOTES
"Patient visible on the unit; spent most of the evening watching television and interacted appropriately with staff and select peers. He presented with a flat, blunted affect but was calm and cooperative. Denied all mental health symptoms; has insight into his situation and \"hopes to go to a chemical dependency treatment center\" when discharged. He took most of his medications but declined Metformin; blood glucose at dinner time was 224 and 247 at HS; given coverage at dinner time but refused coverage at HS because, according to him, he's \"going to bed and good\" so he doesn't need coverage. Ate dinner and had no behavioral or other concerns.  "

## 2020-09-29 NOTE — CONSULTS
Pt participated in Rule 25 assessment via telephone visit on 9/28/20.  Pt meets criteria for co-occurring residential treatment. He would like to go to a smaller program but does not have any other preferences.      contacted [facility] - admissions dept confirmed that they are accepting referrals right now and expect to have 2 openings by the end of the week.     will send R25 to [facility] and [facility] Clinical Review Team this afternoon.   Potential barrier: Inconsistent medication compliance, especially with medications used to manage diabetes.    Referral:  [address] [address]  Phone: [phone]  Fax: [phone]    [email or web address]  [phone]

## 2020-09-29 NOTE — PROGRESS NOTES
Patient administered insulin independently. He reported to the writer that he did not feel the needle stick while adminisitering insulin hence did not think he administered any insulin. Writer contacted IM and the doctor advised this writer to check patient's BG and administer an additional unit based on sliding scale. Patient's BG rechecked and was 259, hence he received 3 units of insulin Novolog per sliding scale. Staff will continue to monitor.

## 2020-09-29 NOTE — PLAN OF CARE
BEHAVIORAL TEAM DISCUSSION    Participants: Melita Dumont NP, Myrna Murrell RN, Tate Costa LPC  Progress: New Admit  Anticipated length of stay: 7 days  Continued Stay Criteria/Rationale: Evaluation and assessment  Medical/Physical: None reported in team  Precautions:   Behavioral Orders   Procedures    Assault precautions    Code 1 - Restrict to Unit    Routine Programming     As clinically indicated    Single Room    Status 15     Every 15 minutes.     Plan: Continue evaluation and assessment for stabilization and aftercare needs.  Rationale for change in precautions or plan: None

## 2020-09-29 NOTE — PROGRESS NOTES
09/29/20 1400   Behavioral Health   Hallucinations denies / not responding to hallucinations   Thinking intact   Orientation person: oriented;place: oriented   Memory baseline memory   Insight insight appropriate to situation;insight appropriate to events   Judgement intact   Eye Contact at examiner   Affect full range affect   Mood mood is calm   Physical Appearance/Attire appears stated age;attire appropriate to age and situation   Hygiene well groomed   Suicidality other (see comments)  (denies)   1. Wish to be Dead (Recent) No   Self Injury other (see comment)  (denies)   Activity other (see comment)  (social, active and visible )   Speech clear;coherent   Medication Sensitivity no stated side effects;no observed side effects   Psychomotor / Gait balanced;steady     Patient is calm and cooperative. He has been in the lounge watching tv most of the day. He is social with others but has a flat affect with staff. He denies depression, anxiety, SI/SIB. Good concentration. Independent with his ADLs. He has been eating and sleeping well and denies any pain. He has no other concerns.

## 2020-09-30 LAB
GLUCOSE BLDC GLUCOMTR-MCNC: 108 MG/DL (ref 70–99)
GLUCOSE BLDC GLUCOMTR-MCNC: 134 MG/DL (ref 70–99)
GLUCOSE BLDC GLUCOMTR-MCNC: 147 MG/DL (ref 70–99)
GLUCOSE BLDC GLUCOMTR-MCNC: 175 MG/DL (ref 70–99)
GLUCOSE BLDC GLUCOMTR-MCNC: 233 MG/DL (ref 70–99)

## 2020-09-30 PROCEDURE — 00000146 ZZHCL STATISTIC GLUCOSE BY METER IP

## 2020-09-30 PROCEDURE — 25000125 ZZHC RX 250: Performed by: CLINICAL NURSE SPECIALIST

## 2020-09-30 PROCEDURE — 99233 SBSQ HOSP IP/OBS HIGH 50: CPT | Performed by: NURSE PRACTITIONER

## 2020-09-30 PROCEDURE — 25000132 ZZH RX MED GY IP 250 OP 250 PS 637: Performed by: PSYCHIATRY & NEUROLOGY

## 2020-09-30 PROCEDURE — 12400001 ZZH R&B MH UMMC

## 2020-09-30 RX ORDER — LIRAGLUTIDE 6 MG/ML
0.6 INJECTION SUBCUTANEOUS
Qty: 3 ML | Refills: 1 | Status: ON HOLD | OUTPATIENT
Start: 2020-09-30 | End: 2020-10-15

## 2020-09-30 RX ORDER — GABAPENTIN 400 MG/1
400 CAPSULE ORAL 3 TIMES DAILY
Qty: 90 CAPSULE | Refills: 0 | Status: ON HOLD | OUTPATIENT
Start: 2020-09-30 | End: 2020-11-18

## 2020-09-30 RX ORDER — LANCETS
EACH MISCELLANEOUS
Qty: 120 EACH | Refills: 6 | Status: SHIPPED | OUTPATIENT
Start: 2020-09-30

## 2020-09-30 RX ORDER — GLUCOSAMINE HCL/CHONDROITIN SU 500-400 MG
CAPSULE ORAL
Qty: 100 EACH | Refills: 3 | Status: SHIPPED | OUTPATIENT
Start: 2020-09-30

## 2020-09-30 RX ORDER — BUPROPION HYDROCHLORIDE 100 MG/1
100 TABLET, EXTENDED RELEASE ORAL 2 TIMES DAILY
Qty: 60 TABLET | Refills: 0 | Status: ON HOLD | OUTPATIENT
Start: 2020-09-30 | End: 2020-10-15

## 2020-09-30 RX ORDER — LIRAGLUTIDE 6 MG/ML
0.6 INJECTION SUBCUTANEOUS
Status: DISCONTINUED | OUTPATIENT
Start: 2020-09-30 | End: 2020-10-01 | Stop reason: HOSPADM

## 2020-09-30 RX ADMIN — GABAPENTIN 400 MG: 400 CAPSULE ORAL at 20:16

## 2020-09-30 RX ADMIN — GABAPENTIN 400 MG: 400 CAPSULE ORAL at 08:32

## 2020-09-30 RX ADMIN — BUPROPION HYDROCHLORIDE 100 MG: 100 TABLET, EXTENDED RELEASE ORAL at 08:32

## 2020-09-30 RX ADMIN — LIRAGLUTIDE 0.6 MG: 6 INJECTION SUBCUTANEOUS at 18:39

## 2020-09-30 RX ADMIN — BUPROPION HYDROCHLORIDE 100 MG: 100 TABLET, EXTENDED RELEASE ORAL at 20:16

## 2020-09-30 RX ADMIN — INSULIN GLARGINE 20 UNITS: 100 INJECTION, SOLUTION SUBCUTANEOUS at 08:36

## 2020-09-30 RX ADMIN — GABAPENTIN 400 MG: 400 CAPSULE ORAL at 13:00

## 2020-09-30 ASSESSMENT — ACTIVITIES OF DAILY LIVING (ADL)
LAUNDRY: WITH SUPERVISION
ORAL_HYGIENE: INDEPENDENT
HYGIENE/GROOMING: INDEPENDENT
DRESS: SCRUBS (BEHAVIORAL HEALTH)

## 2020-09-30 NOTE — PROGRESS NOTES
Work Completed: [facility] (writer) met with trx team, provided update, and reviewed chart. [facility] confirmed with attendign during trx team that pt was ready for discharge. [facility] contact [facility] confirming that they received the referral [place]. Pt was reviewed and [facility] was contacted being told that pt could be admitted tomorrow. [facility] arranged for pt to be admitted at 11:00 and [facility] will be sending an [employer] medical ride to pick pt up. [facility] contact agreed to reach out to [facility] tomorrow morning after the ride is set up to confirm it happened. [facility] met with pt and provided this update. Pt expressed gratitude and was very happy to hear he would be discharge to CD trx tomorrow. AVS was updated.     Discharge plan or goal: Pt will discharge to a CD trx program once stabilized and a bed becomes available.                 Barriers to discharge: Symptom severity, safe discharge plan, and medication evaluation.

## 2020-09-30 NOTE — PROGRESS NOTES
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  Diabetes consult 9/28 for alternatives to metformin management      Post-prandial hyperglycemia, again in setting of omitted snack coverage.    -glargine 20 units in AM today, then reduce tomorrow- planning for 15 units  - aspart 1 unit per 12 grams carb for meals AND snacks--> increased to 1 per 10 grams for bfast and lunch-- now doscontinued with liraglutide start  - aspart medium resistance qAC, HS  - metformin order discontinued   - test claim request to pharmacy liaison--> Victoza 3.00 copay  - start liraglutide 0.6 mg with supper tonight  - diabetes education consult    Outpatient diabetes follow up: PCP  Plan discussed with patient, nursing, internal medicine    Tentative plan for discharge:    Victoza 0.6 mg daily   Lantus 15 units daily  Check glucose two times per day, before meals  - needs glucometer/supplies as well as injectables, pen needles, alcohol wipes, sharps container        Future prescriptions may need to be one pen at a time if pt doesn't have access to refrigerated storage.           Interval History:     The last 24 hours progress and nursing notes reviewed.  Possible discharge tomorrow.  No confirmed placement, yet.  Brody is doing okay.  Eating  well.      After treatment program, patient does not expect to have reliable access to refrigeration.    Alternatives to insulin that are not metformin: discussed sulfonylurea, GLP-1 RA.  Brody says generally he likes idea of injection medication over pill.  Denies family hx thyroid c cell cancer, MEN2, denies pancreatitis.      Storage for GLP-1 Mariusz. . .  Victoza-  Your new, unused Victoza  pen should be stored in the refrigerator at 36 F to 46 F (2 C to 8 C). If Victoza  is stored outside of the refrigerator (by mistake) prior to first use, it should be used or thrown away within 30 days. Victoza  should not be frozen.    After first use, store your Victoza  pen for up to 30 days at 59 F to 86 F (15 C to 30 C) or in a refrigerator at 36 F to 46 F (2 C to 8 C).    Brody says it will work for him to  Victoza once pen at a time, monthly, which would work for starting dose.  At increased dose to 1.2 mg, he'd need to go twice monthly.      Reviewed Victoza action and possible GI side effects.  Will need to provide patient hand out also -- will paste into AVS.          Recent Labs   Lab 09/30/20  1208 09/30/20  0759 09/30/20  0223 09/29/20  2135 09/29/20  1717 09/29/20  1327  09/28/20  0725   GLC  --   --   --   --   --   --   --  134*   * 147* 175* 302* 327* 259*   < >  --     < > = values in this interval not displayed.       Patient is interested in residential CD programming, Rule 25 completed yesterday, no placement secured yet.  Anticipated length of stay: 7 days        Nutrition:     Orders Placed This Encounter      Regular Diet Adult            PTA Regimen:     Prescribed Metformin, but not taking x one month - does not tolerate (GI side effects)  No PTA regimen          Review of Systems:   See interval hx          Medications:   No steroid         Physical Exam:     Gen: Alert, in NAD   HEENT: NC/AT hearing intact to conversational volume  Resp: Unlabored  Neuro: oriented x3, communicating  "clearly  Psych: calm, even mood.  Engaged more end of conversation  /74   Pulse 81   Temp 98.4  F (36.9  C) (Oral)   Resp 18   Ht 1.93 m (6' 4\")   Wt 105 kg (231 lb 6.4 oz)   SpO2 99%   BMI 28.17 kg/m               Data:     Lab Results   Component Value Date    A1C 7.7 09/28/2020    A1C 8.2 04/06/2019              CBC RESULTS:   Recent Labs   Lab Test 09/28/20  0725   WBC 5.5   RBC 4.55   HGB 14.1   HCT 40.2   MCV 88   MCH 31.0   MCHC 35.1   RDW 12.7        Recent Labs   Lab Test 09/28/20  0725 07/26/20  0158    134   POTASSIUM 4.2 4.6   CHLORIDE 108 103   CO2 27 25   ANIONGAP 4 6   * 364*   BUN 12 22   CR 0.96 1.24   DAYANA 8.6 9.5     Liver Function Studies -   Recent Labs   Lab Test 09/28/20  0725   PROTTOTAL 7.3   ALBUMIN 3.2*   BILITOTAL 0.4   ALKPHOS 59   AST 12   ALT 16     No results found for: INR        I spent a total of 35 minutes bedside and on the inpatient unit managing the glycemic care of Brody Colindres. Over 50% of my time on the unit was spent counseling the patient  and/or coordinating care regarding transition from MDI to alternative therapy for discharge.  See note for details.      Jeny Lopez APRN [facility] [phone]  Diabetes Management job code [id number]                "

## 2020-09-30 NOTE — PROGRESS NOTES
IM was notified of patient's possible discharge as early as tomorrow so they can order his insulins.

## 2020-09-30 NOTE — PROGRESS NOTES
"    Brief Medicine Note    Contacted by nursing regarding pt potentially to discharge tomorrow and will need diabetes medications x 1 month.     Today's vital signs, medications, and nursing notes were reviewed.    /74   Pulse 81   Temp 98.4  F (36.9  C) (Oral)   Resp 18   Ht 1.93 m (6' 4\")   Wt 105 kg (231 lb 6.4 oz)   SpO2 99%   BMI 28.17 kg/m        A/P:  IDDM  Appreciate endocrinology input, they are looking into whether pt will go on Victoza   will follow up with endocrinology recs    Laurence Serrano PA-C  Hospitalist Service  Pager: [phone]  "

## 2020-09-30 NOTE — PROGRESS NOTES
Pt had a calm cooperative shift.  Denies all mental health issues.  Denies SI SIB .  Denies depression and anxiety.  Eating and sleeping fine.  Spent all shift in lounge watching movies with peers.   No further input at this time      09/29/20 2100   Behavioral Health   Hallucinations denies / not responding to hallucinations   Thinking intact   Orientation time: oriented;date: oriented;place: oriented;person: oriented   Memory baseline memory   Insight insight appropriate to situation   Judgement intact   Eye Contact at examiner   Affect full range affect   Mood mood is calm   Physical Appearance/Attire attire appropriate to age and situation   Suicidality other (see comments)  (denies)   1. Wish to be Dead (Recent) No   Activities of Daily Living   Hygiene/Grooming independent   Oral Hygiene independent   Dress independent   Laundry with supervision   Room Organization independent

## 2020-09-30 NOTE — PLAN OF CARE
Patient is alert and oriented. Spent time between his room and milieu area watching TV. He attended and participated in groups. Not much interaction noted between patient and peers. He presents with a flat, blunted affect. Mood is calm. Denies all MH symptoms. Denies depression and anxiety. Appetite is good. Sleep not good because they wake me up every morning around 0200 for BG check.' He denies pain. Denies any medication SE. BG this shift was 147 before breakfast and 108 before lunch. Staff will continue to monitor.

## 2020-09-30 NOTE — PROGRESS NOTES
[facility], [place]   Psychiatric Progress Note        Interim History:   From H&P: This patient is a 60 year old -American male with history of below diagnoses who presented to ED via EMS after reporting active suicidal ideation and plan to cut his wrists with a  in context of poor follow through on recommendations received in the past to engage in CD treatment, ongoing cocaine and methamphetamine use, homelessness, unemployment, and medication non-adherence. Urine drug screen positive for amphetamines and cocaine on admission. Patient is interested in residential CD programming at this time.    Team meeting report: The patient's care was discussed with the treatment team during the daily team meeting and/or staff's chart notes were reviewed.  Staff report patient has been calm, pleasant, cooperative. Patient is attending groups and participating appropriately. Patient is eating well and taking medications as prescribed.  He was in bed most of the morning.  In the evening, he was in the lounge watching TV with peers.  Did not attend groups. Denied mental health symptoms.  Slept 7 hours.    Met with patient.  He is in bed again.  He has not been sleeping well.  He is awakened at 2 AM for blood sugar check and then cannot fall asleep right away.  Otherwise his mood is good.  He denies mental health symptoms.  He is waiting for CD treatment.  Very pleasant and cooperative.         Medications:       buPROPion  100 mg Oral BID     gabapentin  400 mg Oral TID     insulin aspart   Subcutaneous TID w/meals     insulin aspart  1-7 Units Subcutaneous TID AC     insulin aspart  1-5 Units Subcutaneous At Bedtime     insulin glargine  20 Units Subcutaneous QAM AC          Allergies:   No Known Allergies       Labs:     Recent Results (from the past 672 hour(s))   Drug abuse screen urine    Collection Time: 09/03/20  1:11 AM   Result Value Ref Range    Amphetamine Qual Urine  Negative NEG^Negative    Barbiturates Qual Urine Negative NEG^Negative    Benzodiazepine Qual Urine Negative NEG^Negative    Cannabinoids Qual Urine Negative NEG^Negative    Cocaine Qual Urine Positive (A) NEG^Negative    Opiates Qualitative Urine Negative NEG^Negative    PCP Qual Urine Negative NEG^Negative   Alcohol breath test POCT    Collection Time: 09/25/20  3:22 AM   Result Value Ref Range    Alcohol Breath Test 0.000 0.00 - 0.01   Asymptomatic COVID-19 Virus (Coronavirus) by PCR    Collection Time: 09/25/20  3:03 PM    Specimen: Nasopharyngeal   Result Value Ref Range    COVID-19 Virus PCR to [facility] - Source Nasopharyngeal     COVID-19 Virus PCR to [facility] - Result       Test received-See reflex to IDDL test SARS CoV2 (COVID-19) Virus RT-PCR   SARS-CoV-2 COVID-19 Virus (Coronavirus) RT-PCR Nasopharyngeal    Collection Time: 09/25/20  3:03 PM    Specimen: Nasopharyngeal   Result Value Ref Range    SARS-CoV-2 Virus Specimen Source Nasopharyngeal     SARS-CoV-2 PCR Result NEGATIVE     SARS-CoV-2 PCR Comment       Testing was performed using the Simplexa COVID-19 Direct Assay on the [employer] Liaison MDX   instrument. Additional information about this Emergency Use Authorization (EUA) assay can   be found via the Lab Guide.     Drug abuse screen 6 urine (chem dep)    Collection Time: 09/25/20  4:43 PM   Result Value Ref Range    Amphetamine Qual Urine Positive (A) NEG^Negative    Barbiturates Qual Urine Negative NEG^Negative    Benzodiazepine Qual Urine Negative NEG^Negative    Cannabinoids Qual Urine Negative NEG^Negative    Cocaine Qual Urine Positive (A) NEG^Negative    Ethanol Qual Urine Negative NEG^Negative    Opiates Qualitative Urine Negative NEG^Negative   Glucose by meter    Collection Time: 09/25/20  6:03 PM   Result Value Ref Range    Glucose 255 (H) 70 - 99 mg/dL   Glucose by meter    Collection Time: 09/26/20  7:59 AM   Result Value Ref Range    Glucose 182 (H) 70 - 99 mg/dL   Glucose by meter     Collection Time: 09/26/20 11:28 AM   Result Value Ref Range    Glucose 136 (H) 70 - 99 mg/dL   Glucose by meter    Collection Time: 09/26/20  3:52 PM   Result Value Ref Range    Glucose 197 (H) 70 - 99 mg/dL   Glucose by meter    Collection Time: 09/27/20  8:03 AM   Result Value Ref Range    Glucose 258 (H) 70 - 99 mg/dL   Glucose by meter    Collection Time: 09/27/20 12:12 PM   Result Value Ref Range    Glucose 151 (H) 70 - 99 mg/dL   Glucose by meter    Collection Time: 09/27/20  4:30 PM   Result Value Ref Range    Glucose 261 (H) 70 - 99 mg/dL   Lipid Profile    Collection Time: 09/28/20  7:25 AM   Result Value Ref Range    Cholesterol 133 <200 mg/dL    Triglycerides 95 <150 mg/dL    HDL Cholesterol 54 >39 mg/dL    LDL Cholesterol Calculated 60 <100 mg/dL    Non HDL Cholesterol 79 <130 mg/dL   CBC with platelets differential    Collection Time: 09/28/20  7:25 AM   Result Value Ref Range    WBC 5.5 4.0 - 11.0 10e9/L    RBC Count 4.55 4.4 - 5.9 10e12/L    Hemoglobin 14.1 13.3 - 17.7 g/dL    Hematocrit 40.2 40.0 - 53.0 %    MCV 88 78 - 100 fl    MCH 31.0 26.5 - 33.0 pg    MCHC 35.1 31.5 - 36.5 g/dL    RDW 12.7 10.0 - 15.0 %    Platelet Count 223 150 - 450 10e9/L    Diff Method Automated Method     % Neutrophils 44.3 %    % Lymphocytes 39.9 %    % Monocytes 9.8 %    % Eosinophils 4.9 %    % Basophils 0.7 %    % Immature Granulocytes 0.4 %    Nucleated RBCs 0 0 /100    Absolute Neutrophil 2.4 1.6 - 8.3 10e9/L    Absolute Lymphocytes 2.2 0.8 - 5.3 10e9/L    Absolute Monocytes 0.5 0.0 - 1.3 10e9/L    Absolute Eosinophils 0.3 0.0 - 0.7 10e9/L    Absolute Basophils 0.0 0.0 - 0.2 10e9/L    Abs Immature Granulocytes 0.0 0 - 0.4 10e9/L    Absolute Nucleated RBC 0.0    Comprehensive metabolic panel    Collection Time: 09/28/20  7:25 AM   Result Value Ref Range    Sodium 139 133 - 144 mmol/L    Potassium 4.2 3.4 - 5.3 mmol/L    Chloride 108 94 - 109 mmol/L    Carbon Dioxide 27 20 - 32 mmol/L    Anion Gap 4 3 - 14 mmol/L     Glucose 134 (H) 70 - 99 mg/dL    Urea Nitrogen 12 7 - 30 mg/dL    Creatinine 0.96 0.66 - 1.25 mg/dL    GFR Estimate 87 >60 mL/min/[1.73_m2]    GFR Estimate If Black >90 >60 mL/min/[1.73_m2]    Calcium 8.6 8.5 - 10.1 mg/dL    Bilirubin Total 0.4 0.2 - 1.3 mg/dL    Albumin 3.2 (L) 3.4 - 5.0 g/dL    Protein Total 7.3 6.8 - 8.8 g/dL    Alkaline Phosphatase 59 40 - 150 U/L    ALT 16 0 - 70 U/L    AST 12 0 - 45 U/L   TSH with free T4 reflex    Collection Time: 09/28/20  7:25 AM   Result Value Ref Range    TSH 0.64 0.40 - 4.00 mU/L   Hemoglobin A1c    Collection Time: 09/28/20  7:25 AM   Result Value Ref Range    Hemoglobin A1C 7.7 (H) 0 - 5.6 %   Glucose by meter    Collection Time: 09/28/20  8:08 AM   Result Value Ref Range    Glucose 174 (H) 70 - 99 mg/dL   Glucose by meter    Collection Time: 09/28/20 12:26 PM   Result Value Ref Range    Glucose 113 (H) 70 - 99 mg/dL   Glucose by meter    Collection Time: 09/28/20  5:12 PM   Result Value Ref Range    Glucose 224 (H) 70 - 99 mg/dL   Glucose by meter    Collection Time: 09/28/20 10:12 PM   Result Value Ref Range    Glucose 247 (H) 70 - 99 mg/dL   Glucose by meter    Collection Time: 09/29/20  8:00 AM   Result Value Ref Range    Glucose 104 (H) 70 - 99 mg/dL   Glucose by meter    Collection Time: 09/29/20 11:52 AM   Result Value Ref Range    Glucose 245 (H) 70 - 99 mg/dL   Glucose by meter    Collection Time: 09/29/20  1:27 PM   Result Value Ref Range    Glucose 259 (H) 70 - 99 mg/dL   Glucose by meter    Collection Time: 09/29/20  5:17 PM   Result Value Ref Range    Glucose 327 (H) 70 - 99 mg/dL   Glucose by meter    Collection Time: 09/29/20  9:35 PM   Result Value Ref Range    Glucose 302 (H) 70 - 99 mg/dL   Glucose by meter    Collection Time: 09/30/20  2:23 AM   Result Value Ref Range    Glucose 175 (H) 70 - 99 mg/dL            Psychiatric Examination:   Temp: 98.8  F (37.1  C) Temp src: Oral BP: 120/74 Pulse: 81   Resp: 16        Weight is 231 lbs 6.4 oz  Body mass  index is 28.17 kg/m .    Appearance: well groomed, awake, alert, cooperative, no apparent distress and mildly obese  Attitude:  cooperative  Eye Contact:  good  Mood:  depressed and better  Affect:  mood congruent  Speech:  clear, coherent  Psychomotor Behavior:  no evidence of tardive dyskinesia, dystonia, or tics  Throught Process:  logical and goal oriented  Associations:  no loose associations  Thought Content:  no evidence of suicidal ideation or homicidal ideation  Insight:  good  Judgement:  intact  Oriented to:  time, person, and place  Attention Span and Concentration:  intact  Recent and Remote Memory:  intact         Precautions:     Behavioral Orders   Procedures     Assault precautions     Code 1 - Restrict to Unit     Routine Programming     As clinically indicated     Single Room     Status 15     Every 15 minutes.          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   Type II Diabetes         Plan:   Restart PTA medications, including:  - Wellbutrin 100 mg BID  - Gabapentin 400 mg TID  -PRN medications include trazodone, hydroxyzine, and Zyprexa.  -Nicotine replacement available    -Blood work was reviewed.  -Internal medicine follow-up for medical problems.    Patient is willing to complete CD assessment.     Disposition Plan   Reason for ongoing admission: is unable to care for self due to depression, anxiety, suicidal ideation, in the context of abusing drugs  Disposition: TBD  Estimated length of stay: 5 to 7 days  Legal Status: Voluntary  Discharge will be granted once symptoms improved.      Meilta ROBERTSON CNP  Date: 09/30/20  Time: 3:29 PM      More than 30 minutes were spent for assessment, documentation, and coordination of care.     This note was  created with the help of Dragon dictation system. All grammatical/typing errors or context distortion are unintentional and inherent to software.

## 2020-09-30 NOTE — DISCHARGE INSTRUCTIONS
Behavioral Discharge Planning and Instructions      Summary:  You were admitted on 9/25/2020  due to Suicidal Ideations and Chemical Use Issues.  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      Address: [address] [place] [address]  Phone: [phone]  Fax: [phone]    Attend all scheduled appointments with your outpatient providers. Call at least 24 hours in advance if you need to reschedule an appointment to ensure continued access to your outpatient providers.   Major Treatments, Procedures and Findings:  You were provided with: a psychiatric assessment, assessed for medical stability, medication evaluation and/or management, group therapy, CD evaluation/assessment and milieu management    Symptoms to Report: feeling more aggressive, increased confusion, losing more sleep, mood getting worse or thoughts of suicide    Early warning signs can include: increased depression or anxiety sleep disturbances increased thoughts or behaviors of suicide or self-harm  increased unusual thinking, such as paranoia or hearing voices    Safety and Wellness:  Take all medicines as directed.  Make no changes unless your doctor suggests them.      Follow treatment recommendations.  Refrain from alcohol and non-prescribed drugs.  If there is a concern for safety, call 911.    Resources:   Crisis Intervention: 587.182.6994 or 543-509-0809 (TTY: 929.321.3157).  Call anytime for help.  National [place] on  "Mental Illness (www.[place].isamar.org): 298.487.1165 or 765-746-0160.  Alcoholics Anonymous (www.alcoholics-anonymous.org): Check your phone book for your local chapter.  Suicide Awareness Voices of Education (SAVE) (www.save.org): 574-729-XNGM (6766)  National Suicide Prevention Line (www.mentalhealthmn.org): 914-203-DRWX (2357)  Mental Health Consumer/Survivor Network of [place] (www.mhcsn.net): 235.730.5655 or 449-043-5997  Mental Health Association [place] (www.mentalhealth.org): 638.971.1209 or 541-075-4000  [facility] Crisis (COPE) Response - Adult 920 264-5439  Text 4 Life: txt \"LIFE\" to 18686 for immediate support and crisis intervention  Crisis text line: Text \"[place]\" to 281420. Free, confidential, 24/7.      The treatment team has appreciated the opportunity to work with you.     If you have any questions or concerns our unit number is [phone]    VICTOZA handout-  Liraglutide (By injection)  Liraglutide (pdg-o-BHYI-tide)  Treats type 2 diabetes and helps with weight loss in certain patients. Also reduces the risk of heart attacks and strokes in patients with type 2 diabetes and heart or blood vessel disease.  Brand Name(s):Saxenda,Victoza  There may be other brand names for this medicine.  When This Medicine Should Not Be Used:  This medicine is not right for everyone. Do not use it if you had an allergic reaction to liraglutide, or if you have multiple endocrine neoplasia syndrome type 2 (MEN 2) or if you or anyone in your family had medullary thyroid cancer. Tell your doctor if you are pregnant or have become pregnant while you are using this medicine.  How to Use This Medicine:  Injectable    Your doctor will prescribe your exact dose and tell you how often it should be given. This medicine is given as a shot under your skin. It is usually given in your stomach, thighs, or upper arms.    If you use insulin in addition to Victoza , do not mix them into the same syringe. You may give the shots in the same area " (including your stomach), but do not give the shots right next to each other.    You may be taught how to give your medicine at home. Make sure you understand all instructions before giving yourself an injection. Do not use more medicine or use it more often than your doctor tells you to.    Check the liquid in the pen. It should be clear and colorless. Do not use it if it is cloudy, discolored, or has particles in it.    You will be shown the body areas where this shot can be given. Use a different body area each time you give yourself a shot. Keep track of where you give each shot to make sure you rotate body areas.    Use a new needle and syringe each time you inject your medicine.    Never share medicine pens with others under any circumstances. Sharing needles or pens can result in transmission of infection.    Drink extra fluids so you will urinate more often and help prevent kidney problems.    This medicine should come with a Medication Guide. Ask your pharmacist for a copy if you do not have one.    Missed dose: If you miss a dose of this medicine, skip the missed dose and go back to your regular dosing schedule. Never take extra medicine to make up for a missed dose. If you miss a dose for 3 days or more, call your doctor to talk about how to restart your treatment.    Store your new, unused medicine pen in its original carton in the refrigerator. Protect it from light. Do not freeze this medicine or use it if it has been frozen. You may store the opened medicine pen in the refrigerator or at room temperature for 30 days. Throw away any unused medicine after 30 days, even if it still has medicine in it. Always remove the needle from the pen before you store it.    Throw away used needles in a hard, closed container that the needles cannot poke through. Keep this container away from children and pets.  Drugs and Foods to Avoid:  Ask your doctor or pharmacist before using any other medicine, including  over-the-counter medicines, vitamins, and herbal products.    Do not use Saxenda  together with insulin.    Some medicines can affect how Victoza   works. Tell your doctor if you are using insulin or other diabetes medicine (including ??glibenclamide, glimepiride, glipizide) or any other oral medicine.  Warnings While Using This Medicine:    Tell your doctor if you are breastfeeding, or if you have kidney disease, liver disease, digestion problems (including gastroparesis), gallbladder disease, or a history of pancreas problems, depression, or angioedema (swelling of the arms, face, hands, mouth, or throat).    Do not use Saxenda  if you are also using Victoza . They contain the same medicine.    This medicine may cause the following problems:   ? Increased risk for thyroid tumors  ? Pancreatitis (swelling of the pancreas)  ? Low blood sugar (when used together with insulin or other diabetes medicine)  ? Kidney problems  ? Gallbladder problems, including gallstones  ? Thoughts of hurting yourself (Saxenda )    Your doctor will do lab tests at regular visits to check on the effects of this medicine. Keep all appointments.    Keep all medicine out of the reach of children. Never share your medicine with anyone.  Possible Side Effects While Using This Medicine:  Call your doctor right away if you notice any of these side effects:    Allergic reaction: Itching or hives, swelling in your face or hands, swelling or tingling in your mouth or throat, chest tightness, trouble breathing    Change in how much or how often you urinate, painful or burning urination    Feeling sad or depressed, thoughts of suicide, unusual changes in mood or behavior    Shaking, trembling, sweating, fast or pounding heartbeat, fainting, hunger, confusion    Sudden and severe stomach pain, nausea, vomiting, fever, lightheadedness, yellow skin or eyes    Trouble breathing or swallowing, a lump in your neck, hoarseness when speaking  If you notice  these less serious side effects, talk with your doctor:    Decreased appetite    Diarrhea, constipation, stomach upset    Dizziness    Headache    Redness, itching, swelling, or any changes in your skin where the shot was given  If you notice other side effects that you think are caused by this medicine, tell your doctor.  Call your doctor for medical advice about side effects. You may report side effects to FDA at 7-982-BSB-4469

## 2020-10-01 VITALS
DIASTOLIC BLOOD PRESSURE: 88 MMHG | TEMPERATURE: 98.5 F | BODY MASS INDEX: 28.34 KG/M2 | HEART RATE: 75 BPM | WEIGHT: 232.7 LBS | RESPIRATION RATE: 18 BRPM | OXYGEN SATURATION: 99 % | HEIGHT: 76 IN | SYSTOLIC BLOOD PRESSURE: 149 MMHG

## 2020-10-01 LAB
GLUCOSE BLDC GLUCOMTR-MCNC: 143 MG/DL (ref 70–99)
GLUCOSE BLDC GLUCOMTR-MCNC: 88 MG/DL (ref 70–99)

## 2020-10-01 PROCEDURE — 250N000013 HC RX MED GY IP 250 OP 250 PS 637: Performed by: PSYCHIATRY & NEUROLOGY

## 2020-10-01 PROCEDURE — 999N001017 HC STATISTIC GLUCOSE BY METER IP

## 2020-10-01 PROCEDURE — 99239 HOSP IP/OBS DSCHRG MGMT >30: CPT | Performed by: NURSE PRACTITIONER

## 2020-10-01 PROCEDURE — 99232 SBSQ HOSP IP/OBS MODERATE 35: CPT | Mod: 95 | Performed by: CLINICAL NURSE SPECIALIST

## 2020-10-01 RX ADMIN — GABAPENTIN 400 MG: 400 CAPSULE ORAL at 08:14

## 2020-10-01 RX ADMIN — BUPROPION HYDROCHLORIDE 100 MG: 100 TABLET, EXTENDED RELEASE ORAL at 08:14

## 2020-10-01 ASSESSMENT — ACTIVITIES OF DAILY LIVING (ADL)
LAUNDRY: WITH SUPERVISION
HYGIENE/GROOMING: INDEPENDENT
ORAL_HYGIENE: INDEPENDENT
DRESS: SCRUBS (BEHAVIORAL HEALTH);INDEPENDENT

## 2020-10-01 ASSESSMENT — MIFFLIN-ST. JEOR: SCORE: 1977.02

## 2020-10-01 NOTE — PROGRESS NOTES
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  Diabetes consult 9/28 for alternatives to metformin management      Post-prandial BG above target after first dose Victoza, but fasting lower than target.    -glargine reduced to 15 units  -continue liraglutide 0.6 mg with supper daily  - aspart medium resistance qAC, HS  - test claim request to pharmacy liaison--> Victoza 3.00 copay    - diabetes education consult requested but unable to complete before discharge this morning-- Rec outpt CDE if pt desires  - Victoza handout pasted into AVS    Outpatient diabetes follow up: PCP, pt needs to establish  Plan discussed with patient    Plan for discharge (pasted into AVS):    Victoza 0.6 mg daily (advance to 1.2 mg after one week if tolerating)  Lantus 15 units daily (expect dose reduction or discontinuation after Victoza dose advancement)  Check glucose two times per day, before meals  Moderate carb intake  - needs glucometer/supplies as well as injectables, pen needles, alcohol wipes, sharps container        Future prescriptions may need to be one or two pens at a time if pt doesn't have access to refrigerated storage.           Interval History:     The last 24 hours progress and nursing notes reviewed.  Discharge today 1100.   "Residential treatment program.    Brody is doing okay.  Eating well.  No GI SE from first dose victoza.  He observed RNs doing injections and feels he could do it.  Rec further nursing reinforcement in treatment program as needed, outpt follow up w/ CDE.  Brody denies any questions.          Recent Labs   Lab 10/01/20  0723 10/01/20  0225 09/30/20  2208 09/30/20  1548 09/30/20  1208 09/30/20  0759 09/28/20  0725 09/28/20  0725   GLC  --   --   --   --   --   --   --  134*   BGM 88 143* 233* 134* 108* 147*   < >  --     < > = values in this interval not displayed.       Patient is interested in residential CD programming, Rule 25 completed yesterday, no placement secured yet.  Anticipated length of stay: 7 days        Nutrition:     Orders Placed This Encounter      Regular Diet Adult            PTA Regimen:     Prescribed Metformin, but not taking x one month - does not tolerate (GI side effects)  No PTA regimen          Review of Systems:   See interval hx          Medications:   No steroid         Physical Exam:     Gen: Alert, in NAD   HEENT: NC/AT hearing intact to conversational volume  Resp: Unlabored  Neuro: oriented x3, communicating clearly  Psych: calm, even mood.  Engaged moderately  BP (!) 149/88 (BP Location: Right arm)   Pulse 75   Temp 98.5  F (36.9  C) (Oral)   Resp 18   Ht 1.93 m (6' 4\")   Wt 105.6 kg (232 lb 11.2 oz)   SpO2 99%   BMI 28.33 kg/m               Data:     Lab Results   Component Value Date    A1C 7.7 09/28/2020    A1C 8.2 04/06/2019              CBC RESULTS:   Recent Labs   Lab Test 09/28/20 0725   WBC 5.5   RBC 4.55   HGB 14.1   HCT 40.2   MCV 88   MCH 31.0   MCHC 35.1   RDW 12.7        Recent Labs   Lab Test 09/28/20  0725 07/26/20  0158    134   POTASSIUM 4.2 4.6   CHLORIDE 108 103   CO2 27 25   ANIONGAP 4 6   * 364*   BUN 12 22   CR 0.96 1.24   DAYANA 8.6 9.5     Liver Function Studies -   Recent Labs   Lab Test 09/28/20 0725   PROTTOTAL 7.3   ALBUMIN 3.2* " "  BILITOTAL 0.4   ALKPHOS 59   AST 12   ALT 16     No results found for: INR          Due to coronavirus precautions, a telephone visit instead of an in-person visit took place.       Brody Colindres is a 60 y male who is being evaluated via a billable telephone visit.             \"This telephone visit will be conducted via a call between patient and  provider. We have found that certain health care needs can be provided without the need for a full physical exam.  This service lets us provide the care you need with a phone conversation.\"       Patient has given verbal consent for Telephone visit? Yes           I spent a total of 5 minutes via telephone with patient, and 20 minutes floor time /care coordination managing glycemic care. See note for details.               Jeny John APRN -[id number]  Diabetes Management job code [id number]                "

## 2020-10-01 NOTE — PLAN OF CARE
"Discharge Note    Diagnosis: Suicidal thoughts [R45.851]  Admit Date: 9/25/2020  Precautions: Orders Placed This Encounter      Assault precautions      Single Room     Recent Vitals: BP (!) 149/88 (BP Location: Right arm)   Pulse 75   Temp 98.5  F (36.9  C) (Oral)   Resp 18   Ht 1.93 m (6' 4\")   Wt 105.6 kg (232 lb 11.2 oz)   SpO2 99%   BMI 28.33 kg/m  ,  , Sitting Orthostatic BP: 143/85, Standing Orthostatic BP: 126/79     Patient discharged at 1040 to [facility]. Patient was walked to [facility] first to  belongings and then out to the [facility] entrance where he was picked up cab. Staff witnessed him being picked up. He left in his personal clothing. Patient was provided education on his AVS and medications. Questions were answered about his medications. He was given additional information about diabetes and medication administration. He stated all questions were answered. Patient was calm and cooperative, participated in morning group, denied having depression, anxiety, SI/HI/SIB/AVH. He voiced feeling good about discharging. Hygiene is good, he is eating well. Staff went over patient's belongings with him, he stated all items were present.    Morena Rogers RN MSN    "

## 2020-10-01 NOTE — PROGRESS NOTES
Pt has been calm and cooperative w/ staff. Occasionally social w/ peers and staff in [facility]. Pt spent the majority of the shift in [facility] area watching TV. Good appetite and sleep. Pt denies SI, SIB, and hallucinations. Pt was initially uncooperative when asked to wait in his room during a separate patient code, but was eventually persuaded to wait in room without issue. Blunted affect, slightly irritable, but intact.     09/30/20 2000   Behavioral Health   Hallucinations denies / not responding to hallucinations   Thinking intact   Orientation person: oriented;place: oriented;date: oriented;time: oriented   Memory baseline memory   Insight insight appropriate to situation   Judgement intact   Eye Contact at examiner   Affect blunted, flat   Mood mood is calm   Physical Appearance/Attire appears stated age;attire appropriate to age and situation   Hygiene well groomed   1. Wish to be Dead (Recent) No   2. Non-Specific Active Suicidal Thoughts (Recent) No   3. Active Sucidal Ideation with any Methods (Not Plan) Without Intent to Act (Recent) No   4. Active Suicidal Ideation with Some Intent to Act, Without Specific Plan (Recent) No   5. Active Suicidal Ideation with Specific Plan and Intent (Recent) No   Speech clear;coherent   Medication Sensitivity no stated side effects;no observed side effects   Psychomotor / Gait balanced;steady   Activities of Daily Living   Hygiene/Grooming independent   Oral Hygiene independent   Dress scrubs (behavioral health)   Laundry with supervision   Room Organization independent

## 2020-10-01 NOTE — DISCHARGE SUMMARY
Psychiatric Discharge Summary    Brody Colindres MRN# [id number]   Age: 60 year old YOB: 1960     Date of Admission:  9/25/2020  Date of Discharge:  10/1/2020  Admitting Provider:  Juan A Caban MD  Discharge Provider:  AILYN Kearney CNP         Event Leading to Hospitalization:   This patient is a 60 year old -American male with history of below diagnoses who presented to ED via EMS after reporting active suicidal ideation and plan to cut his wrists with a  in context of poor follow through on recommendations received in the past to engage in CD treatment, ongoing cocaine and methamphetamine use, homelessness, unemployment, and medication non-adherence. Urine drug screen positive for amphetamines and cocaine on admission. Symptoms and presentation at this time is most consistent with depressive disorder, unspecified. Patient is interested in residential CD programming at this time. Inpatient psychiatric hospitalization is warranted at this time for safety, stabilization, and possible adjustment in medications.      See Admission note by Emilee Rao MD, on 9/26/2020 for additional details.          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, recurrent, severe with psychotic features)  Stimulant Use Disorder, cocaine type, unspecified severity  Amphetamine Use Disorder, unspecified severity  Nicotine Use Disorder  R/O PTSD  R/O malingering  Type II Diabetes         Labs:     Recent Results (from the past 168 hour(s))   Alcohol breath test POCT    Collection Time: 09/25/20  3:22 AM   Result Value Ref Range    Alcohol Breath Test 0.000 0.00 - 0.01   Asymptomatic COVID-19 Virus (Coronavirus) by PCR    Collection Time: 09/25/20  3:03 PM    Specimen: Nasopharyngeal   Result Value Ref Range    COVID-19 Virus PCR to [facility] - Source Nasopharyngeal     COVID-19 Virus PCR to [facility] - Result       Test received-See reflex to IDDL  test SARS CoV2 (COVID-19) Virus RT-PCR   SARS-CoV-2 COVID-19 Virus (Coronavirus) RT-PCR Nasopharyngeal    Collection Time: 09/25/20  3:03 PM    Specimen: Nasopharyngeal   Result Value Ref Range    SARS-CoV-2 Virus Specimen Source Nasopharyngeal     SARS-CoV-2 PCR Result NEGATIVE     SARS-CoV-2 PCR Comment       Testing was performed using the [employer]plex[employer] COVID-19 Direct Assay on the [employer] MDX   instrument. Additional information about this Emergency Use Authorization (EUA) assay can   be found via the Lab Guide.     Drug abuse screen 6 urine (chem dep)    Collection Time: 09/25/20  4:43 PM   Result Value Ref Range    Amphetamine Qual Urine Positive (A) NEG^Negative    Barbiturates Qual Urine Negative NEG^Negative    Benzodiazepine Qual Urine Negative NEG^Negative    Cannabinoids Qual Urine Negative NEG^Negative    Cocaine Qual Urine Positive (A) NEG^Negative    Ethanol Qual Urine Negative NEG^Negative    Opiates Qualitative Urine Negative NEG^Negative   Glucose by meter    Collection Time: 09/25/20  6:03 PM   Result Value Ref Range    Glucose 255 (H) 70 - 99 mg/dL   Glucose by meter    Collection Time: 09/26/20  7:59 AM   Result Value Ref Range    Glucose 182 (H) 70 - 99 mg/dL   Glucose by meter    Collection Time: 09/26/20 11:28 AM   Result Value Ref Range    Glucose 136 (H) 70 - 99 mg/dL   Glucose by meter    Collection Time: 09/26/20  3:52 PM   Result Value Ref Range    Glucose 197 (H) 70 - 99 mg/dL   Glucose by meter    Collection Time: 09/27/20  8:03 AM   Result Value Ref Range    Glucose 258 (H) 70 - 99 mg/dL   Glucose by meter    Collection Time: 09/27/20 12:12 PM   Result Value Ref Range    Glucose 151 (H) 70 - 99 mg/dL   Glucose by meter    Collection Time: 09/27/20  4:30 PM   Result Value Ref Range    Glucose 261 (H) 70 - 99 mg/dL   Lipid Profile    Collection Time: 09/28/20  7:25 AM   Result Value Ref Range    Cholesterol 133 <200 mg/dL    Triglycerides 95 <150 mg/dL    HDL Cholesterol 54 >39  mg/dL    LDL Cholesterol Calculated 60 <100 mg/dL    Non HDL Cholesterol 79 <130 mg/dL   CBC with platelets differential    Collection Time: 09/28/20  7:25 AM   Result Value Ref Range    WBC 5.5 4.0 - 11.0 10e9/L    RBC Count 4.55 4.4 - 5.9 10e12/L    Hemoglobin 14.1 13.3 - 17.7 g/dL    Hematocrit 40.2 40.0 - 53.0 %    MCV 88 78 - 100 fl    MCH 31.0 26.5 - 33.0 pg    MCHC 35.1 31.5 - 36.5 g/dL    RDW 12.7 10.0 - 15.0 %    Platelet Count 223 150 - 450 10e9/L    Diff Method Automated Method     % Neutrophils 44.3 %    % Lymphocytes 39.9 %    % Monocytes 9.8 %    % Eosinophils 4.9 %    % Basophils 0.7 %    % Immature Granulocytes 0.4 %    Nucleated RBCs 0 0 /100    Absolute Neutrophil 2.4 1.6 - 8.3 10e9/L    Absolute Lymphocytes 2.2 0.8 - 5.3 10e9/L    Absolute Monocytes 0.5 0.0 - 1.3 10e9/L    Absolute Eosinophils 0.3 0.0 - 0.7 10e9/L    Absolute Basophils 0.0 0.0 - 0.2 10e9/L    Abs Immature Granulocytes 0.0 0 - 0.4 10e9/L    Absolute Nucleated RBC 0.0    Comprehensive metabolic panel    Collection Time: 09/28/20  7:25 AM   Result Value Ref Range    Sodium 139 133 - 144 mmol/L    Potassium 4.2 3.4 - 5.3 mmol/L    Chloride 108 94 - 109 mmol/L    Carbon Dioxide 27 20 - 32 mmol/L    Anion Gap 4 3 - 14 mmol/L    Glucose 134 (H) 70 - 99 mg/dL    Urea Nitrogen 12 7 - 30 mg/dL    Creatinine 0.96 0.66 - 1.25 mg/dL    GFR Estimate 87 >60 mL/min/[1.73_m2]    GFR Estimate If Black >90 >60 mL/min/[1.73_m2]    Calcium 8.6 8.5 - 10.1 mg/dL    Bilirubin Total 0.4 0.2 - 1.3 mg/dL    Albumin 3.2 (L) 3.4 - 5.0 g/dL    Protein Total 7.3 6.8 - 8.8 g/dL    Alkaline Phosphatase 59 40 - 150 U/L    ALT 16 0 - 70 U/L    AST 12 0 - 45 U/L   TSH with free T4 reflex    Collection Time: 09/28/20  7:25 AM   Result Value Ref Range    TSH 0.64 0.40 - 4.00 mU/L   Hemoglobin A1c    Collection Time: 09/28/20  7:25 AM   Result Value Ref Range    Hemoglobin A1C 7.7 (H) 0 - 5.6 %   Glucose by meter    Collection Time: 09/28/20  8:08 AM   Result Value Ref  Range    Glucose 174 (H) 70 - 99 mg/dL   Glucose by meter    Collection Time: 09/28/20 12:26 PM   Result Value Ref Range    Glucose 113 (H) 70 - 99 mg/dL   Glucose by meter    Collection Time: 09/28/20  5:12 PM   Result Value Ref Range    Glucose 224 (H) 70 - 99 mg/dL   Glucose by meter    Collection Time: 09/28/20 10:12 PM   Result Value Ref Range    Glucose 247 (H) 70 - 99 mg/dL   Glucose by meter    Collection Time: 09/29/20  8:00 AM   Result Value Ref Range    Glucose 104 (H) 70 - 99 mg/dL   Glucose by meter    Collection Time: 09/29/20 11:52 AM   Result Value Ref Range    Glucose 245 (H) 70 - 99 mg/dL   Glucose by meter    Collection Time: 09/29/20  1:27 PM   Result Value Ref Range    Glucose 259 (H) 70 - 99 mg/dL   Glucose by meter    Collection Time: 09/29/20  5:17 PM   Result Value Ref Range    Glucose 327 (H) 70 - 99 mg/dL   Glucose by meter    Collection Time: 09/29/20  9:35 PM   Result Value Ref Range    Glucose 302 (H) 70 - 99 mg/dL   Glucose by meter    Collection Time: 09/30/20  2:23 AM   Result Value Ref Range    Glucose 175 (H) 70 - 99 mg/dL   Glucose by meter    Collection Time: 09/30/20  7:59 AM   Result Value Ref Range    Glucose 147 (H) 70 - 99 mg/dL   Glucose by meter    Collection Time: 09/30/20 12:08 PM   Result Value Ref Range    Glucose 108 (H) 70 - 99 mg/dL   Glucose by meter    Collection Time: 09/30/20  3:48 PM   Result Value Ref Range    Glucose 134 (H) 70 - 99 mg/dL   Glucose by meter    Collection Time: 09/30/20 10:08 PM   Result Value Ref Range    Glucose 233 (H) 70 - 99 mg/dL   Glucose by meter    Collection Time: 10/01/20  2:25 AM   Result Value Ref Range    Glucose 143 (H) 70 - 99 mg/dL   Glucose by meter    Collection Time: 10/01/20  7:23 AM   Result Value Ref Range    Glucose 88 70 - 99 mg/dL                Consults:   Consultation during this admission received from internal medicine         Hospital Course:   Brody Colindres was admitted to [facility] with attending Melita  VIVIEN ROBERTSON CNP, as a voluntary patient. The patient was placed under status 15 (15 minute checks) to ensure patient safety.     The following medication changes took place:   - Wellbutrin 100 mg BID  - Gabapentin 400 mg TID     The patient tolerated medications well. Reported mood symptoms resolved. The patient was active on the unit. The patient was social, engaged and attended groups. No overt artme, confusion or psychosis noted. The patient maintained denial of SI, HI and BRIEN. The patient denied depression, anxiety, racing thoughts and irritability.Future oriented, feeling hopeful for the future. The patient slept well. Appetite was intact. The patient was compliant with medications and care.     Brody Colindres was released to residential CD treatment. At the time of this encounter, Brody Colindres was determined to not be a danger to himself or others and symptoms did not meet criteria for involuntary hospitalization.      Safety plan, post discharge recommendations and relapse prevention were discussed with the patient. The patient agreed to call 911 or present to ED if symptoms worsen or developed thoughts of suicide, self harm or homicide.  The patient agreed to continue medications and outpatient care.         Discharge Medications:     Current Discharge Medication List      START taking these medications    Details   alcohol swab prep pads Use to swab area of injection/nicci as directed.  Qty: 100 each, Refills: 3    Associated Diagnoses: Insulin dependent type 2 diabetes mellitus (H)      blood glucose (NO BRAND SPECIFIED) test strip Use to test blood sugar 4 times daily or as directed. To accompany: Blood Glucose Monitor Brands: per insurance.  Qty: 100 strip, Refills: 6    Associated Diagnoses: Insulin dependent type 2 diabetes mellitus (H)      blood glucose calibration (NO BRAND SPECIFIED) solution To accompany: Blood Glucose Monitor Brands: per insurance.  Qty: 1 Bottle, Refills: 3     Associated Diagnoses: Insulin dependent type 2 diabetes mellitus (H)      blood glucose monitoring (NO BRAND SPECIFIED) meter device kit Use to test blood sugar 4 times daily or as directed. Preferred blood glucose meter OR supplies to accompany: Blood Glucose Monitor Brands: per insurance.  Qty: 1 kit, Refills: 0    Associated Diagnoses: Insulin dependent type 2 diabetes mellitus (H)      insulin glargine (LANTUS PEN) 100 UNIT/ML pen Inject 15 Units Subcutaneous every 24 hours  Qty: 3 mL, Refills: 0    Comments: If Lantus is not covered by insurance, may substitute Basaglar at same dose and frequency.    Associated Diagnoses: Insulin dependent type 2 diabetes mellitus (H)      liraglutide (VICTOZA) 18 MG/3ML solution Inject 0.6 mg Subcutaneous daily (with dinner)  Qty: 3 mL, Refills: 1    Associated Diagnoses: Insulin dependent type 2 diabetes mellitus (H)      thin (NO BRAND SPECIFIED) lancets Use with lanceting device. To accompany: Blood Glucose Monitor Brands: per insurance.  Qty: 120 each, Refills: 6    Associated Diagnoses: Insulin dependent type 2 diabetes mellitus (H)         CONTINUE these medications which have CHANGED    Details   buPROPion (WELLBUTRIN SR) 100 MG 12 hr tablet Take 1 tablet (100 mg) by mouth 2 times daily  Qty: 60 tablet, Refills: 0    Associated Diagnoses: Recurrent major depressive disorder, remission status unspecified (H)      gabapentin (NEURONTIN) 400 MG capsule Take 1 capsule (400 mg) by mouth 3 times daily  Qty: 90 capsule, Refills: 0    Associated Diagnoses: Recurrent major depressive disorder, remission status unspecified (H)         STOP taking these medications       metFORMIN (GLUCOPHAGE) 1000 MG tablet Comments:   Reason for Stopping:         mirtazapine (REMERON) 15 MG tablet Comments:   Reason for Stopping:         QUEtiapine (SEROQUEL) 50 MG tablet Comments:   Reason for Stopping:                    Psychiatric and Physical Examinations:   Appearance:  adequately groomed,  awake, alert, cooperative and no apparent distress  Attitude:  cooperative  Eye Contact:  good  Mood:  good  Affect:  appropriate and in normal range  Speech:  clear, coherent  Psychomotor Behavior:  no evidence of tardive dyskinesia, dystonia, or tics  Thought Process:  logical and goal oriented  Associations:  no loose associations  Thought Content:  no evidence of suicidal ideation or homicidal ideation  Insight:  good  Judgment:  intact  Oriented to:  time, person, and place  Attention Span and Concentration:  intact  Recent and Remote Memory:  intact  Language and Fund of Knowledge: appropriate  Muscle Strength and Tone: normal  Gait and Station: Normal  Vitals:    09/29/20 1716 09/30/20 0817 09/30/20 1639 10/01/20 0742   BP: 120/74  (!) 149/88    BP Location:   Right arm    Pulse: 81  75    Resp:  18     Temp: 98.8  F (37.1  C) 98.4  F (36.9  C) 98.9  F (37.2  C) 98.5  F (36.9  C)   TempSrc: Oral Oral Oral Oral   SpO2:   99%    Weight:    105.6 kg (232 lb 11.2 oz)   Height:                Discharge Plan:     Follow up Appointment:  [facility]  Date/Time:  10/01/2020 (Thursday) at 11:00am.      Address: [address] [place] [address]  Phone: [phone]  Fax: [phone]         Attestation:  The patient has been seen and evaluated by me,  Melita ROBERTSON, CNP

## 2020-10-13 ENCOUNTER — HOSPITAL ENCOUNTER (INPATIENT)
Facility: CLINIC | Age: 60
LOS: 7 days | Discharge: PSYCHIATRIC OR SUBSTANCE USE FACILITY | End: 2020-10-20
Attending: PSYCHIATRY & NEUROLOGY | Admitting: PSYCHIATRY & NEUROLOGY
Payer: COMMERCIAL

## 2020-10-13 ENCOUNTER — HOSPITAL ENCOUNTER (EMERGENCY)
Facility: CLINIC | Age: 60
Discharge: OTHER INSTITUTION | End: 2020-10-13
Attending: EMERGENCY MEDICINE | Admitting: EMERGENCY MEDICINE
Payer: COMMERCIAL

## 2020-10-13 ENCOUNTER — TELEPHONE (OUTPATIENT)
Dept: BEHAVIORAL HEALTH | Facility: CLINIC | Age: 60
End: 2020-10-13

## 2020-10-13 VITALS
OXYGEN SATURATION: 100 % | DIASTOLIC BLOOD PRESSURE: 89 MMHG | BODY MASS INDEX: 30.09 KG/M2 | TEMPERATURE: 98 F | SYSTOLIC BLOOD PRESSURE: 141 MMHG | HEART RATE: 83 BPM | WEIGHT: 227 LBS | HEIGHT: 73 IN | RESPIRATION RATE: 20 BRPM

## 2020-10-13 DIAGNOSIS — E11.9 INSULIN DEPENDENT TYPE 2 DIABETES MELLITUS (H): ICD-10-CM

## 2020-10-13 DIAGNOSIS — F19.10 SUBSTANCE ABUSE (H): Primary | ICD-10-CM

## 2020-10-13 DIAGNOSIS — R45.851 SUICIDAL THOUGHTS: ICD-10-CM

## 2020-10-13 DIAGNOSIS — F33.9 RECURRENT MAJOR DEPRESSIVE DISORDER, REMISSION STATUS UNSPECIFIED (H): ICD-10-CM

## 2020-10-13 DIAGNOSIS — Z79.4 INSULIN DEPENDENT TYPE 2 DIABETES MELLITUS (H): ICD-10-CM

## 2020-10-13 DIAGNOSIS — Z91.199 PERSONAL HISTORY OF NONCOMPLIANCE WITH MEDICAL TREATMENT, PRESENTING HAZARDS TO HEALTH: ICD-10-CM

## 2020-10-13 DIAGNOSIS — F19.10 POLYSUBSTANCE ABUSE (H): ICD-10-CM

## 2020-10-13 DIAGNOSIS — F22 PARANOIA (H): ICD-10-CM

## 2020-10-13 LAB
AMPHETAMINES UR QL SCN: POSITIVE
BARBITURATES UR QL: NEGATIVE
BENZODIAZ UR QL: NEGATIVE
CANNABINOIDS UR QL SCN: NEGATIVE
COCAINE UR QL: POSITIVE
GLUCOSE BLDC GLUCOMTR-MCNC: 185 MG/DL (ref 70–99)
GLUCOSE BLDC GLUCOMTR-MCNC: 261 MG/DL (ref 70–99)
GLUCOSE BLDC GLUCOMTR-MCNC: 330 MG/DL (ref 70–99)
OPIATES UR QL SCN: NEGATIVE
PCP UR QL SCN: NEGATIVE
SARS-COV-2 RNA SPEC QL NAA+PROBE: NORMAL
SPECIMEN SOURCE: NORMAL

## 2020-10-13 PROCEDURE — U0003 INFECTIOUS AGENT DETECTION BY NUCLEIC ACID (DNA OR RNA); SEVERE ACUTE RESPIRATORY SYNDROME CORONAVIRUS 2 (SARS-COV-2) (CORONAVIRUS DISEASE [COVID-19]), AMPLIFIED PROBE TECHNIQUE, MAKING USE OF HIGH THROUGHPUT TECHNOLOGIES AS DESCRIBED BY CMS-2020-01-R: HCPCS | Performed by: EMERGENCY MEDICINE

## 2020-10-13 PROCEDURE — 250N000013 HC RX MED GY IP 250 OP 250 PS 637: Performed by: EMERGENCY MEDICINE

## 2020-10-13 PROCEDURE — 250N000012 HC RX MED GY IP 250 OP 636 PS 637: Performed by: NURSE PRACTITIONER

## 2020-10-13 PROCEDURE — 80307 DRUG TEST PRSMV CHEM ANLYZR: CPT | Performed by: EMERGENCY MEDICINE

## 2020-10-13 PROCEDURE — 99285 EMERGENCY DEPT VISIT HI MDM: CPT | Mod: 25

## 2020-10-13 PROCEDURE — 90791 PSYCH DIAGNOSTIC EVALUATION: CPT

## 2020-10-13 PROCEDURE — 250N000009 HC RX 250: Performed by: NURSE PRACTITIONER

## 2020-10-13 PROCEDURE — 250N000013 HC RX MED GY IP 250 OP 250 PS 637: Performed by: NURSE PRACTITIONER

## 2020-10-13 PROCEDURE — 999N001017 HC STATISTIC GLUCOSE BY METER IP

## 2020-10-13 PROCEDURE — 124N000003 HC R&B MH SENIOR/ADOLESCENT

## 2020-10-13 PROCEDURE — C9803 HOPD COVID-19 SPEC COLLECT: HCPCS

## 2020-10-13 RX ORDER — OLANZAPINE 10 MG/1
10 TABLET ORAL
Status: DISCONTINUED | OUTPATIENT
Start: 2020-10-13 | End: 2020-10-20 | Stop reason: HOSPADM

## 2020-10-13 RX ORDER — BISACODYL 10 MG
10 SUPPOSITORY, RECTAL RECTAL DAILY PRN
Status: DISCONTINUED | OUTPATIENT
Start: 2020-10-13 | End: 2020-10-20 | Stop reason: HOSPADM

## 2020-10-13 RX ORDER — POLYETHYLENE GLYCOL 3350 17 G
2-4 POWDER IN PACKET (EA) ORAL
Status: DISCONTINUED | OUTPATIENT
Start: 2020-10-13 | End: 2020-10-20 | Stop reason: HOSPADM

## 2020-10-13 RX ORDER — NICOTINE 21 MG/24HR
1 PATCH, TRANSDERMAL 24 HOURS TRANSDERMAL EVERY 24 HOURS
Status: DISCONTINUED | OUTPATIENT
Start: 2020-10-13 | End: 2020-10-20 | Stop reason: HOSPADM

## 2020-10-13 RX ORDER — GABAPENTIN 400 MG/1
400 CAPSULE ORAL 3 TIMES DAILY
Status: DISCONTINUED | OUTPATIENT
Start: 2020-10-13 | End: 2020-10-20 | Stop reason: HOSPADM

## 2020-10-13 RX ORDER — OLANZAPINE 10 MG/2ML
10 INJECTION, POWDER, FOR SOLUTION INTRAMUSCULAR
Status: DISCONTINUED | OUTPATIENT
Start: 2020-10-13 | End: 2020-10-20 | Stop reason: HOSPADM

## 2020-10-13 RX ORDER — TRAZODONE HYDROCHLORIDE 50 MG/1
50 TABLET, FILM COATED ORAL
Status: DISCONTINUED | OUTPATIENT
Start: 2020-10-13 | End: 2020-10-20 | Stop reason: HOSPADM

## 2020-10-13 RX ORDER — DEXTROSE MONOHYDRATE 25 G/50ML
25-50 INJECTION, SOLUTION INTRAVENOUS
Status: DISCONTINUED | OUTPATIENT
Start: 2020-10-13 | End: 2020-10-20 | Stop reason: HOSPADM

## 2020-10-13 RX ORDER — NICOTINE POLACRILEX 4 MG
15-30 LOZENGE BUCCAL
Status: DISCONTINUED | OUTPATIENT
Start: 2020-10-13 | End: 2020-10-20 | Stop reason: HOSPADM

## 2020-10-13 RX ORDER — HYDROXYZINE HYDROCHLORIDE 25 MG/1
50 TABLET, FILM COATED ORAL ONCE
Status: COMPLETED | OUTPATIENT
Start: 2020-10-13 | End: 2020-10-13

## 2020-10-13 RX ORDER — LIRAGLUTIDE 6 MG/ML
0.6 INJECTION SUBCUTANEOUS
Status: DISCONTINUED | OUTPATIENT
Start: 2020-10-13 | End: 2020-10-20 | Stop reason: HOSPADM

## 2020-10-13 RX ORDER — ALUMINA, MAGNESIA, AND SIMETHICONE 2400; 2400; 240 MG/30ML; MG/30ML; MG/30ML
30 SUSPENSION ORAL EVERY 4 HOURS PRN
Status: DISCONTINUED | OUTPATIENT
Start: 2020-10-13 | End: 2020-10-20 | Stop reason: HOSPADM

## 2020-10-13 RX ORDER — ACETAMINOPHEN 325 MG/1
650 TABLET ORAL EVERY 4 HOURS PRN
Status: DISCONTINUED | OUTPATIENT
Start: 2020-10-13 | End: 2020-10-20 | Stop reason: HOSPADM

## 2020-10-13 RX ORDER — HYDROXYZINE HYDROCHLORIDE 25 MG/1
25 TABLET, FILM COATED ORAL EVERY 4 HOURS PRN
Status: DISCONTINUED | OUTPATIENT
Start: 2020-10-13 | End: 2020-10-20 | Stop reason: HOSPADM

## 2020-10-13 RX ADMIN — HYDROXYZINE HYDROCHLORIDE 50 MG: 25 TABLET, FILM COATED ORAL at 11:09

## 2020-10-13 RX ADMIN — LIRAGLUTIDE 0.6 MG: 6 INJECTION SUBCUTANEOUS at 21:46

## 2020-10-13 RX ADMIN — GABAPENTIN 400 MG: 400 CAPSULE ORAL at 21:51

## 2020-10-13 RX ADMIN — NICOTINE 1 PATCH: 21 PATCH, EXTENDED RELEASE TRANSDERMAL at 21:51

## 2020-10-13 RX ADMIN — INSULIN GLARGINE 15 UNITS: 100 INJECTION, SOLUTION SUBCUTANEOUS at 21:46

## 2020-10-13 ASSESSMENT — ENCOUNTER SYMPTOMS
DECREASED CONCENTRATION: 1
DYSPHORIC MOOD: 1

## 2020-10-13 ASSESSMENT — ACTIVITIES OF DAILY LIVING (ADL)
ORAL_HYGIENE: INDEPENDENT
HYGIENE/GROOMING: INDEPENDENT
DRESS: INDEPENDENT

## 2020-10-13 ASSESSMENT — MIFFLIN-ST. JEOR
SCORE: 1903.55
SCORE: 1923.05

## 2020-10-13 NOTE — SAFE
Brody Chung Propes  October 13, 2020    Due to pt's repeated pattern of seeking help at [facility] hospitals, intent to attend CD Tx and take his meds as Rx, but quickly returning to drug use upon d/c, along with his current intent for suicide, admission to  is again recommended at this time for safety and further planning. Pt does not appear to be malingering, but rather seems quite stuck in his addiction and situation in which he has no support and no stable living environment.  His fear of people being after him does appear to be delusional in nature, and potentially dangerous as he has indicated that he will defend himself from them if necessary.  It also appears that it may add to his difficulty in following through with CD Tx and other recommendations. Pt is in agreement with admission at this time.      Current Suicidal Ideation/Self-Injurious Concerns/Methods: Ingestion by OD, also stabbing self    Inappropriate Sexual Behavior: No    Aggression/Homicidal Ideation: Agitation/Hyperactivity      For additional details see full DEC assessment.       Javier Sams, LICSW

## 2020-10-13 NOTE — PHARMACY-ADMISSION MEDICATION HISTORY
Pharmacy Medication History  Admission medication history interview status for the 10/13/2020  admission is complete. See EPIC admission navigator for prior to admission medications       Medication history sources: Spoke w/ patient.  Reviewed recent discharge summary.   Location of interview: Phone  Medication history source reliability: Poor  Adherence assessment: Poor    Additional medication history information:   - Patient reports that he last took any of his medications ~ 1 week ago.      Medication reconciliation completed by provider prior to medication history? No    Time spent in this activity: 15 minutes      Prior to Admission medications    Medication Sig Last Dose Taking? Auth Provider   buPROPion (WELLBUTRIN SR) 100 MG 12 hr tablet Take 1 tablet (100 mg) by mouth 2 times daily  at ~ 1 week Yes Melita Dumont APRN CNP   gabapentin (NEURONTIN) 400 MG capsule Take 1 capsule (400 mg) by mouth 3 times daily  at ~ 1 week Yes Melita Dumont APRN CNP   insulin glargine (LANTUS PEN) 100 UNIT/ML pen Inject 15 Units Subcutaneous every 24 hours  at ~ 1 week Yes Laurence Serrano PA-C   liraglutide (VICTOZA) 18 MG/3ML solution Inject 0.6 mg Subcutaneous daily (with dinner)  at ~ 1 week Yes Laurence Serrano PA-C   alcohol swab prep pads Use to swab area of injection/nicci as directed.   Laurence Serrano PA-C   blood glucose (NO BRAND SPECIFIED) test strip Use to test blood sugar 4 times daily or as directed. To accompany: Blood Glucose Monitor Brands: per insurance.   Laurence Serrano PA-C   blood glucose calibration (NO BRAND SPECIFIED) solution To accompany: Blood Glucose Monitor Brands: per insurance.   Laurence Serrano PA-C   blood glucose monitoring (NO BRAND SPECIFIED) meter device kit Use to test blood sugar 4 times daily or as directed. Preferred blood glucose meter OR supplies to accompany: Blood Glucose Monitor Brands: per insurance.   Laurence Serrano PA-C   thin (NO BRAND  SPECIFIED) lancets Use with lanceting device. To accompany: Blood Glucose Monitor Brands: per insurance.   Laurence Serrano, PA-C     Mary Velarde, PharmD, BCPS

## 2020-10-13 NOTE — ED TRIAGE NOTES
Pt states he's suicidal today because there are people after him. Pt states these people are actually after him, though states he did meth yesterday. States he also does cocaine and marijuana. Pt states his son is on death row for murdering people and that's why these people are after him. Pt A&Ox4.

## 2020-10-13 NOTE — TELEPHONE ENCOUNTER
"S:  11:15 AM  Call from DEC  at [facility] ED  requesting IP MH bed for a 59 YO M    B:  Hx of MDD w/ sub ind psychosis.  Hx of SAs, last one via overdose.  Pt presented to  ED w/ SI-had a knife with him stating it was for protection  and for stabbing himself.   Pt  had  8 ED visits in September 2020 for numerous complaints.   Pt homeless, paranoid to go into any shelter or CD program because he  believes people are trying to kill him because his son is on death row.    Non compliant with insulin and psych meds, forgets to take while living on the streets and  told DEC   \"I just want to die\".     Utox POS for cocaine and meth    A:  voluntary    R:  Patient cleared and ready for behavioral bed placement: Yes     R:  2 PM  Patient accepted on 3B by Melita PUGA/Arsh.  Placed in 3B queue.  Tried to give charge nurse disposition, but she is unavailable and will call Intake back when able.  Call to [facility] ED with update.      "

## 2020-10-13 NOTE — ED NOTES
Placement by Dr. Caban at [address].  Intake will call back with phone number and placement time and permission to call report soon.

## 2020-10-13 NOTE — ED NOTES
Bed: ED18  Expected date:   Expected time:   Means of arrival:   Comments:  416 60M SI  cooperative  No COVID  ETA [id number]

## 2020-10-13 NOTE — ED PROVIDER NOTES
"  History     Chief Complaint:  Suicidal    HPI   Brody Colindres is a 60 year old male with a history of substance abuse, diabetes and diagnosis of schizophrenia, paranoia, depression and anxiety who presents to the emergency department for evaluation of ongoing suicidal ideation related to the quality of his life. The patient reports that he is feeling suicidal and has similar thoughts every day. He states that he \"is fed up with the way I live\" and specifically mentions wanting to quit doing drugs and not enjoying his life. Today the patient had his knife with him and indicates that he might have used this to commit suicide. Additionally, the patient reports smoking cocaine and methamphetamines yesterday. Additionally, he states that his son is currently on death row for killing someone and this has caused him significant anxiety. The patient states that the people related to the person his son killed are trying to get revenge by hurting him. He states that these people have been following him and know where he stays. The patient reports that he only tells his sister where he is staying and does not know how these people find him. This has been ongoing for the last 3 years, if not longer. The patient has been seen very frequently in the emergency department in the last 3 months and was recently admitted to a psychiatric bed on 09/25/2020. At that time he reported that he was feeling suicidal and planning to cut his wrists with a . The patient was discharged on 10/1/2020 with instructions to take Lantus, Wellbutrin, and Neurontin. The patient was discharged to residential CD treatment, however he apparently did not go. Today, the patient states that he has not taken any medications since he left the hospital and is currently homeless. He is afraid to stay in a shelter because he believes the people chasing him with find him. The patient otherwise denies any homicidal ideation.     Allergies:  No Known " "Drug Allergies    Medications:    Wellbutrin  Neurontin  Lantus  Victoza    Past Medical History:    Anxiety  Depressive disorder  Diabetes  Schizophrenia  Suicidal ideation  Cocaine use  Methamphetamine abuse  Hypertension  Diabetic neuropathy  Tobacco dependence  Homeless  Paranoia    Past Surgical History:    The patient does not have any pertinent past surgical history.    Family History:    No past pertinent family history.    Social History:  The patient presents alone  Smoking Status: Heavy tobacco smoker  Smokeless Tobacco: Never  Alcohol Use: Not currenly  Drug Use: Yes; cocaine, methamphetamines, marijuana  Marital Status:  Single      Review of Systems   Psychiatric/Behavioral: Positive for decreased concentration, dysphoric mood and suicidal ideas. Negative for self-injury.   All other systems reviewed and are negative.        Physical Exam   Vitals:  Patient Vitals for the past 24 hrs:   BP Temp Temp src Pulse Resp SpO2 Height Weight   10/13/20 1220 (!) 166/99 -- -- 91 -- 100 % -- --   10/13/20 0910 (!) 176/91 -- -- 97 -- 99 % -- --   10/13/20 0908 (!) 173/105 98  F (36.7  C) Oral 92 20 99 % 1.854 m (6' 1\") 103 kg (227 lb)       Physical Exam  Nursing note and vitals reviewed.  Constitutional:  Oriented to person, place, and time. Cooperative.   HENT:   Nose:    Nose normal.   Mouth/Throat:   Mucous membranes are normal.   Eyes:    Conjunctivae normal and EOM are normal.      Pupils are equal, round, and reactive to light.   Neck:    Trachea normal.   Cardiovascular:  Normal rate, regular rhythm, normal heart sounds and normal pulses. No murmur heard.  Pulmonary/Chest:  Effort normal and breath sounds normal.   Abdominal:   Soft. Normal appearance and bowel sounds are normal.      There is no tenderness.      There is no rebound and no CVA tenderness.   Musculoskeletal:  Extremities atraumatic x 4.   Lymphadenopathy:  No cervical adenopathy.   Neurological:   Alert and oriented to person, place, and " time. Normal strength.      No cranial nerve deficit or sensory deficit. GCS eye subscore is 4. GCS verbal subscore is 5. GCS motor subscore is 6.   Skin:    Skin is intact. No rash noted.   Psychiatric:   Depressed affect with ongoing suicidal thoughts as well as paranoia present.  Denies any homicidal ideation.        Emergency Department Course   Laboratory:  Drug abuse screen 77 urine: Amphetamines positive, Cocaine positive o/w negative     Glucose by Meter (Collected 1201): 185 (H)     Asymptomatic COVID-19 Virus by PCR: Pending    Interventions:  1109 Atarax 50 mg PO    Emergency Department Course:  Past medical records, nursing notes, and vitals reviewed.    0953 I performed an exam of the patient as documented above.     The patient provided a urine sample here in the emergency department. This was sent for laboratory testing, findings above.    2182 I spoke with Brittnee from DEC, regarding the patient.     9384 I was informed by DEC that the patient will be admitted to [facility]     The patient will be transferred to [facility] via EMS. DEC discussed the case with Dr. Caban, who will admit the patient to a monitored bed for further monitoring, evaluation, and treatment.      Impression & Plan      Covid-19  Brody Sheldontimmy Colindres was evaluated during a global COVID-19 pandemic, which necessitated consideration that the patient might be at risk for infection with the SARS-CoV-2 virus that causes COVID-19.   Applicable protocols for evaluation were followed during the patient's care.   COVID-19 was considered as part of the patient's evaluation. The plan for testing is:  a test was obtained during this visit.     Medical Decision Making:  This is a 60-year-old male with some significant mental health and substance abuse issues, who is also currently homeless, who came in for recurrent thoughts of self-harm and wanting to improve his life.  Unfortunately, he continues to not follow through with plans that are provided to  him including admission to the hospital, medications, and CD treatment.  I subsequently had Brittnee, our DEC , speak with the patient as well.  She is in agreement that he might benefit again from admission to see if we can get him the help that he is requesting as well as to hopefully get him to comply with the recommendations.  This also is what the patient wants.  We were able to find a bed for him at [facility] under the care of Dr. Caban.  All of the appropriate paperwork has been filled out.    Diagnosis:    ICD-10-CM    1. Paranoia (H)  F22    2. Suicidal thoughts  R45.851    3. Polysubstance abuse (H)  F19.10    4. Personal history of noncompliance with medical treatment, presenting hazards to health  Z91.19        Disposition:  Transferred to [facility] for admission to a psychiatric bed.       I, Charles Luther, am serving as a scribe on 10/13/2020 at 9:57 AM to personally document services performed by Jimmy Brownlee MD based on my observations and the provider's statements to me.    Charles Luther  10/13/2020   [facility] EMERGENCY DEPT       Jimmy Brownlee MD  10/13/20 3250

## 2020-10-14 LAB
ANION GAP SERPL CALCULATED.3IONS-SCNC: 6 MMOL/L (ref 3–14)
BUN SERPL-MCNC: 11 MG/DL (ref 7–30)
CALCIUM SERPL-MCNC: 8.9 MG/DL (ref 8.5–10.1)
CHLORIDE SERPL-SCNC: 105 MMOL/L (ref 94–109)
CO2 SERPL-SCNC: 24 MMOL/L (ref 20–32)
CREAT SERPL-MCNC: 0.97 MG/DL (ref 0.66–1.25)
ERYTHROCYTE [DISTWIDTH] IN BLOOD BY AUTOMATED COUNT: 12.7 % (ref 10–15)
GFR SERPL CREATININE-BSD FRML MDRD: 86 ML/MIN/{1.73_M2}
GLUCOSE BLDC GLUCOMTR-MCNC: 103 MG/DL (ref 70–99)
GLUCOSE BLDC GLUCOMTR-MCNC: 136 MG/DL (ref 70–99)
GLUCOSE BLDC GLUCOMTR-MCNC: 161 MG/DL (ref 70–99)
GLUCOSE BLDC GLUCOMTR-MCNC: 175 MG/DL (ref 70–99)
GLUCOSE BLDC GLUCOMTR-MCNC: 208 MG/DL (ref 70–99)
GLUCOSE SERPL-MCNC: 144 MG/DL (ref 70–99)
HCT VFR BLD AUTO: 40.4 % (ref 40–53)
HGB BLD-MCNC: 13.8 G/DL (ref 13.3–17.7)
LABORATORY COMMENT REPORT: NORMAL
MAGNESIUM SERPL-MCNC: 2.1 MG/DL (ref 1.6–2.3)
MCH RBC QN AUTO: 29.9 PG (ref 26.5–33)
MCHC RBC AUTO-ENTMCNC: 34.2 G/DL (ref 31.5–36.5)
MCV RBC AUTO: 87 FL (ref 78–100)
PHOSPHATE SERPL-MCNC: 4.1 MG/DL (ref 2.5–4.5)
PLATELET # BLD AUTO: 242 10E9/L (ref 150–450)
POTASSIUM SERPL-SCNC: 4 MMOL/L (ref 3.4–5.3)
RBC # BLD AUTO: 4.62 10E12/L (ref 4.4–5.9)
SARS-COV-2 RNA SPEC QL NAA+PROBE: NEGATIVE
SODIUM SERPL-SCNC: 135 MMOL/L (ref 133–144)
SPECIMEN SOURCE: NORMAL
WBC # BLD AUTO: 7.1 10E9/L (ref 4–11)

## 2020-10-14 PROCEDURE — 84100 ASSAY OF PHOSPHORUS: CPT | Performed by: NURSE PRACTITIONER

## 2020-10-14 PROCEDURE — 99221 1ST HOSP IP/OBS SF/LOW 40: CPT | Mod: AI | Performed by: NURSE PRACTITIONER

## 2020-10-14 PROCEDURE — 99222 1ST HOSP IP/OBS MODERATE 55: CPT | Performed by: NURSE PRACTITIONER

## 2020-10-14 PROCEDURE — 124N000003 HC R&B MH SENIOR/ADOLESCENT

## 2020-10-14 PROCEDURE — 250N000013 HC RX MED GY IP 250 OP 250 PS 637: Performed by: NURSE PRACTITIONER

## 2020-10-14 PROCEDURE — 99207 PR CONSULT E&M CHANGED TO INITIAL LEVEL: CPT | Performed by: NURSE PRACTITIONER

## 2020-10-14 PROCEDURE — 85027 COMPLETE CBC AUTOMATED: CPT | Performed by: NURSE PRACTITIONER

## 2020-10-14 PROCEDURE — 83735 ASSAY OF MAGNESIUM: CPT | Performed by: NURSE PRACTITIONER

## 2020-10-14 PROCEDURE — 999N001017 HC STATISTIC GLUCOSE BY METER IP

## 2020-10-14 PROCEDURE — 99207 PR DOWN CODE DUE TO SUBSEQUENT EXAM: CPT | Performed by: NURSE PRACTITIONER

## 2020-10-14 PROCEDURE — 36415 COLL VENOUS BLD VENIPUNCTURE: CPT | Performed by: NURSE PRACTITIONER

## 2020-10-14 PROCEDURE — 80048 BASIC METABOLIC PNL TOTAL CA: CPT | Performed by: NURSE PRACTITIONER

## 2020-10-14 RX ADMIN — GABAPENTIN 400 MG: 400 CAPSULE ORAL at 08:16

## 2020-10-14 RX ADMIN — GABAPENTIN 400 MG: 400 CAPSULE ORAL at 14:00

## 2020-10-14 RX ADMIN — INSULIN GLARGINE 15 UNITS: 100 INJECTION, SOLUTION SUBCUTANEOUS at 20:25

## 2020-10-14 RX ADMIN — GABAPENTIN 400 MG: 400 CAPSULE ORAL at 20:25

## 2020-10-14 RX ADMIN — LIRAGLUTIDE 0.6 MG: 6 INJECTION SUBCUTANEOUS at 17:34

## 2020-10-14 ASSESSMENT — ACTIVITIES OF DAILY LIVING (ADL)
LAUNDRY: UNABLE TO COMPLETE
DRESS: INDEPENDENT
HYGIENE/GROOMING: INDEPENDENT
ORAL_HYGIENE: INDEPENDENT

## 2020-10-14 NOTE — PROGRESS NOTES
Work Completed:Reviewed chart. Met with pt to complete psychosocial and PPC. Pt would like referral to CD treatment. Spoke with CD  at [facility] who reports that the [facility] is still good and does not need to be updated. Left voice message for [facility] to determine availability.    Discharge plan or goal: Referral for CD treatment                Barriers to discharge: Needs mental health stabilization

## 2020-10-14 NOTE — PROGRESS NOTES
"SPIRITUAL HEALTH SERVICES: Tele-Encounter  Patient Location [address]  Spoke with Patient      Referral Source:  Request    patient was appropriately screened for telechaplaincy support with bedside nurse prior to visit     DATA:  Visited with pt Brody, who described the circumstances with his son, and that people are, \"coming to get me because of what my son did.\"  He said he is always having to move because he doesn't feel safe.  He believes he's safe here and is hopeful the same will be true at the treatment center he's going to. Pt identified as Adventism and welcomed prayer.       PLAN:  I will continue to follow the patient while he's on this unit.    Chaplain Fatimah Junior    ______________________________    Type of service:  Telephone Visit     has received verbal consent for a TelephoneVisit from the patient? Yes    Distance Provider Location: designated [place] office or home office (secure setting)    Mode of Communication: telephone (via [employer] phone or [employer] tele-call-number ([phone]))      "

## 2020-10-14 NOTE — PROGRESS NOTES
"Initial Psychosocial Assessment     I have reviewed the chart, met with the patient, and developed Care Plan.  Information for assessment was obtained from patient and chart notes.      Pt is voluntary    Presenting Problem:  Patient is a 60 year male who presents to [facility]. Pt has history of MDD w/ sub ind psychosis.  Pt has a history of  SAs, last one via overdose.  Pt presented to  ED w/ SI. He had a knife with him stating it was  for protection and for stabbing himself.    Pt has had  8 ED visits in 2020 for numerous complaints.   Pt is homeless and paranoid to go into any shelter or CD program because he  believes people are trying to kill him because his son is on death row.  Pt is non compliant with insulin and psych meds, forgets to take while living on the streets and  told DEC   \"I just want to die\".  Utox POS for cocaine and meth  History of Mental Health and Chemical Dependency:  Patient has a history of MDD, EDWIN, possible substance induced psychosis.  Prior admission at [facility] in 2019.  [facility].  Most recent stay at this facility at the end of . Patient has in the past discharged early and not followed through with aftercare recommendations.       Family Description (Constellation, Family Psychiatric History):  Patient grew up with his parents in [place].  He has five siblings.  Parents and one sibling .  Patient has 4 adult children and 10 grandchildren, all in [place].  He has never been  and has no contact with his family at this time. Pt declined to sign TENZIN.     Significant Life Events (Illness, Abuse, Trauma, Death):  Patient indicates his 36 year old son is on death row in [place].     Living Situation:  Homeless, living on the streets and sometimes using shelters.     Educational Background:  GED     Occupational History:  Not employed, disabled.     Financial Status:  SSDI     Legal Issues:  Patient has a history of " incarceration.     Ethnic/Cultural Issues:  Patient is Black.     Spiritual Orientation:  None identified      Service History:  None      Social Functioning (organization, interests):  Not assessed     Current Treatment Providers are:  Medication management  [facility]   [phone]      Social Service Assessment/Plan:  Met with pt who appeared quiet and guarded. He reports that he would like to be referred to CD treatment.    Hospital staff will provide a safe environment and a therapeutic milieu. Pt will have psychiatric assessment and medication management by the psychiatrist. CTC will do individual inpatient treatment planning and after care planning. Staff will continue to assess pt as needed. Patient will participate in unit groups and activities. Pt will receive individual and group support on the unit.      Patient admitted for safety/stabilization of mood disorder sx's.  Medication will be reviewed, adjusted per MD's as indicated.   Will contact outpatient providers for care coordination.  Will discuss options for increased community supports.  Will continue to assess, coordinate care, and ensure appropriate f/u care is in place

## 2020-10-14 NOTE — PLAN OF CARE
Problem: General Plan of Care (Inpatient Behavioral)  Goal: Individualization/Patient Specific Goal (IP Behavioral)  Description: The patient and/or their representative will achieve their patient-specific goals related to the plan of care.    The patient-specific goals include:  Flowsheets (Taken 10/14/2020 0803)  Patient Strengths: Awareness of substance use issues  Note: Reasons you are in the hospital:  1)  Suicidal thoughts    Goals for Discharge:  1)  I would like CD treatment

## 2020-10-14 NOTE — PROGRESS NOTES
"CLINICAL NUTRITION SERVICES - ASSESSMENT NOTE     Nutrition Prescription    RECOMMENDATIONS FOR MDs/PROVIDERS TO ORDER:  None today    Malnutrition Status:    Unable to determine due to no NFPA completed     Recommendations already ordered by Registered Dietitian (RD):  Diet education  Double portions of meat    Future/Additional Recommendations:  Monitor intakes and wt  Snacks per pt preference/request    Unable to obtain in-person nutrition history or nutrition focused physical assessment (NFPA) from patient to limit exposure and to minimize use of PPE during COVID-19 pandemic. Spoke to pt over the phone.     REASON FOR ASSESSMENT  Brody Vang is a 60 year old male assessed by the dietitian for Admission Nutrition Risk Screen for new/uncontrolled diabetes  PMH significant for depression, DM, schizophrenia and homelessness  admitted for substance induced paranoia.   NUTRITION HISTORY  Pt reports UBW of 237#, currently 230#. Stated he lost wt over the past couple of weeks d/t \"living on the streets\". Panhandles for money, usually able to earn enough for 2 meals/day which he buys from restaurants.      CURRENT NUTRITION ORDERS  Diet: Moderate Consistent Carbohydrate  Intake/Tolerance: Pt stated appetite has been good since admit. No c/o N/V/C/D or chewing/swallowing difficulty. Pt requesting double portions, order placed for double portions of meat d/t CHO restriction. Briefly discussed DM diet however pt has an inconsistent source of food. Encouraged pt to choose lower CHO foods when he has choices but not be restrictive if he is having difficulty obtaining food.     LABS  Labs reviewed  Results for BRODY VANG (MRN [id number]) as of 10/14/2020 14:44   Ref. Range 9/28/2020 07:25   Hemoglobin A1C Latest Ref Range: 0 - 5.6 % 7.7 (H)     MEDICATIONS  Medications reviewed:  lantus  victoza  PRN: maalox, dulcolax, MOM    ANTHROPOMETRICS  Height: 185.4 cm (6' 1\")  Most Recent Weight: 104.9 kg (231 lb 4.8 " "oz)    IBW: 83.6 kg  BMI: 30.52 kg/m^2, Obesity Grade I BMI 30-34.9  Weight History: LUK=113#, most recently \"a couple\" weeks ago per pt. 8# (3.3%) wt loss in 1 month. 33# (12.5%) wt loss in 6 months, possibly d/t scale error on 4/12  Wt Readings from Last 10 Encounters:   10/13/20 104.9 kg (231 lb 4.8 oz)   10/13/20 103 kg (227 lb)   10/01/20 105.6 kg (232 lb 11.2 oz)   09/02/20 108.4 kg (239 lb)   08/10/20 104.8 kg (231 lb)   05/16/20 102 kg (224 lb 13.9 oz)   04/21/20 99.8 kg (220 lb)   04/12/20 120 kg (264 lb 8.8 oz)   04/07/19 106.7 kg (235 lb 3.2 oz)   04/06/19 111.6 kg (246 lb)     Dosing Weight: 88.9 kg (adjusted wt using current wt of 104.9 kg and ideal wt of 83.6 kg)     ASSESSED NUTRITION NEEDS  Estimated Energy Needs: 2418-4877 kcals/day (20 - 25 kcals/kg)  Justification: Obese  Estimated Protein Needs:  grams protein/day (1 - 1.2 grams of pro/kg)  Justification: Obesity guidelines  Estimated Fluid Needs: 1778-222 mL/day (1 mL/kcal)   Justification: Maintenance or Per provider pending fluid status    PHYSICAL FINDINGS  See malnutrition section below.    MALNUTRITION  % Intake: Decreased intake does not meet criteria  % Weight Loss: > 10% in 6 months (severe)  Subcutaneous Fat Loss: Unable to assess  Muscle Loss: Unable to assess  Fluid Accumulation/Edema: None noted  Malnutrition Diagnosis: Unable to determine due to no NFPA completed     NUTRITION DIAGNOSIS  Predicted undesirable food choices related to lack of access to food AEB pt report and a1c of 7.7..   Predicted inadequate oral intakes related to lack of access to food as evidenced by pt reported intakes/wt loss.       INTERVENTIONS  Implementation  Nutrition Education: Discussed role of RD, menu ordering and available snacks/supplements. Briefly discussed DM diet and choosing lower CHO foods as able.    Double portions of meat   Pt to discuss available programs for food assistance with SW     Goals  Patient to consume % of " nutritionally adequate meal trays TID, or the equivalent with supplements/snacks.     Monitoring/Evaluation  Progress toward goals will be monitored and evaluated per protocol.    Jonelle Hare MS, RD, LDN

## 2020-10-14 NOTE — PROVIDER NOTIFICATION
"   10/14/20 1620   Sitting Orthostatic BP   Sitting Orthostatic /74   Sitting Orthostatic Pulse 76 bpm   Standing Orthostatic BP   Standing Orthostatic BP 87/57   Standing Orthostatic Pulse 76 bpm     IM notified of ongoing orthostatic hypotension today. Ernestina Latham CNP ordered IV bolus of NS 1L and stat labs. Advised that pt be encouraged to drink fluids. Behavioral RN troubleshooter paged to place IV and start bag.    ADDENDUM  When approached about IV, pt stated \"I hate needles. They just poked me for blood. I'm going to refuse. I'm OK, I'm not dizzy. I don't need that.\" Pt verbalized agreement to take lots of fluids and to have BP rechecked at 2000.  "
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
## 2020-10-14 NOTE — PROGRESS NOTES
10/13/20 2202   Patient Belongings   Belongings Search Yes   Clothing Search Yes   Second Staff Stephanie [facility]       In locker:  -black hoodie  -teal hoodie  -black socks  -white tank top  -gray t shirt  -plaid scarf  -back stocking hat  -black tennis shoes  -  -blue jeans  -sleeping bag  -blanket  -black gloves    (Items in backpack):  -toiletries  -masks  -multiple eye glasses  -razors  -hand   -lighter  -notepads  -sharpies  -flash light  -small bible  -charging cord  -screw   -nail grooming kit  -small book  -4 stocking caps  -headphones  -socks  -more masks  -umbrella  -wipes  -hat  -lotion  -wallet      Contraband bag to security #[id number]:  -knife    To security #[id number]:  -[place]  -social security card  -- [phone]  - [phone]    To secuirty #[id number]:   Lancets in a bag  1 box accu check strips  1 bag alcohol wipes and misc    To security #[id number]:   1 bottle of gabapentin  1 bottle of wellbutrin        A               Admission:  I am responsible for any personal items that are not sent to the safe or pharmacy.  [facility] is not responsible for loss, theft or damage of any property in my possession.    Signature:  _________________________________ Date: _______  Time: _____                                              Staff Signature:  ____________________________ Date: ________  Time: _____      2nd Staff person, if patient is unable/unwilling to sign:    Signature: ________________________________ Date: ________  Time: _____     Discharge:  [facility] has returned all of my personal belongings:    Signature: _________________________________ Date: ________  Time: _____                                          Staff Signature:  ____________________________ Date: ________  Time: _____

## 2020-10-14 NOTE — PROGRESS NOTES
Brief Medicine Note  14 October 2020     Brody Colindres is a 60 year old male with past medical history significant for schizophrenia, paranoia, polysubstance abuse, type 2 DM, depression, and anxiety admitted for ongoing SI.      Formal consult note to follow in AM.  Resume PTA Lantus 15 units daily, Victoza 0.6mg with dinner, and BG checks ac/hs.  Noted to have orthostatic hypotension today.  Pt denies symptoms, no lightheadedness, dizziness, or chest pain.  Declining 1L bolus, aggreable to drink fluids.  Lytes wnl.

## 2020-10-14 NOTE — PLAN OF CARE
"Patient admitted from [facility] ER.  Admitted for SI and paranoia,substance induced.  Patient is homeless and has a recent admission to [facility] in this hospital.  Alert, oriented x4. Rates depression and anxiety at 10/10 and states he remains suicidal with a plan to overdose on pills.  Contracts for safety and is voluntary.  Patient has not made any paranoid statements since admission.States he has a good appetite and his sleep is adequate.  Denies ETOH abuse but admits to street drugs, last use was yesterday.  Calm, quiet, cooperative.  patient states he has a history of aggression when he becomes upset and when asked if he would strike out at someone while he was here he was unsure but stated \"it depends on the situation\".  BG was 330 on admission.  Insulin ordered and will give on arrival.  Repeat BG will be done.  Repeat BG is now 261.  Patient has not taken his insulin or Wellbutrin for at least a week but has been taking gabapentin daily.  Wellbutrin was not ordered and will need to be evaluated in the morning after MD sees the patient.    P:  Monitor patient closely.  "

## 2020-10-14 NOTE — PLAN OF CARE
Problem: General Plan of Care (Inpatient Behavioral)  Goal: Team Discussion  Description: Team Plan:  Note: BEHAVIORAL TEAM DISCUSSION    Participants: AILYN Estrada DNP, Paty Serna RN, MICHELLE Randall, Alena Slater, OT  Progress: New admit  Anticipated length of stay: 5-7 days  Continued Stay Criteria/Rationale: Suicidal ideation, substance abuse disorder  Medical/Physical: See medical notes  Precautions:   Behavioral Orders   Procedures     Assault precautions     Code 1 - Restrict to Unit     Routine Programming     As clinically indicated     Status 15     Every 15 minutes.     Suicide precautions     Patients on Suicide Precautions should have a Combination Diet ordered that includes a Diet selection(s) AND a Behavioral Tray selection for Safe Tray - with utensils, or Safe Tray - NO utensils       Plan: The plan is to assess the patient for mental health and medication needs.  The patient will be prescribed medications to treat the identified symptoms.  Upon discharge the patient will be referred to services as appropriate based on the assessment.  Rationale for change in precautions or plan: N/A

## 2020-10-14 NOTE — PHARMACY-ADMISSION MEDICATION HISTORY
Admission Medication History Completed by Pharmacy    Medication history completed by Margarito Velarde, PharmD, BCPS at Pharmacy [facility] on 10/13 @14:48.     Prior to Admission medications    Medication Sig Last Dose Taking? Auth Provider   buPROPion (WELLBUTRIN SR) 100 MG 12 hr tablet Take 1 tablet (100 mg) by mouth 2 times daily Past Week at Unknown time Yes Melita Dmuont APRN CNP   gabapentin (NEURONTIN) 400 MG capsule Take 1 capsule (400 mg) by mouth 3 times daily Past Week at Unknown time Yes Melita Dumont APRN CNP   insulin glargine (LANTUS PEN) 100 UNIT/ML pen Inject 15 Units Subcutaneous every 24 hours Past Week at Unknown time Yes Laurence Serrano PA-C   liraglutide (VICTOZA) 18 MG/3ML solution Inject 0.6 mg Subcutaneous daily (with dinner) Past Week at Unknown time Yes Laurence Serrano PA-C   alcohol swab prep pads Use to swab area of injection/nicci as directed.   Laurence Serrano PA-C   blood glucose (NO BRAND SPECIFIED) test strip Use to test blood sugar 4 times daily or as directed. To accompany: Blood Glucose Monitor Brands: per insurance.   Laurence Serrano PA-C   blood glucose calibration (NO BRAND SPECIFIED) solution To accompany: Blood Glucose Monitor Brands: per insurance.   Laurence Serrano PA-C   blood glucose monitoring (NO BRAND SPECIFIED) meter device kit Use to test blood sugar 4 times daily or as directed. Preferred blood glucose meter OR supplies to accompany: Blood Glucose Monitor Brands: per insurance.   Laurence Serrano PA-C   thin (NO BRAND SPECIFIED) lancets Use with lanceting device. To accompany: Blood Glucose Monitor Brands: per insurance.   Laurence Serrano PA-C       Date completed: 10/13/20    Medication history completed by: Vy Akhtar

## 2020-10-14 NOTE — PROGRESS NOTES
"Flat affect, mood calm. Pt showered. Pt given urine specimen cup and educated on need for a UA, pt verbalized understanding. Pt reports SI with plan to overdose on pills, pt contracts for safety while on unit.     Pt reports the bandage on the middle finger of his left hand covers \"5 stitches\", IM aware, stitches placed on 10/08/2020 with discharge instructions to come back in 7-10 days for suture removal. Medical flyer will come to assess and change dressing.     , postprandial BG    Pt has orthostatic hypotension, pt denies feeling dizzy or lightheaded, Pt educated on changing positions slowly and staying hydrated, pt verbalized understanding. Fluids encouraged.   "

## 2020-10-15 LAB
GLUCOSE BLDC GLUCOMTR-MCNC: 103 MG/DL (ref 70–99)
GLUCOSE BLDC GLUCOMTR-MCNC: 114 MG/DL (ref 70–99)
GLUCOSE BLDC GLUCOMTR-MCNC: 164 MG/DL (ref 70–99)
GLUCOSE BLDC GLUCOMTR-MCNC: 165 MG/DL (ref 70–99)
GLUCOSE BLDC GLUCOMTR-MCNC: 168 MG/DL (ref 70–99)

## 2020-10-15 PROCEDURE — 999N001017 HC STATISTIC GLUCOSE BY METER IP

## 2020-10-15 PROCEDURE — 250N000013 HC RX MED GY IP 250 OP 250 PS 637: Performed by: NURSE PRACTITIONER

## 2020-10-15 PROCEDURE — 99233 SBSQ HOSP IP/OBS HIGH 50: CPT | Performed by: NURSE PRACTITIONER

## 2020-10-15 PROCEDURE — 124N000003 HC R&B MH SENIOR/ADOLESCENT

## 2020-10-15 RX ORDER — LIRAGLUTIDE 6 MG/ML
0.6 INJECTION SUBCUTANEOUS
Qty: 3 ML | Refills: 1 | Status: ON HOLD | OUTPATIENT
Start: 2020-10-15 | End: 2020-11-18

## 2020-10-15 RX ORDER — LIRAGLUTIDE 6 MG/ML
0.6 INJECTION SUBCUTANEOUS
Qty: 3 ML | Refills: 1 | Status: SHIPPED | OUTPATIENT
Start: 2020-10-15 | End: 2020-10-15

## 2020-10-15 RX ADMIN — GABAPENTIN 400 MG: 400 CAPSULE ORAL at 14:30

## 2020-10-15 RX ADMIN — INSULIN GLARGINE 15 UNITS: 100 INJECTION, SOLUTION SUBCUTANEOUS at 20:21

## 2020-10-15 RX ADMIN — FLUOXETINE 20 MG: 20 CAPSULE ORAL at 08:04

## 2020-10-15 RX ADMIN — GABAPENTIN 400 MG: 400 CAPSULE ORAL at 20:21

## 2020-10-15 RX ADMIN — NICOTINE 1 PATCH: 21 PATCH, EXTENDED RELEASE TRANSDERMAL at 08:04

## 2020-10-15 RX ADMIN — GABAPENTIN 400 MG: 400 CAPSULE ORAL at 08:04

## 2020-10-15 RX ADMIN — LIRAGLUTIDE 0.6 MG: 6 INJECTION SUBCUTANEOUS at 17:09

## 2020-10-15 ASSESSMENT — ACTIVITIES OF DAILY LIVING (ADL)
LAUNDRY: WITH SUPERVISION
DRESS: SCRUBS (BEHAVIORAL HEALTH)
ORAL_HYGIENE: INDEPENDENT
ORAL_HYGIENE: INDEPENDENT
LAUNDRY: WITH SUPERVISION
DRESS: SCRUBS (BEHAVIORAL HEALTH)
HYGIENE/GROOMING: INDEPENDENT
HYGIENE/GROOMING: INDEPENDENT

## 2020-10-15 NOTE — PLAN OF CARE
Pt is somewhat isolative to room this evening. Pt did attend community group only.  Pt states he is depressed and suicidal with no plan.  Pt rates depression 8/10 and denies anxiety.  Pt states he is homeless and doesn't have a job.  Pt states he doesn't really want talk about all the stressors he has.  Pt was able to contract for safety and feels safe here.  Pt is cooperative and ate well.  Pt denies hearing any voices.

## 2020-10-15 NOTE — PROGRESS NOTES
Order to remove sutures from left finger. Patient states he does not want sutures removed until Saturday. Sutures are clean, no sign of infection. Patient has been wearing a bandaid on finger. Bandaid changed at this time. Patients primary RN made aware that he does not want sutures removed until Sat. Will continue to monitor as needed.

## 2020-10-15 NOTE — PROGRESS NOTES
Work Completed: Spoke to intake staff at [facility] to determine if they can accept pt and if they have availability at this time. Was informed that they still have pt's Rule 25 on file and will accept him for placement. She will contact writer with date of admission. Met with pt to relay information to him. Pt became agitated as he reports that he does not wish to go to that facility due to fear of relapsing. Pt's provider spoke with pt and informed him that he either go to this facility or be discharged. Provider gave pt one hour to think about it.    Discharge plan or goal: Referral to CD treatment                Barriers to discharge: Mental health needs further stabilization

## 2020-10-15 NOTE — PROGRESS NOTES
Pt presents with blunted, flat affect and depressed, irritable mood. Reports passive SI with no plan, can be safe while in the hospital. Denies SIB and hallucinations. Spent most of the shift isolated to his room. Did come out for meals and to watch some television. Initially pt wanted to discharge because he was unhappy with going to [facility]. He reports that there is a lot of drug abuse that goes on there and he wants to really get clean. Pt then changed his mind stating that he would stay and go to that treatment facility. Pt can become irritable very quickly at times. VSS. Medication compliant. Orders placed for suture removal and medical flyer made aware. No other concerns or complaints at this time.

## 2020-10-16 LAB
GLUCOSE BLDC GLUCOMTR-MCNC: 100 MG/DL (ref 70–99)
GLUCOSE BLDC GLUCOMTR-MCNC: 118 MG/DL (ref 70–99)
GLUCOSE BLDC GLUCOMTR-MCNC: 127 MG/DL (ref 70–99)
GLUCOSE BLDC GLUCOMTR-MCNC: 148 MG/DL (ref 70–99)
GLUCOSE BLDC GLUCOMTR-MCNC: 91 MG/DL (ref 70–99)

## 2020-10-16 PROCEDURE — 99233 SBSQ HOSP IP/OBS HIGH 50: CPT | Performed by: NURSE PRACTITIONER

## 2020-10-16 PROCEDURE — 124N000003 HC R&B MH SENIOR/ADOLESCENT

## 2020-10-16 PROCEDURE — 250N000013 HC RX MED GY IP 250 OP 250 PS 637: Performed by: NURSE PRACTITIONER

## 2020-10-16 PROCEDURE — 999N001017 HC STATISTIC GLUCOSE BY METER IP

## 2020-10-16 RX ORDER — NALTREXONE HYDROCHLORIDE 50 MG/1
50 TABLET, FILM COATED ORAL DAILY
Qty: 30 TABLET | Refills: 0 | Status: SHIPPED | OUTPATIENT
Start: 2020-10-19 | End: 2020-11-04

## 2020-10-16 RX ORDER — NALTREXONE HYDROCHLORIDE 50 MG/1
50 TABLET, FILM COATED ORAL DAILY
Status: DISCONTINUED | OUTPATIENT
Start: 2020-10-19 | End: 2020-10-20 | Stop reason: HOSPADM

## 2020-10-16 RX ADMIN — INSULIN GLARGINE 15 UNITS: 100 INJECTION, SOLUTION SUBCUTANEOUS at 21:20

## 2020-10-16 RX ADMIN — NICOTINE 1 PATCH: 21 PATCH, EXTENDED RELEASE TRANSDERMAL at 08:12

## 2020-10-16 RX ADMIN — FLUOXETINE 20 MG: 20 CAPSULE ORAL at 08:08

## 2020-10-16 RX ADMIN — LIRAGLUTIDE 0.6 MG: 6 INJECTION SUBCUTANEOUS at 17:36

## 2020-10-16 RX ADMIN — GABAPENTIN 400 MG: 400 CAPSULE ORAL at 21:19

## 2020-10-16 RX ADMIN — Medication 25 MG: at 08:45

## 2020-10-16 RX ADMIN — GABAPENTIN 400 MG: 400 CAPSULE ORAL at 08:08

## 2020-10-16 RX ADMIN — GABAPENTIN 400 MG: 400 CAPSULE ORAL at 14:31

## 2020-10-16 ASSESSMENT — ACTIVITIES OF DAILY LIVING (ADL)
LAUNDRY: UNABLE TO COMPLETE
ORAL_HYGIENE: INDEPENDENT
DRESS: INDEPENDENT
HYGIENE/GROOMING: INDEPENDENT

## 2020-10-16 NOTE — PLAN OF CARE
"  Pt lying in bed much of shift. Appears irritable and withdrawn. On approach for BG assessment before dinner, he remained under covers without moving and responded to questions only with grunting sounds. Later, after dinner, he approached writer and stated he had several masks still in the laundry and requested them. When writer returned from washer & dryer and opened soiled utility room door to explain that they were in the washer and still wet, pt became suddenly hostile and began yelling and gesturing in an agitated manner. He stated he did not care if they were wet, did not want them dried, just wanted them now. He was asked to stop yelling and calm down; informed pt that this behavior was not acceptable. He stated \"man, whatever\" and waved his hand dismissively as he walked away. Charge RN and other unit staff informed and urged to use caution when interacting with pt. Monitor for further signs of aggression. Continue with current treatment plan and recommendations. Continue to monitor and reassess symptoms. Monitor response to medications. Monitor progress towards treatment goals. Encourage groups and participation.   "

## 2020-10-16 NOTE — PROGRESS NOTES
"[facility], [place]   Psychiatric Progress Note        Interim History:   From H&P: Brody Colindres is a 60 year old male with a history of substance abuse, diabetes and diagnosis of schizophrenia, paranoia, depression and anxiety who presents to the emergency department for evaluation of ongoing suicidal ideation related to the quality of his life. The patient reports that he is feeling suicidal and has similar thoughts every day. He states that he \"is fed up with the way I live\" and specifically mentions wanting to quit doing drugs and not enjoying his life. Today the patient had his knife with him and indicates that he might have used this to commit suicide. Additionally, the patient reports smoking cocaine and methamphetamines yesterday. Additionally, he states that his son is currently on death row for killing someone and this has caused him significant anxiety. The patient states that the people related to the person his son killed are trying to get revenge by hurting him. He states that these people have been following him and know where he stays..     Team meeting report: The patient's care was discussed with the treatment team during the daily team meeting and/or staff's chart notes were reviewed.  Staff report patient has been irritable in the morning, much calmer in the evening.  He is isolated to his room.  Comes out for meals only.  He did not attend any groups. Patient is eating well and taking medications as prescribed.  Continues to endorse passive suicidal ideation.  Slept all night.    Met with patient.  He is in the lounge eating breakfast.  He is smiling.  States he is feeling much better.  Depression and anxiety are declining.  He is no longer suicidal.  No hallucinations or paranoia.  Discussed the disposition.  The patient is not happy about going to [facility]  recovery however, understands that he does not have other choices.  Tentative discharge Monday or Tuesday.  " Medications have been ordered.         Medications:       sodium chloride 0.9%  1,000 mL Intravenous Once     FLUoxetine  20 mg Oral Daily     gabapentin  400 mg Oral TID     insulin glargine  15 Units Subcutaneous Q24H     liraglutide  0.6 mg Subcutaneous Daily with supper     nicotine  1 patch Transdermal Q24H     nicotine   Transdermal Q8H          Allergies:   No Known Allergies       Labs:     Recent Results (from the past 672 hour(s))   Alcohol breath test POCT    Collection Time: 09/25/20  3:22 AM   Result Value Ref Range    Alcohol Breath Test 0.000 0.00 - 0.01   Asymptomatic COVID-19 Virus (Coronavirus) by PCR    Collection Time: 09/25/20  3:03 PM    Specimen: Nasopharyngeal   Result Value Ref Range    COVID-19 Virus PCR to [facility] - Source Nasopharyngeal     COVID-19 Virus PCR to [facility] - Result       Test received-See reflex to IDDL test SARS CoV2 (COVID-19) Virus RT-PCR   SARS-CoV-2 COVID-19 Virus (Coronavirus) RT-PCR Nasopharyngeal    Collection Time: 09/25/20  3:03 PM    Specimen: Nasopharyngeal   Result Value Ref Range    SARS-CoV-2 Virus Specimen Source Nasopharyngeal     SARS-CoV-2 PCR Result NEGATIVE     SARS-CoV-2 PCR Comment       Testing was performed using the Simplexa COVID-19 Direct Assay on the [employer] Liaison MDX   instrument. Additional information about this Emergency Use Authorization (EUA) assay can   be found via the Lab Guide.     Drug abuse screen 6 urine (chem dep)    Collection Time: 09/25/20  4:43 PM   Result Value Ref Range    Amphetamine Qual Urine Positive (A) NEG^Negative    Barbiturates Qual Urine Negative NEG^Negative    Benzodiazepine Qual Urine Negative NEG^Negative    Cannabinoids Qual Urine Negative NEG^Negative    Cocaine Qual Urine Positive (A) NEG^Negative    Ethanol Qual Urine Negative NEG^Negative    Opiates Qualitative Urine Negative NEG^Negative   Glucose by meter    Collection Time: 09/25/20  6:03 PM   Result Value Ref Range    Glucose 255 (H) 70 - 99 mg/dL    Glucose by meter    Collection Time: 09/26/20  7:59 AM   Result Value Ref Range    Glucose 182 (H) 70 - 99 mg/dL   Glucose by meter    Collection Time: 09/26/20 11:28 AM   Result Value Ref Range    Glucose 136 (H) 70 - 99 mg/dL   Glucose by meter    Collection Time: 09/26/20  3:52 PM   Result Value Ref Range    Glucose 197 (H) 70 - 99 mg/dL   Glucose by meter    Collection Time: 09/27/20  8:03 AM   Result Value Ref Range    Glucose 258 (H) 70 - 99 mg/dL   Glucose by meter    Collection Time: 09/27/20 12:12 PM   Result Value Ref Range    Glucose 151 (H) 70 - 99 mg/dL   Glucose by meter    Collection Time: 09/27/20  4:30 PM   Result Value Ref Range    Glucose 261 (H) 70 - 99 mg/dL   Lipid Profile    Collection Time: 09/28/20  7:25 AM   Result Value Ref Range    Cholesterol 133 <200 mg/dL    Triglycerides 95 <150 mg/dL    HDL Cholesterol 54 >39 mg/dL    LDL Cholesterol Calculated 60 <100 mg/dL    Non HDL Cholesterol 79 <130 mg/dL   CBC with platelets differential    Collection Time: 09/28/20  7:25 AM   Result Value Ref Range    WBC 5.5 4.0 - 11.0 10e9/L    RBC Count 4.55 4.4 - 5.9 10e12/L    Hemoglobin 14.1 13.3 - 17.7 g/dL    Hematocrit 40.2 40.0 - 53.0 %    MCV 88 78 - 100 fl    MCH 31.0 26.5 - 33.0 pg    MCHC 35.1 31.5 - 36.5 g/dL    RDW 12.7 10.0 - 15.0 %    Platelet Count 223 150 - 450 10e9/L    Diff Method Automated Method     % Neutrophils 44.3 %    % Lymphocytes 39.9 %    % Monocytes 9.8 %    % Eosinophils 4.9 %    % Basophils 0.7 %    % Immature Granulocytes 0.4 %    Nucleated RBCs 0 0 /100    Absolute Neutrophil 2.4 1.6 - 8.3 10e9/L    Absolute Lymphocytes 2.2 0.8 - 5.3 10e9/L    Absolute Monocytes 0.5 0.0 - 1.3 10e9/L    Absolute Eosinophils 0.3 0.0 - 0.7 10e9/L    Absolute Basophils 0.0 0.0 - 0.2 10e9/L    Abs Immature Granulocytes 0.0 0 - 0.4 10e9/L    Absolute Nucleated RBC 0.0    Comprehensive metabolic panel    Collection Time: 09/28/20  7:25 AM   Result Value Ref Range    Sodium 139 133 - 144 mmol/L     Potassium 4.2 3.4 - 5.3 mmol/L    Chloride 108 94 - 109 mmol/L    Carbon Dioxide 27 20 - 32 mmol/L    Anion Gap 4 3 - 14 mmol/L    Glucose 134 (H) 70 - 99 mg/dL    Urea Nitrogen 12 7 - 30 mg/dL    Creatinine 0.96 0.66 - 1.25 mg/dL    GFR Estimate 87 >60 mL/min/[1.73_m2]    GFR Estimate If Black >90 >60 mL/min/[1.73_m2]    Calcium 8.6 8.5 - 10.1 mg/dL    Bilirubin Total 0.4 0.2 - 1.3 mg/dL    Albumin 3.2 (L) 3.4 - 5.0 g/dL    Protein Total 7.3 6.8 - 8.8 g/dL    Alkaline Phosphatase 59 40 - 150 U/L    ALT 16 0 - 70 U/L    AST 12 0 - 45 U/L   TSH with free T4 reflex    Collection Time: 09/28/20  7:25 AM   Result Value Ref Range    TSH 0.64 0.40 - 4.00 mU/L   Hemoglobin A1c    Collection Time: 09/28/20  7:25 AM   Result Value Ref Range    Hemoglobin A1C 7.7 (H) 0 - 5.6 %   Glucose by meter    Collection Time: 09/28/20  8:08 AM   Result Value Ref Range    Glucose 174 (H) 70 - 99 mg/dL   Glucose by meter    Collection Time: 09/28/20 12:26 PM   Result Value Ref Range    Glucose 113 (H) 70 - 99 mg/dL   Glucose by meter    Collection Time: 09/28/20  5:12 PM   Result Value Ref Range    Glucose 224 (H) 70 - 99 mg/dL   Glucose by meter    Collection Time: 09/28/20 10:12 PM   Result Value Ref Range    Glucose 247 (H) 70 - 99 mg/dL   Glucose by meter    Collection Time: 09/29/20  8:00 AM   Result Value Ref Range    Glucose 104 (H) 70 - 99 mg/dL   Glucose by meter    Collection Time: 09/29/20 11:52 AM   Result Value Ref Range    Glucose 245 (H) 70 - 99 mg/dL   Glucose by meter    Collection Time: 09/29/20  1:27 PM   Result Value Ref Range    Glucose 259 (H) 70 - 99 mg/dL   Glucose by meter    Collection Time: 09/29/20  5:17 PM   Result Value Ref Range    Glucose 327 (H) 70 - 99 mg/dL   Glucose by meter    Collection Time: 09/29/20  9:35 PM   Result Value Ref Range    Glucose 302 (H) 70 - 99 mg/dL   Glucose by meter    Collection Time: 09/30/20  2:23 AM   Result Value Ref Range    Glucose 175 (H) 70 - 99 mg/dL   Glucose by meter     Collection Time: 09/30/20  7:59 AM   Result Value Ref Range    Glucose 147 (H) 70 - 99 mg/dL   Glucose by meter    Collection Time: 09/30/20 12:08 PM   Result Value Ref Range    Glucose 108 (H) 70 - 99 mg/dL   Glucose by meter    Collection Time: 09/30/20  3:48 PM   Result Value Ref Range    Glucose 134 (H) 70 - 99 mg/dL   Glucose by meter    Collection Time: 09/30/20 10:08 PM   Result Value Ref Range    Glucose 233 (H) 70 - 99 mg/dL   Glucose by meter    Collection Time: 10/01/20  2:25 AM   Result Value Ref Range    Glucose 143 (H) 70 - 99 mg/dL   Glucose by meter    Collection Time: 10/01/20  7:23 AM   Result Value Ref Range    Glucose 88 70 - 99 mg/dL   Drug abuse screen 77 urine (FL, RH, SH)    Collection Time: 10/13/20  9:45 AM   Result Value Ref Range    Amphetamine Qual Urine Positive (A) NEG^Negative    Barbiturates Qual Urine Negative NEG^Negative    Benzodiazepine Qual Urine Negative NEG^Negative    Cannabinoids Qual Urine Negative NEG^Negative    Cocaine Qual Urine Positive (A) NEG^Negative    Opiates Qualitative Urine Negative NEG^Negative    PCP Qual Urine Negative NEG^Negative   Asymptomatic COVID-19 Virus (Coronavirus) by PCR    Collection Time: 10/13/20 11:54 AM    Specimen: Nasopharyngeal   Result Value Ref Range    COVID-19 Virus PCR to [facility] - Source Nasopharyngeal     COVID-19 Virus PCR to [facility] - Result       Test received-See reflex to IDDL test SARS CoV2 (COVID-19) Virus RT-PCR   SARS-CoV-2 COVID-19 Virus (Coronavirus) RT-PCR Nasopharyngeal    Collection Time: 10/13/20 11:54 AM    Specimen: Nasopharyngeal   Result Value Ref Range    SARS-CoV-2 Virus Specimen Source Nasopharyngeal     SARS-CoV-2 PCR Result NEGATIVE     SARS-CoV-2 PCR Comment       Testing was performed using the Simplexa COVID-19 Direct Assay on the [employer] Liaison MDX   instrument. Additional information about this Emergency Use Authorization (EUA) assay can   be found via the Lab Guide.     Glucose by meter     Collection Time: 10/13/20 12:01 PM   Result Value Ref Range    Glucose 185 (H) 70 - 99 mg/dL   Glucose by meter    Collection Time: 10/13/20  6:47 PM   Result Value Ref Range    Glucose 330 (H) 70 - 99 mg/dL   Glucose by meter    Collection Time: 10/13/20  9:29 PM   Result Value Ref Range    Glucose 261 (H) 70 - 99 mg/dL   Glucose by meter    Collection Time: 10/14/20  2:26 AM   Result Value Ref Range    Glucose 208 (H) 70 - 99 mg/dL   Glucose by meter    Collection Time: 10/14/20  7:35 AM   Result Value Ref Range    Glucose 103 (H) 70 - 99 mg/dL   Glucose by meter    Collection Time: 10/14/20 12:55 PM   Result Value Ref Range    Glucose 136 (H) 70 - 99 mg/dL   Basic metabolic panel    Collection Time: 10/14/20  4:50 PM   Result Value Ref Range    Sodium 135 133 - 144 mmol/L    Potassium 4.0 3.4 - 5.3 mmol/L    Chloride 105 94 - 109 mmol/L    Carbon Dioxide 24 20 - 32 mmol/L    Anion Gap 6 3 - 14 mmol/L    Glucose 144 (H) 70 - 99 mg/dL    Urea Nitrogen 11 7 - 30 mg/dL    Creatinine 0.97 0.66 - 1.25 mg/dL    GFR Estimate 86 >60 mL/min/[1.73_m2]    GFR Estimate If Black >90 >60 mL/min/[1.73_m2]    Calcium 8.9 8.5 - 10.1 mg/dL   CBC with platelets    Collection Time: 10/14/20  4:50 PM   Result Value Ref Range    WBC 7.1 4.0 - 11.0 10e9/L    RBC Count 4.62 4.4 - 5.9 10e12/L    Hemoglobin 13.8 13.3 - 17.7 g/dL    Hematocrit 40.4 40.0 - 53.0 %    MCV 87 78 - 100 fl    MCH 29.9 26.5 - 33.0 pg    MCHC 34.2 31.5 - 36.5 g/dL    RDW 12.7 10.0 - 15.0 %    Platelet Count 242 150 - 450 10e9/L   Magnesium    Collection Time: 10/14/20  4:50 PM   Result Value Ref Range    Magnesium 2.1 1.6 - 2.3 mg/dL   Phosphorus    Collection Time: 10/14/20  4:50 PM   Result Value Ref Range    Phosphorus 4.1 2.5 - 4.5 mg/dL   Glucose by meter    Collection Time: 10/14/20  5:12 PM   Result Value Ref Range    Glucose 161 (H) 70 - 99 mg/dL   Glucose by meter    Collection Time: 10/14/20  9:52 PM   Result Value Ref Range    Glucose 175 (H) 70 - 99  mg/dL   Glucose by meter    Collection Time: 10/15/20  1:55 AM   Result Value Ref Range    Glucose 164 (H) 70 - 99 mg/dL   Glucose by meter    Collection Time: 10/15/20  7:47 AM   Result Value Ref Range    Glucose 103 (H) 70 - 99 mg/dL   Glucose by meter    Collection Time: 10/15/20 11:54 AM   Result Value Ref Range    Glucose 114 (H) 70 - 99 mg/dL   Glucose by meter    Collection Time: 10/15/20  4:52 PM   Result Value Ref Range    Glucose 168 (H) 70 - 99 mg/dL   Glucose by meter    Collection Time: 10/15/20  8:09 PM   Result Value Ref Range    Glucose 165 (H) 70 - 99 mg/dL   Glucose by meter    Collection Time: 10/16/20  2:00 AM   Result Value Ref Range    Glucose 127 (H) 70 - 99 mg/dL            Psychiatric Examination:   Temp: 97.5  F (36.4  C) Temp src: Oral BP: (!) 146/73 Pulse: 82   Resp: 16 SpO2: 100 % O2 Device: None (Room air)    Weight is 231 lbs 4.8 oz  Body mass index is 30.52 kg/m .    Appearance: poorly groomed, well groomed, awake, alert, cooperative, mild distress and moderately obese  Attitude:  cooperative  Eye Contact:  good  Mood:  better  Affect:  intensity is flat and irritable  Speech:  mumbling  Psychomotor Behavior:  no evidence of tardive dyskinesia, dystonia, or tics  Throught Process:  logical and goal oriented  Associations:  no loose associations  Thought Content:  no evidence of suicidal ideation or homicidal ideation  Insight:  good  Judgement:  intact  Oriented to:  time, person, and place  Attention Span and Concentration:  intact  Recent and Remote Memory:  intact         Precautions:     Behavioral Orders   Procedures     Assault precautions     Code 1 - Restrict to Unit     Routine Programming     As clinically indicated     Status 15     Every 15 minutes.     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  He has been using cocaine on a daily basis and amphetamine yesterday.  He was discharged from this unit 2 weeks ago and was supposed to go to CD treatment however, did not follow through.  He has been living on the street.  Reports increased depression and suicidal thoughts.  He wants CD treatment.  Medication changes will include the following:   --Discontinue Wellbutrin.    --Start Prozac, 20 mg every morning.    --Naltrexone 50 mg, qam for substance abuse.   --Continue gabapentin 400 mg 3 times a day for neuropathy.    --Blood work was reviewed.  Internal medicine follow-up for medical problems.   --The patient was consulted on nature of illness and treatment options.   --Care was coordinated with the treatment team.     Disposition Plan   Reason for ongoing admission: is unable to care for self due to substance abuse  Disposition: TBD  Estimated length of stay: 5-7 days  Legal Status:  voluntary  Discharge will be granted once symptoms improved.    Melita ROBERTSON CNP  Date: 10/16/20  Time: 12:46 PM      More than 30 minutes were spent for assessment, documentation, and coordination of care.     This note was created with the help of Dragon dictation system. All grammatical/typing errors or context distortion are unintentional and inherent to software.

## 2020-10-16 NOTE — PROGRESS NOTES
Pt presents with blunted, flat affect and calm mood. Continues to endorse passive SI with no plan and can contract for safety in the hospital. Denies SIB and hallucinations. Remained isolative to his room this evening, only coming out for meals and snacks. VSS. Medication compliant. No other concerns or complaints at this time.

## 2020-10-16 NOTE — PLAN OF CARE
Problem: General Plan of Care (Inpatient Behavioral)  Goal: Team Discussion  Description: Team Plan:  Outcome: No Change  Note: BEHAVIORAL TEAM DISCUSSION    Participants: AILYN Estrada, BRAD, Paty Serna RN, [place] Daugherty [facility]  Progress: Some improvement   Anticipated length of stay: 5-7 days  Continued Stay Criteria/Rationale: Suicidal ideation, substance abuse disorder  Medical/Physical: See medical notes  Precautions:   Behavioral Orders   Procedures     Assault precautions     Code 1 - Restrict to Unit     Routine Programming     As clinically indicated     Status 15     Every 15 minutes.     Suicide precautions     Patients on Suicide Precautions should have a Combination Diet ordered that includes a Diet selection(s) AND a Behavioral Tray selection for Safe Tray - with utensils, or Safe Tray - NO utensils       Plan: Pt. will be discharge early next week to treatment center   Rationale for change in precautions or plan: N/A

## 2020-10-16 NOTE — PROGRESS NOTES
"Flat affect, mood is calm. Pt denies anxiety and SI/SIB, pt endorses \"some\" depression. Sutures covered with bandage, pt denies pain, no redness, drainage or warmth noted.   "

## 2020-10-17 LAB
GLUCOSE BLDC GLUCOMTR-MCNC: 105 MG/DL (ref 70–99)
GLUCOSE BLDC GLUCOMTR-MCNC: 119 MG/DL (ref 70–99)
GLUCOSE BLDC GLUCOMTR-MCNC: 148 MG/DL (ref 70–99)
GLUCOSE BLDC GLUCOMTR-MCNC: 159 MG/DL (ref 70–99)
GLUCOSE BLDC GLUCOMTR-MCNC: 182 MG/DL (ref 70–99)

## 2020-10-17 PROCEDURE — H2032 ACTIVITY THERAPY, PER 15 MIN: HCPCS

## 2020-10-17 PROCEDURE — 250N000013 HC RX MED GY IP 250 OP 250 PS 637: Performed by: NURSE PRACTITIONER

## 2020-10-17 PROCEDURE — 999N001017 HC STATISTIC GLUCOSE BY METER IP

## 2020-10-17 PROCEDURE — 124N000003 HC R&B MH SENIOR/ADOLESCENT

## 2020-10-17 RX ADMIN — Medication 25 MG: at 08:46

## 2020-10-17 RX ADMIN — GABAPENTIN 400 MG: 400 CAPSULE ORAL at 14:14

## 2020-10-17 RX ADMIN — FLUOXETINE 20 MG: 20 CAPSULE ORAL at 08:46

## 2020-10-17 RX ADMIN — GABAPENTIN 400 MG: 400 CAPSULE ORAL at 08:46

## 2020-10-17 RX ADMIN — LIRAGLUTIDE 0.6 MG: 6 INJECTION SUBCUTANEOUS at 17:10

## 2020-10-17 RX ADMIN — GABAPENTIN 400 MG: 400 CAPSULE ORAL at 20:10

## 2020-10-17 RX ADMIN — NICOTINE 1 PATCH: 21 PATCH, EXTENDED RELEASE TRANSDERMAL at 08:46

## 2020-10-17 RX ADMIN — INSULIN GLARGINE 15 UNITS: 100 INJECTION, SOLUTION SUBCUTANEOUS at 20:10

## 2020-10-17 ASSESSMENT — ACTIVITIES OF DAILY LIVING (ADL)
LAUNDRY: UNABLE TO COMPLETE
HYGIENE/GROOMING: INDEPENDENT
ORAL_HYGIENE: INDEPENDENT
DRESS: INDEPENDENT
DRESS: INDEPENDENT
LAUNDRY: WITH SUPERVISION
LAUNDRY: UNABLE TO COMPLETE
HYGIENE/GROOMING: INDEPENDENT
HYGIENE/GROOMING: INDEPENDENT
ORAL_HYGIENE: INDEPENDENT
ORAL_HYGIENE: INDEPENDENT
DRESS: SCRUBS (BEHAVIORAL HEALTH)

## 2020-10-17 NOTE — PLAN OF CARE
Patient spent most of the shift out in the milieu, he appears calm and withdrawn. He rates depression and anxiety at 0/10, denies any thoughts of SI/SIB.

## 2020-10-17 NOTE — PROGRESS NOTES
10/17/20 1426   Psycho Education   Type of Intervention 1:1 intervention   Response participates with encouragement   Hours 0.5   Treatment Detail check in   Behavioral Health   Hallucinations denies / not responding to hallucinations   Thinking intact   Orientation person: oriented;place: oriented;date: oriented;time: oriented   Memory baseline memory   Insight poor   Judgement impaired   Eye Contact at examiner   Affect blunted, flat;irritable   Mood mood is calm;irritable   Physical Appearance/Attire attire appropriate to age and situation   Hygiene well groomed   Suicidality other (see comments)  (denies)   1. Wish to be Dead (Recent) No   2. Non-Specific Active Suicidal Thoughts (Recent) No   Self Injury other (see comment)  (none)   Elopement   (none)   Activity isolative;withdrawn   Speech clear;coherent   Medication Sensitivity no stated side effects;no observed side effects   Psychomotor / Gait balanced;steady   Activities of Daily Living   Hygiene/Grooming independent   Oral Hygiene independent   Dress independent   Laundry unable to complete   Room Organization independent     Pt had a unremarkable day shift. Pt came out for meals and not much else. Pt took a shower. Pt denies feeling anxious or depressed. Pt states that he has been sleeping poorly and feels that he may get better sleep if he is not woken up at 2 AM anymore for BG check. Pt has good appetite. No concerns from pt. Pt did not attend group today. Pt is minimally social with few peers.

## 2020-10-17 NOTE — PROGRESS NOTES
Patient reluctant to complete 0200 BS.  BS of 156 at 0200.  Patient requesting that 0200 BS be discontinued.  Per patient he has difficulty falling asleep after 0200 BS checks.  Patient informed of indications for 0200 BS check.  Patient informed that sleep medications are available however patient declined.

## 2020-10-17 NOTE — PROGRESS NOTES
Sutures removed from left finger. Patient had small amount of bleeding at site as he jerked his hand away while writer was holding onto suture with pickups. Bleeding stopped with pressure and bandaid applied after. 5 sutures removed total.

## 2020-10-18 LAB
GLUCOSE BLDC GLUCOMTR-MCNC: 129 MG/DL (ref 70–99)
GLUCOSE BLDC GLUCOMTR-MCNC: 142 MG/DL (ref 70–99)
GLUCOSE BLDC GLUCOMTR-MCNC: 148 MG/DL (ref 70–99)
GLUCOSE BLDC GLUCOMTR-MCNC: 96 MG/DL (ref 70–99)

## 2020-10-18 PROCEDURE — 999N001017 HC STATISTIC GLUCOSE BY METER IP

## 2020-10-18 PROCEDURE — 124N000003 HC R&B MH SENIOR/ADOLESCENT

## 2020-10-18 PROCEDURE — 250N000013 HC RX MED GY IP 250 OP 250 PS 637: Performed by: NURSE PRACTITIONER

## 2020-10-18 PROCEDURE — H2032 ACTIVITY THERAPY, PER 15 MIN: HCPCS

## 2020-10-18 RX ADMIN — Medication 25 MG: at 08:01

## 2020-10-18 RX ADMIN — FLUOXETINE 20 MG: 20 CAPSULE ORAL at 08:01

## 2020-10-18 RX ADMIN — INSULIN GLARGINE 15 UNITS: 100 INJECTION, SOLUTION SUBCUTANEOUS at 20:30

## 2020-10-18 RX ADMIN — NICOTINE 1 PATCH: 21 PATCH, EXTENDED RELEASE TRANSDERMAL at 08:01

## 2020-10-18 RX ADMIN — GABAPENTIN 400 MG: 400 CAPSULE ORAL at 13:18

## 2020-10-18 RX ADMIN — LIRAGLUTIDE 0.6 MG: 6 INJECTION SUBCUTANEOUS at 17:20

## 2020-10-18 RX ADMIN — GABAPENTIN 400 MG: 400 CAPSULE ORAL at 20:30

## 2020-10-18 RX ADMIN — GABAPENTIN 400 MG: 400 CAPSULE ORAL at 08:01

## 2020-10-18 ASSESSMENT — ACTIVITIES OF DAILY LIVING (ADL)
LAUNDRY: WITH SUPERVISION
HYGIENE/GROOMING: INDEPENDENT
ORAL_HYGIENE: INDEPENDENT
DRESS: INDEPENDENT

## 2020-10-18 ASSESSMENT — MIFFLIN-ST. JEOR: SCORE: 1935.3

## 2020-10-18 NOTE — PLAN OF CARE
Pt indicated he Does not want someone coming in his room at 0200 to get his blood sugar; pt indicated he will refuse.

## 2020-10-18 NOTE — PLAN OF CARE
Patient appeared calm, was out in the milieu watching T.V with others. He reports improved mood, denies anxiety, depression and SI/SIB.

## 2020-10-18 NOTE — PROGRESS NOTES
10/17/20 2200   Art Therapy   Type of Intervention structured groups   Response participates with encouragement   Hours 1   Treatment Detail    (Art Therapy) [employer] [place]/ music   Objective- Patients use art media,the creative process & resulting artwork to:explore feelings,reconcile emotions, gain self-awareness/ self esteem, manage behaviors, develop social skills, improve reality orientation, & reduce anxiety /depression.     Outcome- pt reported feeling pretty good. At first when out in the lounge watching tv, he declined invitation. He did attend, he was hesitant at first but with listening to some music he selected he seemed to calm and was moving and singing to the music. He tried the project and was open minded to try something new. He was enjoying social interactions in group , mood brightened during the group hour. Pt was pleasant, cooperative and engaged.

## 2020-10-18 NOTE — PLAN OF CARE
Pt presents with blunted, flat affect and calm mood. Denies SI/SIB and hallucinations. Pt spent part of the evening in his room resting but did come out for meals and to watch television. Pt did attend art therapy. Pt had no behavioral issues this evening. He brightened with conversation and looks forward to watching the football game tomorrow evening.  and 182. Medication compliant. No other concerns or complaints noted.

## 2020-10-19 LAB
GLUCOSE BLDC GLUCOMTR-MCNC: 100 MG/DL (ref 70–99)
GLUCOSE BLDC GLUCOMTR-MCNC: 201 MG/DL (ref 70–99)
GLUCOSE BLDC GLUCOMTR-MCNC: 209 MG/DL (ref 70–99)
GLUCOSE BLDC GLUCOMTR-MCNC: 77 MG/DL (ref 70–99)

## 2020-10-19 PROCEDURE — H2032 ACTIVITY THERAPY, PER 15 MIN: HCPCS

## 2020-10-19 PROCEDURE — 99232 SBSQ HOSP IP/OBS MODERATE 35: CPT | Mod: 95 | Performed by: PSYCHIATRY & NEUROLOGY

## 2020-10-19 PROCEDURE — 250N000013 HC RX MED GY IP 250 OP 250 PS 637: Performed by: PSYCHIATRY & NEUROLOGY

## 2020-10-19 PROCEDURE — 250N000013 HC RX MED GY IP 250 OP 250 PS 637: Performed by: NURSE PRACTITIONER

## 2020-10-19 PROCEDURE — 124N000003 HC R&B MH SENIOR/ADOLESCENT

## 2020-10-19 PROCEDURE — 999N001017 HC STATISTIC GLUCOSE BY METER IP

## 2020-10-19 RX ORDER — LANOLIN ALCOHOL/MO/W.PET/CERES
3 CREAM (GRAM) TOPICAL AT BEDTIME
Status: DISCONTINUED | OUTPATIENT
Start: 2020-10-19 | End: 2020-10-20 | Stop reason: HOSPADM

## 2020-10-19 RX ORDER — LANOLIN ALCOHOL/MO/W.PET/CERES
3 CREAM (GRAM) TOPICAL AT BEDTIME
Qty: 30 TABLET | Refills: 1 | Status: SHIPPED | OUTPATIENT
Start: 2020-10-19 | End: 2020-11-04

## 2020-10-19 RX ADMIN — MELATONIN TAB 3 MG 3 MG: 3 TAB at 20:52

## 2020-10-19 RX ADMIN — NALTREXONE HYDROCHLORIDE 50 MG: 50 TABLET, FILM COATED ORAL at 08:25

## 2020-10-19 RX ADMIN — GABAPENTIN 400 MG: 400 CAPSULE ORAL at 20:52

## 2020-10-19 RX ADMIN — GABAPENTIN 400 MG: 400 CAPSULE ORAL at 14:06

## 2020-10-19 RX ADMIN — GABAPENTIN 400 MG: 400 CAPSULE ORAL at 08:25

## 2020-10-19 RX ADMIN — INSULIN GLARGINE 15 UNITS: 100 INJECTION, SOLUTION SUBCUTANEOUS at 20:52

## 2020-10-19 RX ADMIN — LIRAGLUTIDE 0.6 MG: 6 INJECTION SUBCUTANEOUS at 17:00

## 2020-10-19 RX ADMIN — FLUOXETINE 20 MG: 20 CAPSULE ORAL at 08:25

## 2020-10-19 RX ADMIN — NICOTINE 1 PATCH: 21 PATCH, EXTENDED RELEASE TRANSDERMAL at 08:25

## 2020-10-19 ASSESSMENT — ACTIVITIES OF DAILY LIVING (ADL)
ORAL_HYGIENE: INDEPENDENT
LAUNDRY: WITH SUPERVISION
DRESS: INDEPENDENT
ORAL_HYGIENE: INDEPENDENT
HYGIENE/GROOMING: INDEPENDENT
HYGIENE/GROOMING: INDEPENDENT
DRESS: SCRUBS (BEHAVIORAL HEALTH)

## 2020-10-19 NOTE — PLAN OF CARE
Patient is calm, cooperative and medication complaint. He denies anxiety, depression, and SI/SIB. He is looking forward to discharging tomorrow to [facility] [place] recovery.

## 2020-10-19 NOTE — DISCHARGE INSTRUCTIONS
Behavioral Discharge Planning and Instructions      Summary:  You were admitted on 10/13/2020  due to Depression, Suicidal Ideations and Chemical Use Issues.  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 This is a phone visit. Your provider will contact you at appointed time.  [facility]  [address] [place] [address]  Phone: [phone]      Attend all scheduled appointments with your outpatient providers. Call at least 24 hours in advance if you need to reschedule an appointment to ensure continued access to your outpatient providers.   Major Treatments, Procedures and Findings:  You were provided with: a psychiatric assessment, assessed for medical stability, medication evaluation and/or management, milieu management and medical interventions    Symptoms to Report: feeling more aggressive, increased confusion, losing more sleep, mood getting worse or thoughts of suicide    Early warning signs can include: increased depression or anxiety sleep disturbances increased thoughts or behaviors of suicide or self-harm  increased unusual thinking, such as paranoia or hearing voices    Safety and Wellness:  Take all medicines as directed.  Make no changes unless your doctor suggests them.      Follow treatment recommendations.  Refrain from alcohol and non-prescribed drugs.  If there is a concern for safety, call 911.    Resources:    Crisis Intervention: 171.403.7208 or 586-542-5899 (TTY: 653.754.6824).  Call anytime for help.  National [place] on Mental Illness (www.[place].isamar.org): 260.473.2411 or 246-360-1675.  Alcoholics Anonymous (www.alcoholics-anonymous.org): Check your phone book for your local chapter.  Suicide Awareness Voices of Education (SAVE) (www.save.org): 382-792-JGHF (2077)  National Suicide Prevention Line (www.mentalhealthmn.org): 770-952-OGVF (9976)  Mental Health Consumer/Survivor Network of [place] (www.mhcsn.net): 101.734.3206 or 155-353-4848  Mental Health Association of [place] (www.mentalhealth.org): 396.765.3483 or 266-271-2269  Self- Management and Recovery Training., SMART-- Toll free: [phone]  www.[place].org  [facility] Crisis (COPE) Response - Adult [phone]      The treatment team has appreciated the opportunity to work with you.     If you have any questions or concerns our unit number is [phone]

## 2020-10-19 NOTE — PROGRESS NOTES
10/18/20 2200   Art Therapy   Type of Intervention structured groups   Response participates with encouragement   Hours 1   Treatment Detail    (Art Therapy) DBT house   Objective- Patients use art media,the creative process & resulting artwork to:explore feelings,reconcile emotions, gain self-awareness/ self esteem, manage behaviors, develop social skills, improve reality orientation, & reduce anxiety /depression.     Outcome- pt reported feeling pretty good. At first when out in the [facility] watching [facility], he declined invitation. He did attend however after his peers all left the [facility] for group. He completed his project with very simple clear answers. He in the project said he wanted to change his life and was pleased to be going to treatment. He was enjoying social interactions in group and helping select some music for the end of group , mood brightened during the group hour. Pt was pleasant, cooperative and engaged.

## 2020-10-19 NOTE — PROGRESS NOTES
Work Completed:Reviewed chart. Spoke with admissions at [facility]. He will admit to their facility tomorrow at approximately 11am. Spoke with provider who ordered medications for pt. Met with pt to discuss discharge. Completed AVS.    Discharge plan or goal: Discharge to treatment facility                Barriers to discharge:Pt needs further stabilization. He will dicharge to [facility] tomorrow

## 2020-10-19 NOTE — PROGRESS NOTES
"[facility] [place]   Psychiatric Progress Note        Interim History:   The patient's care was discussed with the treatment team during the daily team meeting and/or staff's chart notes were reviewed.  Staff report patient has still had some depression but denies SI. To CD treatment soon.     The patient reports that he has some depression but is \"okay.\" Not sleeping well. Difficulty falling and staying asleep. Never tried melatonin before. Denies SI or HI. Denies AH or VH. Okay with discharging to [facility] soon.          Medications:       sodium chloride 0.9%  1,000 mL Intravenous Once     FLUoxetine  20 mg Oral Daily     gabapentin  400 mg Oral TID     insulin glargine  15 Units Subcutaneous Q24H     liraglutide  0.6 mg Subcutaneous Daily with supper     melatonin  3 mg Oral At Bedtime     naltrexone  50 mg Oral Daily     nicotine  1 patch Transdermal Q24H     nicotine   Transdermal Q8H          Allergies:   No Known Allergies       Labs:     Recent Results (from the past 24 hour(s))   Glucose by meter    Collection Time: 10/18/20  4:48 PM   Result Value Ref Range    Glucose 142 (H) 70 - 99 mg/dL   Glucose by meter    Collection Time: 10/18/20  9:49 PM   Result Value Ref Range    Glucose 148 (H) 70 - 99 mg/dL   Glucose by meter    Collection Time: 10/19/20  7:48 AM   Result Value Ref Range    Glucose 77 70 - 99 mg/dL   Glucose by meter    Collection Time: 10/19/20 12:10 PM   Result Value Ref Range    Glucose 100 (H) 70 - 99 mg/dL          Psychiatric Examination:     /81   Pulse 84   Temp 98.4  F (36.9  C) (Temporal)   Resp 16   Ht 1.854 m (6' 1\")   Wt 106.1 kg (234 lb)   SpO2 99%   BMI 30.87 kg/m    Weight is 234 lbs 0 oz  Body mass index is 30.87 kg/m .  Orthostatic Vitals       Most Recent      Lying Orthostatic /81 10/19 0833    Lying Orthostatic Pulse (bpm) 84 10/19 0833    Sitting Orthostatic /74 10/19 0833    Sitting Orthostatic Pulse (bpm) 97 " 10/19 0833    Standing Orthostatic /73 10/18 0745    Standing Orthostatic Pulse (bpm) 83 10/18 0745            Appearance: awake, alert and adequately groomed  Attitude:  cooperative  Eye Contact:  fair  Mood:  depressed  Affect:  mood congruent  Speech:  clear, coherent  Psychomotor Behavior:  no evidence of tardive dyskinesia, dystonia, or tics  Thought Process:  goal oriented  Associations:  no loose associations  Thought Content:  no evidence of suicidal ideation or homicidal ideation and no evidence of psychotic thought  Insight:  fair  Judgement:  intact  Oriented to:  time, person, and place  Attention Span and Concentration:  intact  Recent and Remote Memory:  fair    Clinical Global Impressions  First:  Considering your total clinical experience with this particular patient population, how severe are the patient's symptoms at this time?: 7 (10/14/20 1406)  Compared to the patient's condition at the START of treatment, this patient's condition is: 7 (10/14/20 1406)  Most recent:  Considering your total clinical experience with this particular patient population, how severe are the patient's symptoms at this time?: 7 (10/14/20 1406)  Compared to the patient's condition at the START of treatment, this patient's condition is: 7 (10/14/20 1406)         Precautions:     Behavioral Orders   Procedures     Assault precautions     Code 1 - Restrict to Unit     Fall precautions     Pt has orthostatic drop, pt asymptomatic, fluids encouraged, pt educated on changing positions slowly, pt verbalized understanding.     Routine Programming     As clinically indicated     Status 15     Every 15 minutes.     Suicide precautions     Patients on Suicide Precautions should have a Combination Diet ordered that includes a Diet selection(s) AND a Behavioral Tray selection for Safe Tray - with utensils, or Safe Tray - NO utensils            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, recurrent,  severe with psychotic features)  Stimulant Use Disorder, cocaine type, severe  Amphetamine Use Disorder, severe  Nicotine Use Disorder  R/O PTSD         Plan:     1) Continue Prozac, gabapentin and naltrexone.   2) Start Melatonin 3mg at bedtime.   3) Patient to [facility] tomorrow.       Disposition Plan   Reason for ongoing admission: poses an imminent risk to self  Discharge location: Chemical dependency treatment facility  Discharge Medications: ordered.  Follow-up Appointments: scheduled  Legal Status: voluntary    Video-Visit Details    Type of service:  Video Visit    Video Start Time (time video started): 1028    Video End Time (time video stopped): 1035    Originating Location (pt. Location): Other Station 3B    Distant Location (provider location): Provider remote location    Mode of Communication:  Video Conference via Polycom    Physician has received verbal consent for a Video Visit from the patient? Yes    Entered by: Juan A Caban on 10/19/2020 at 12:46 PM

## 2020-10-20 VITALS
BODY MASS INDEX: 31.09 KG/M2 | HEART RATE: 73 BPM | HEIGHT: 73 IN | WEIGHT: 234.6 LBS | OXYGEN SATURATION: 98 % | DIASTOLIC BLOOD PRESSURE: 88 MMHG | SYSTOLIC BLOOD PRESSURE: 143 MMHG | TEMPERATURE: 97.7 F | RESPIRATION RATE: 16 BRPM

## 2020-10-20 LAB — GLUCOSE BLDC GLUCOMTR-MCNC: 72 MG/DL (ref 70–99)

## 2020-10-20 PROCEDURE — 250N000013 HC RX MED GY IP 250 OP 250 PS 637: Performed by: NURSE PRACTITIONER

## 2020-10-20 PROCEDURE — 99239 HOSP IP/OBS DSCHRG MGMT >30: CPT | Mod: 95 | Performed by: PSYCHIATRY & NEUROLOGY

## 2020-10-20 PROCEDURE — 999N001017 HC STATISTIC GLUCOSE BY METER IP

## 2020-10-20 RX ADMIN — FLUOXETINE 20 MG: 20 CAPSULE ORAL at 08:05

## 2020-10-20 RX ADMIN — NICOTINE 1 PATCH: 21 PATCH, EXTENDED RELEASE TRANSDERMAL at 08:05

## 2020-10-20 RX ADMIN — NALTREXONE HYDROCHLORIDE 50 MG: 50 TABLET, FILM COATED ORAL at 08:05

## 2020-10-20 RX ADMIN — GABAPENTIN 400 MG: 400 CAPSULE ORAL at 08:05

## 2020-10-20 ASSESSMENT — ACTIVITIES OF DAILY LIVING (ADL)
LAUNDRY: WITH SUPERVISION
DRESS: INDEPENDENT
HYGIENE/GROOMING: INDEPENDENT
ORAL_HYGIENE: INDEPENDENT

## 2020-10-20 ASSESSMENT — MIFFLIN-ST. JEOR: SCORE: 1938.02

## 2020-10-20 NOTE — DISCHARGE SUMMARY
"Psychiatric Discharge Summary    Brody Colindres MRN# [id number]   Age: 60 year old YOB: 1960     Date of Admission:  10/13/2020  Date of Discharge:  10/20/2020 11:14 AM  Admitting Physician:  Melita Dumont NP  Discharge Physician:  Juan A Caban MD          Event Leading to Hospitalization:   The patient was minimally responsive to questions.  He reported being very tired.  He was noticed to be moving his lower extremities a bit.  He feels tired.  He does not appear confused.  He is irritable.  Reports that since the last time he was on this unit, 2 weeks ago he relapsed right away.  He did not go to treatment.  He has been living on the street.  He has used cocaine on a daily basis.  Yesterday, he used methamphetamines and cocaine.  Reports being in detox between his last hospitalization and today but did not remember where.  Reports that he has been having increased thoughts of killing himself \"for screwing up again\".  Depression is rated as high.  His sleep is good.  Appetite is low.  Anxiety is \"there\".  He denies visual and elderly hallucinations.  Denies paranoia.  In the emergency room, the patient reported paranoia, believing that people are following him, trying to harm him in some way.  The patient is not willing to respond to any more questions regarding his mental health.  The goal for this hospitalization is to get into a CD treatment.  Reports taking his medications as prescribed which is inconsistent with his report in the emergency room.       See Admission note for additional details.          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, recurrent, severe with psychotic features)  Stimulant Use Disorder, cocaine type, severe  Amphetamine Use Disorder, severe  Nicotine Use Disorder  R/O PTSD         Labs:        Lab Results   Component Value Date     10/14/2020    Lab Results   Component Value Date    CHLORIDE 105 10/14/2020    Lab " Results   Component Value Date    BUN 11 10/14/2020      Lab Results   Component Value Date    POTASSIUM 4.0 10/14/2020    Lab Results   Component Value Date    CO2 24 10/14/2020    Lab Results   Component Value Date    CR 0.97 10/14/2020          Lab Results   Component Value Date    WBC 7.1 10/14/2020    HGB 13.8 10/14/2020    HCT 40.4 10/14/2020    MCV 87 10/14/2020     10/14/2020     Lab Results   Component Value Date    AST 12 09/28/2020    ALT 16 09/28/2020    ALKPHOS 59 09/28/2020    BILITOTAL 0.4 09/28/2020     Lab Results   Component Value Date    TSH 0.64 09/28/2020            Consults:   Consultation during this admission received from internal medicine:    Brody Colindres is a 60 year old male with a history of schizophrenia, paranoia, substance abuse, type 2 DM, and chronic pain admitted to station 3B for suicidal ideation.          # SI  # Schizophrenia, depression, paranoia  Most recently admitted to inpatient psychiatry on 9/25-10/1 for active SI.     - Management per Psychiatry      # Asymptomatic hypotension - Possibly 2/2 underlying DM neuropathy vs hypovolemia.  Not on any obviously contributory meds apart from Prozac 20mg daily.  Possibly nutritional intake low in setting of recent substance use.  Declined 1L fluid bolus last evening.  Pt denies dizziness, lightheadedness, and chest pain.  HR stable.     - Encourage PO intake         # Type 2 DM - Last Hgb A1c 7.7%.  On Victoza 0.6mg daily and Lantus 15 units PTA.  BGs last 24 hr:            Recent Labs   Lab 10/15/20  0747 10/15/20  0155 10/14/20  2152 10/14/20  1712 10/14/20  1650 10/14/20  1255 10/14/20  0735   GLC  --   --   --   --  144*  --   --    * 164* 175* 161*  --  136* 103*      - Continue PTA Victoza and Lantus   - Monitor BGs ac/hs   - Hypoglycemia protocol in place      # R hand pain - Onset 1-2 days ago.  Does not recall trauma or falls.  Describes as dull achy pain. No swelling or tenderness.  Denies hx  arthritis.  No joint swelling.    - Can offer heat packs, APAP PRN   - Encourage ROM exercises   - Monitor   - Will consider XR if worsening or if new swelling, immobility     # Recent L third finger laceration - Seen in ED on 10/8, had sutures placed, due for removal today.  Up to date on tetanus vaccine.       # Hx substance abuse - Utox positive for amphetamine and cocaine.  No s/s acute withdrawal.            Hospital Course:   Brody Colindres was admitted to Station 3B with attending Melita Dumont NP and transferred to Juan A Caban MD as a voluntary patient. The patient was placed under status 15 (15 minute checks) to ensure patient safety.   MSSA protocol was initiated due to the patient's history of alcohol abuse and concern for withdrawal symptoms.  CBC, BMP and utox obtained.    The patient had not been taking any medications as an outpatient. Prozac was started at 20mg Qday to good effect. Gabapentin was continued. Naltrexone was added for alcohol cravings.     Brody Colindres did participate in groups and was visible in the milieu.     The patient's symptoms of depression and withdrawal improved.     Brody Colindres was released to  treatment. At the time of discharge Brody Colindres was determined to not be a danger to himself or others. At the current time of discharge, the patient does not meet criteria for involuntary hospitalization. On the day of discharge, the patient reports that they do not have suicidal or homicidal ideation and would never hurt themselves or others. Steps taken to minimize risk include: assessing patient s behavior and thought process daily during hospital stay, discharging patient with adequate plan for follow up for mental and physical health and discussing safety plan of returning to the hospital should the patient ever have thoughts of harming themselves or others. Therefore, based on all available evidence including the factors cited above, the  patient does not appear to be at imminent risk for self-harm, and is appropriate for outpatient level of care.           Discharge Medications:     Current Discharge Medication List      START taking these medications    Details   FLUoxetine (PROZAC) 20 MG capsule Take 1 capsule (20 mg) by mouth daily  Qty: 30 capsule, Refills: 0    Associated Diagnoses: Recurrent major depressive disorder, remission status unspecified (H)      melatonin 3 MG tablet Take 1 tablet (3 mg) by mouth At Bedtime  Qty: 30 tablet, Refills: 1    Associated Diagnoses: Recurrent major depressive disorder, remission status unspecified (H)      naltrexone (DEPADE/REVIA) 50 MG tablet Take 1 tablet (50 mg) by mouth daily  Qty: 30 tablet, Refills: 0    Associated Diagnoses: Substance abuse (H)         CONTINUE these medications which have CHANGED    Details   insulin glargine (LANTUS PEN) 100 UNIT/ML pen Inject 15 Units Subcutaneous every 24 hours  Qty: 3 mL, Refills: 0    Comments: If Lantus is not covered by insurance, may substitute Basaglar at same dose and frequency.    Associated Diagnoses: Insulin dependent type 2 diabetes mellitus (H)      liraglutide (VICTOZA) 18 MG/3ML solution Inject 0.6 mg Subcutaneous daily (with dinner)  Qty: 3 mL, Refills: 1    Associated Diagnoses: Insulin dependent type 2 diabetes mellitus (H)         CONTINUE these medications which have NOT CHANGED    Details   gabapentin (NEURONTIN) 400 MG capsule Take 1 capsule (400 mg) by mouth 3 times daily  Qty: 90 capsule, Refills: 0    Associated Diagnoses: Recurrent major depressive disorder, remission status unspecified (H)      alcohol swab prep pads Use to swab area of injection/nicci as directed.  Qty: 100 each, Refills: 3    Associated Diagnoses: Insulin dependent type 2 diabetes mellitus (H)      blood glucose (NO BRAND SPECIFIED) test strip Use to test blood sugar 4 times daily or as directed. To accompany: Blood Glucose Monitor Brands: per insurance.  Qty: 100  strip, Refills: 6    Associated Diagnoses: Insulin dependent type 2 diabetes mellitus (H)      blood glucose calibration (NO BRAND SPECIFIED) solution To accompany: Blood Glucose Monitor Brands: per insurance.  Qty: 1 Bottle, Refills: 3    Associated Diagnoses: Insulin dependent type 2 diabetes mellitus (H)      blood glucose monitoring (NO BRAND SPECIFIED) meter device kit Use to test blood sugar 4 times daily or as directed. Preferred blood glucose meter OR supplies to accompany: Blood Glucose Monitor Brands: per insurance.  Qty: 1 kit, Refills: 0    Associated Diagnoses: Insulin dependent type 2 diabetes mellitus (H)      thin (NO BRAND SPECIFIED) lancets Use with lanceting device. To accompany: Blood Glucose Monitor Brands: per insurance.  Qty: 120 each, Refills: 6    Associated Diagnoses: Insulin dependent type 2 diabetes mellitus (H)         STOP taking these medications       buPROPion (WELLBUTRIN SR) 100 MG 12 hr tablet Comments:   Reason for Stopping:                    Psychiatric Examination:   Appearance:  awake, alert and adequately groomed  Attitude:  cooperative  Eye Contact:  good  Mood:  good  Affect:  mood congruent  Speech:  clear, coherent  Psychomotor Behavior:  no evidence of tardive dyskinesia, dystonia, or tics  Thought Process:  goal oriented  Associations:  no loose associations  Thought Content:  no evidence of suicidal ideation or homicidal ideation and no evidence of psychotic thought  Insight:  fair  Judgment:  intact  Oriented to:  time, person, and place  Attention Span and Concentration:  intact  Recent and Remote Memory:  fair  Language: Able to read and write  Fund of Knowledge: appropriate  Muscle Strength and Tone: normal  Gait and Station: Normal         Discharge Plan:   Continue medications as above.     Summary:  You were admitted on 10/13/2020  due to Depression, Suicidal Ideations and Chemical Use Issues.  You were treated by AILYN Estrada DNP and discharged on  10/20/202 from Unit 3BW to  Substance abuse treatment program:     [facility]  [address] [place]  [address]  [phone]     Health Care Follow-up Appointments:   Medication management  Juana Calderon  - Thursday, October 29th at 1pm. This is a phone visit. Your provider will contact you at appointed time.  [facility]  [address] [place] [address]  Phone: [phone]        Attend all scheduled appointments with your outpatient providers. Call at least 24 hours in advance if you need to reschedule an appointment to ensure continued access to your outpatient providers.   Major Treatments, Procedures and Findings:  You were provided with: a psychiatric assessment, assessed for medical stability, medication evaluation and/or management, milieu management and medical interventions     Symptoms to Report: feeling more aggressive, increased confusion, losing more sleep, mood getting worse or thoughts of suicide     Early warning signs can include: increased depression or anxiety sleep disturbances increased thoughts or behaviors of suicide or self-harm  increased unusual thinking, such as paranoia or hearing voices     Safety and Wellness:  Take all medicines as directed.  Make no changes unless your doctor suggests them.      Follow treatment recommendations.  Refrain from alcohol and non-prescribed drugs.  If there is a concern for safety, call 911.     Resources:   Crisis Intervention: 537.597.9645 or 526-955-8632 (TTY: 723.966.2492).  Call anytime for help.  National [place] on Mental Illness (www.[place].isamar.org): 147.779.2422 or 365-074-8428.  Alcoholics Anonymous (www.alcoholics-anonymous.org): Check your phone book for your local chapter.  Suicide Awareness Voices of Education (SAVE) (www.save.org): 277-010-FRIQ (3969)  National Suicide Prevention Line (www.mentalhealthmn.org): 888-941-NPZL (6608)  Mental Health Consumer/Survivor Network of [place] (www.mhcsn.net): 220.582.4987  or 555-548-9775  Mental Health Association of [place] (www.mentalhealth.org): 795-504-3704 or 268-000-8354  Self- Management and Recovery Training., SMART-- Toll free: 123.459.5386  www.[place].Sidewalk  [facility] Crisis (COPE) Response - Adult [phone]    Attestation:    Video-Visit Details    Type of service:  Video Visit    Video Start Time (time video started): 0840    Video End Time (time video stopped): 0844    Originating Location (pt. Location): Other Station 3B    Distant Location (provider location): Provider remote location    Mode of Communication:  Video Conference via Polycom    Physician has received verbal consent for a Video Visit from the patient? Yes    Juan A Caban MD    The patient was seen and evaluated by me. I spent more than 30 minutes on discharge day activities. Juan A Caban MD

## 2020-10-20 NOTE — PROGRESS NOTES
Pt was in the milieu for the majority of the shift and was observed playing Scrabble and watching TV with other patients. Pt attended evening music therapy group and participated, and appeared to enjoy it. Pt during check-in appeared guarded and superficial, and denied all MH symptoms. Pt had no questions or concerns.           10/19/20 2113   Behavioral Health   Hallucinations denies / not responding to hallucinations   Thinking intact   Orientation time: oriented;date: oriented;place: oriented;person: oriented   Memory baseline memory   Insight denial of illness   Judgement impaired   Eye Contact at examiner   Affect blunted, flat   Mood mood is calm   Physical Appearance/Attire attire appropriate to age and situation   Hygiene well groomed   1. Wish to be Dead (Recent) No   2. Non-Specific Active Suicidal Thoughts (Recent) No   Elopement   (no concern)   Activity   (social)   Speech clear;coherent   Medication Sensitivity no stated side effects   Activities of Daily Living   Hygiene/Grooming independent   Oral Hygiene independent   Dress scrubs (behavioral health)   Room Organization independent

## 2020-10-20 NOTE — PLAN OF CARE
Pt denies suicidal ideation or homicidal ideation. Has received all belongings. Discharge medications and follow up instructions reviewed with patient. Patient verbalizes understanding of discharge instructions.

## 2020-10-20 NOTE — PROGRESS NOTES
Work Completed: Reviewed chart. Met with pt prior to discharge to discuss treatment program.    Discharge plan or goal: Discharge to CD treatment                Barriers to discharge: Pt will discharge today to [facility]

## 2020-10-20 NOTE — PROGRESS NOTES
10/19/20 2000   Music Therapy   Type of Participation Music therapy group   Response Participates independently   Hours 1   Brody participated in active-music making Instrument Clinic this evening, playing the [employer] drums.  He has a natural sense of rhythm and was a positive, contributing group member showing social and musical sensitivity to others. He engaged well, and his goals for group were self-expression, affirmation, socialization and relaxation/mood improvement.

## 2020-10-25 ENCOUNTER — TRANSFERRED RECORDS (OUTPATIENT)
Dept: HEALTH INFORMATION MANAGEMENT | Facility: CLINIC | Age: 60
End: 2020-10-25

## 2020-10-30 ENCOUNTER — HOSPITAL ENCOUNTER (EMERGENCY)
Facility: CLINIC | Age: 60
Discharge: HOME | End: 2020-10-30
Attending: PSYCHIATRY & NEUROLOGY | Admitting: PSYCHIATRY & NEUROLOGY
Payer: COMMERCIAL

## 2020-10-30 VITALS
DIASTOLIC BLOOD PRESSURE: 80 MMHG | SYSTOLIC BLOOD PRESSURE: 141 MMHG | OXYGEN SATURATION: 99 % | RESPIRATION RATE: 16 BRPM | TEMPERATURE: 99.4 F | HEART RATE: 91 BPM

## 2020-10-30 DIAGNOSIS — F43.23 ADJUSTMENT REACTION WITH ANXIETY AND DEPRESSION: ICD-10-CM

## 2020-10-30 DIAGNOSIS — F19.20 CHEMICAL DEPENDENCY (H): ICD-10-CM

## 2020-10-30 LAB
AMPHETAMINES UR QL SCN: NEGATIVE
BARBITURATES UR QL: NEGATIVE
BENZODIAZ UR QL: NEGATIVE
CANNABINOIDS UR QL SCN: NEGATIVE
COCAINE UR QL: POSITIVE
ETHANOL UR QL SCN: NEGATIVE
OPIATES UR QL SCN: NEGATIVE

## 2020-10-30 PROCEDURE — 80320 DRUG SCREEN QUANTALCOHOLS: CPT | Performed by: FAMILY MEDICINE

## 2020-10-30 PROCEDURE — 90791 PSYCH DIAGNOSTIC EVALUATION: CPT

## 2020-10-30 PROCEDURE — 80307 DRUG TEST PRSMV CHEM ANLYZR: CPT | Performed by: FAMILY MEDICINE

## 2020-10-30 PROCEDURE — 99285 EMERGENCY DEPT VISIT HI MDM: CPT | Mod: 25 | Performed by: PSYCHIATRY & NEUROLOGY

## 2020-10-30 PROCEDURE — 99284 EMERGENCY DEPT VISIT MOD MDM: CPT | Performed by: PSYCHIATRY & NEUROLOGY

## 2020-10-30 ASSESSMENT — ENCOUNTER SYMPTOMS
SLEEP DISTURBANCE: 1
NEUROLOGICAL NEGATIVE: 1
DYSPHORIC MOOD: 1
GASTROINTESTINAL NEGATIVE: 1
EYES NEGATIVE: 1
NERVOUS/ANXIOUS: 1
RESPIRATORY NEGATIVE: 1
CARDIOVASCULAR NEGATIVE: 1
HALLUCINATIONS: 0
MUSCULOSKELETAL NEGATIVE: 1
DECREASED CONCENTRATION: 1
ACTIVITY CHANGE: 1

## 2020-10-30 NOTE — DISCHARGE INSTRUCTIONS
Continue taking your prescribed meds. Follow-up with supportive services through [facility]. They had tried reaching you and left a message with your sister yesterday  Work on sobriety
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
## 2020-10-30 NOTE — ED NOTES
Pt brought over from the Main ED to BEC.  Pt told of the course of care while in [facility].  Pt stated understanding.

## 2020-10-30 NOTE — ED AVS SNAPSHOT
[facility] Emergency Department  [address]  [place] [address]  Phone: [phone]  Fax: [phone]                                    Brody Colindres   MRN: [id number]    Department: [facility] Emergency Department   Date of Visit: 10/30/2020           After Visit Summary Signature Page    I have received my discharge instructions, and my questions have been answered. I have discussed any challenges I see with this plan with the nurse or doctor.    ..........................................................................................................................................  Patient/Patient Representative Signature      ..........................................................................................................................................  Patient Representative Print Name and Relationship to Patient    ..................................................               ................................................  Date                                   Time    ..........................................................................................................................................  Reviewed by Signature/Title    ...................................................              ..............................................  Date                                               Time          22EPIC Rev 08/18        Strong peripheral pulses

## 2020-11-04 ENCOUNTER — HOSPITAL ENCOUNTER (INPATIENT)
Facility: CLINIC | Age: 60
LOS: 14 days | Discharge: PSYCHIATRIC OR SUBSTANCE USE FACILITY | End: 2020-11-19
Attending: EMERGENCY MEDICINE | Admitting: PSYCHIATRY & NEUROLOGY
Payer: COMMERCIAL

## 2020-11-04 DIAGNOSIS — Z20.828 EXPOSURE TO SARS-ASSOCIATED CORONAVIRUS: ICD-10-CM

## 2020-11-04 DIAGNOSIS — R45.851 SUICIDAL IDEATION: ICD-10-CM

## 2020-11-04 DIAGNOSIS — E11.9 INSULIN DEPENDENT TYPE 2 DIABETES MELLITUS (H): ICD-10-CM

## 2020-11-04 DIAGNOSIS — F19.10 ANTIDEPRESSANT TYPE ABUSE, CONTINUOUS (H): ICD-10-CM

## 2020-11-04 DIAGNOSIS — Z79.4 INSULIN DEPENDENT TYPE 2 DIABETES MELLITUS (H): ICD-10-CM

## 2020-11-04 DIAGNOSIS — F20.9 SUBCHRONIC SCHIZOPHRENIA (H): ICD-10-CM

## 2020-11-04 DIAGNOSIS — F33.9 RECURRENT MAJOR DEPRESSIVE DISORDER, REMISSION STATUS UNSPECIFIED (H): ICD-10-CM

## 2020-11-04 LAB
AMPHETAMINES UR QL SCN: POSITIVE
BARBITURATES UR QL: NEGATIVE
BENZODIAZ UR QL: NEGATIVE
CANNABINOIDS UR QL SCN: POSITIVE
COCAINE UR QL: POSITIVE
ETHANOL UR QL SCN: NEGATIVE
OPIATES UR QL SCN: NEGATIVE

## 2020-11-04 PROCEDURE — C9803 HOPD COVID-19 SPEC COLLECT: HCPCS | Performed by: EMERGENCY MEDICINE

## 2020-11-04 PROCEDURE — 80307 DRUG TEST PRSMV CHEM ANLYZR: CPT | Performed by: EMERGENCY MEDICINE

## 2020-11-04 PROCEDURE — 99285 EMERGENCY DEPT VISIT HI MDM: CPT | Mod: 25 | Performed by: EMERGENCY MEDICINE

## 2020-11-04 PROCEDURE — 80320 DRUG SCREEN QUANTALCOHOLS: CPT | Performed by: EMERGENCY MEDICINE

## 2020-11-04 PROCEDURE — 250N000013 HC RX MED GY IP 250 OP 250 PS 637: Performed by: EMERGENCY MEDICINE

## 2020-11-04 PROCEDURE — 99285 EMERGENCY DEPT VISIT HI MDM: CPT | Performed by: EMERGENCY MEDICINE

## 2020-11-04 RX ORDER — OLANZAPINE 10 MG/1
10 TABLET, ORALLY DISINTEGRATING ORAL
Status: COMPLETED | OUTPATIENT
Start: 2020-11-04 | End: 2020-11-04

## 2020-11-04 RX ADMIN — OLANZAPINE 10 MG: 10 TABLET, ORALLY DISINTEGRATING ORAL at 22:50

## 2020-11-04 ASSESSMENT — ENCOUNTER SYMPTOMS
FEVER: 0
ABDOMINAL PAIN: 0
SHORTNESS OF BREATH: 0

## 2020-11-05 ENCOUNTER — TELEPHONE (OUTPATIENT)
Dept: BEHAVIORAL HEALTH | Facility: CLINIC | Age: 60
End: 2020-11-05

## 2020-11-05 LAB — GLUCOSE BLDC GLUCOMTR-MCNC: 169 MG/DL (ref 70–99)

## 2020-11-05 PROCEDURE — 999N001017 HC STATISTIC GLUCOSE BY METER IP

## 2020-11-05 PROCEDURE — 124N000002 HC R&B MH UMMC

## 2020-11-05 PROCEDURE — U0003 INFECTIOUS AGENT DETECTION BY NUCLEIC ACID (DNA OR RNA); SEVERE ACUTE RESPIRATORY SYNDROME CORONAVIRUS 2 (SARS-COV-2) (CORONAVIRUS DISEASE [COVID-19]), AMPLIFIED PROBE TECHNIQUE, MAKING USE OF HIGH THROUGHPUT TECHNOLOGIES AS DESCRIBED BY CMS-2020-01-R: HCPCS | Performed by: EMERGENCY MEDICINE

## 2020-11-05 PROCEDURE — 250N000012 HC RX MED GY IP 250 OP 636 PS 637: Performed by: STUDENT IN AN ORGANIZED HEALTH CARE EDUCATION/TRAINING PROGRAM

## 2020-11-05 PROCEDURE — 250N000013 HC RX MED GY IP 250 OP 250 PS 637: Performed by: STUDENT IN AN ORGANIZED HEALTH CARE EDUCATION/TRAINING PROGRAM

## 2020-11-05 PROCEDURE — 250N000009 HC RX 250: Performed by: STUDENT IN AN ORGANIZED HEALTH CARE EDUCATION/TRAINING PROGRAM

## 2020-11-05 PROCEDURE — 90791 PSYCH DIAGNOSTIC EVALUATION: CPT

## 2020-11-05 RX ORDER — DEXTROSE MONOHYDRATE 25 G/50ML
25-50 INJECTION, SOLUTION INTRAVENOUS
Status: DISCONTINUED | OUTPATIENT
Start: 2020-11-05 | End: 2020-11-19 | Stop reason: HOSPADM

## 2020-11-05 RX ORDER — NALTREXONE HYDROCHLORIDE 50 MG/1
50 TABLET, FILM COATED ORAL DAILY
Status: ON HOLD | COMMUNITY
End: 2020-11-18

## 2020-11-05 RX ORDER — HYDROXYZINE HYDROCHLORIDE 25 MG/1
25 TABLET, FILM COATED ORAL EVERY 4 HOURS PRN
Status: DISCONTINUED | OUTPATIENT
Start: 2020-11-05 | End: 2020-11-19 | Stop reason: HOSPADM

## 2020-11-05 RX ORDER — NICOTINE POLACRILEX 4 MG
15-30 LOZENGE BUCCAL
Status: DISCONTINUED | OUTPATIENT
Start: 2020-11-05 | End: 2020-11-19 | Stop reason: HOSPADM

## 2020-11-05 RX ORDER — GABAPENTIN 400 MG/1
400 CAPSULE ORAL 3 TIMES DAILY
Status: DISCONTINUED | OUTPATIENT
Start: 2020-11-05 | End: 2020-11-19 | Stop reason: HOSPADM

## 2020-11-05 RX ORDER — NALTREXONE HYDROCHLORIDE 50 MG/1
50 TABLET, FILM COATED ORAL DAILY
Status: DISCONTINUED | OUTPATIENT
Start: 2020-11-06 | End: 2020-11-19 | Stop reason: HOSPADM

## 2020-11-05 RX ORDER — ACETAMINOPHEN 325 MG/1
650 TABLET ORAL EVERY 4 HOURS PRN
Status: DISCONTINUED | OUTPATIENT
Start: 2020-11-05 | End: 2020-11-19 | Stop reason: HOSPADM

## 2020-11-05 RX ORDER — OLANZAPINE 10 MG/2ML
10 INJECTION, POWDER, FOR SOLUTION INTRAMUSCULAR
Status: DISCONTINUED | OUTPATIENT
Start: 2020-11-05 | End: 2020-11-19 | Stop reason: HOSPADM

## 2020-11-05 RX ORDER — LIRAGLUTIDE 6 MG/ML
0.6 INJECTION SUBCUTANEOUS
Status: DISCONTINUED | OUTPATIENT
Start: 2020-11-05 | End: 2020-11-19 | Stop reason: HOSPADM

## 2020-11-05 RX ORDER — POLYETHYLENE GLYCOL 3350 17 G
2 POWDER IN PACKET (EA) ORAL
Status: DISCONTINUED | OUTPATIENT
Start: 2020-11-05 | End: 2020-11-19 | Stop reason: HOSPADM

## 2020-11-05 RX ORDER — OLANZAPINE 10 MG/1
10 TABLET ORAL
Status: DISCONTINUED | OUTPATIENT
Start: 2020-11-05 | End: 2020-11-19 | Stop reason: HOSPADM

## 2020-11-05 RX ADMIN — ACETAMINOPHEN 650 MG: 325 TABLET, FILM COATED ORAL at 20:34

## 2020-11-05 RX ADMIN — GABAPENTIN 400 MG: 400 CAPSULE ORAL at 20:34

## 2020-11-05 RX ADMIN — LIRAGLUTIDE 0.6 MG: 6 INJECTION SUBCUTANEOUS at 20:34

## 2020-11-05 RX ADMIN — INSULIN GLARGINE 15 UNITS: 100 INJECTION, SOLUTION SUBCUTANEOUS at 21:51

## 2020-11-05 ASSESSMENT — ACTIVITIES OF DAILY LIVING (ADL)
DRESS: INDEPENDENT
ORAL_HYGIENE: INDEPENDENT
HYGIENE/GROOMING: INDEPENDENT
LAUNDRY: WITH SUPERVISION

## 2020-11-05 ASSESSMENT — MIFFLIN-ST. JEOR: SCORE: 1930.31

## 2020-11-05 NOTE — TELEPHONE ENCOUNTER
Update:     4:50PM- On Call Resident, Ernestina reviewing for 22/Bafrancescazer.      4:54PM- Followed up with ED RN regards to collecting COVID and urine for Utox.  She will get it done now.     COVID is processed.      5:06PM- Ernestina accepts for 22/Bajzer.  Unit and ED notified.            Patient cleared and ready for behavioral bed placement: Yes

## 2020-11-05 NOTE — ED NOTES
Bed: ED16  Expected date: 11/4/20  Expected time: 9:35 PM  Means of arrival: Ambulance  Comments:  Smita 424  60 M  SI
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
## 2020-11-05 NOTE — ED NOTES
Emergency Department Patient Sign-out       Brief HPI and ED course:  Patient is a 60 year old male signed out to me by Dr. Tavarez.  See initial ED Provider note for details of the presentation. In brief, patient with hx of schizophrenia and polysubstance abuse. Patient more disorganized. UDS with cocaine, amphetamines, and marijuana. DEC  recommended monitoring overnight for potential substance clearing, with reassessment in the morning.     Vitals:   Patient Vitals for the past 24 hrs:   BP Temp Temp src Pulse Resp SpO2   11/05/20 0430 119/64 98.2  F (36.8  C) Oral 77 16 100 %       Received Sign-out Plan:    Pending studies include: none      Plan:   - monitor overnight, repeat DEC assessment in the morning    Events after assuming care:  After care was assumed, a focused history and physical was performed. Agree with findings relayed by previous provider.     No acute events overnight. Patient signed out to oncoming provider with plan for repeat DEC assessment this morning, dispo pending updated DEC recommendations after the reassessment.      --  Nacho Melgar MD   Emergency Medicine       Nacho Melgar MD  11/05/20 0702
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
## 2020-11-05 NOTE — ED NOTES
I have performed an in person assessment of the patient. Based on this assessment the patient no longer requires a one on one attendant at this point in time.    Tonio Tavarez MD  11:00 PM  November 4, 2020         Tonio Tavarez MD  11/04/20 2066

## 2020-11-05 NOTE — PHARMACY-ADMISSION MEDICATION HISTORY
Admission Medication History Completed by Pharmacy    See Reasult Admission Navigator for allergy information, preferred outpatient pharmacy and prior to admission medications.     Medication History Sources:     Prescription fill history ([facility]) via [employer] data    Chart Review - 3B discharge summary on 10/20    Changes made to PTA medication list (reason):    Added: naltrexone (filled 10/16 for 30-day supply)    Deleted: None    Changed: None    Additional Information:    Patient states last doses of all medications were either this past week or the week before.    [place] :  Gabapentin 400mg capsules, qty #90 (30 days) last filled 9-    Prior to Admission medications    Medication Sig Last Dose Auth Provider   FLUoxetine (PROZAC) 20 MG capsule Take 1 capsule (20 mg) by mouth daily Past Week Melita Dumont APRN CNP       gabapentin (NEURONTIN) 400 MG capsule Take 1 capsule (400 mg) by mouth 3 times daily     Past Week Melita Dumont APRN CNP   insulin glargine (LANTUS PEN) 100 UNIT/ML pen Inject 15 Units Subcutaneous every 24 hours  Patient taking differently: Inject 15 Units Subcutaneous every 24 hours Takes @ bedtime     Past Week Melita Dumont APRN CNP   liraglutide (VICTOZA) 18 MG/3ML solution Inject 0.6 mg Subcutaneous daily (with dinner)     Past Week Melita Dumont APRN CNP   naltrexone (DEPADE/REVIA) 50 MG tablet     Take 50 mg by mouth daily Past Week Unknown, Entered By History     Date completed: 11/05/20    Medication history completed by:   Desiree Booker, Pharm.D., [facility]  Behavioral Health Inpatient Pharmacist  [facility] Emergency Department  Phone: *[phone] ([employer]) or [phone]

## 2020-11-05 NOTE — ED NOTES
Patient refused COVID swab, MD informed and MD wanted to keep patient with refused swab. Intake updated
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
## 2020-11-05 NOTE — ED NOTES
Emergency Department Patient Sign-out       Brief HPI:  This is a 60 year old male signed out to me by Dr. Melgar.  See initial ED Provider note for details of the presentation.          Patient is medically cleared for admission to a Behavioral Health unit.      The patient is not on a hold.      The patient has not required medication for agitation.    Awaiting Transfer to Mental Health Facility and Awaiting Behavioral Health Assessment (DEC)        Significant Events prior to my assuming care: 60-year-old who presented with suicidal ideation in the setting of polysubstance abuse.      Exam:   Patient Vitals for the past 24 hrs:   BP Temp Temp src Pulse Resp SpO2   11/05/20 0430 119/64 98.2  F (36.8  C) Oral 77 16 100 %           ED RESULTS:   Results for orders placed or performed during the hospital encounter of 11/04/20 (from the past 24 hour(s))   Drug abuse screen 6 urine (tox)     Status: Abnormal    Collection Time: 11/04/20  9:58 PM   Result Value Ref Range    Amphetamine Qual Urine Positive (A) NEG^Negative    Barbiturates Qual Urine Negative NEG^Negative    Benzodiazepine Qual Urine Negative NEG^Negative    Cannabinoids Qual Urine Positive (A) NEG^Negative    Cocaine Qual Urine Positive (A) NEG^Negative    Ethanol Qual Urine Negative NEG^Negative    Opiates Qualitative Urine Negative NEG^Negative       ED MEDICATIONS:   Medications   OLANZapine zydis (zyPREXA) ODT tab 10 mg (10 mg Oral Given 11/4/20 9780)         Impression:    ICD-10-CM    1. Suicidal ideation  R45.851        Plan:    60-year-old with a history of his schizophrenia, depression and polysubstance abuse who presented 13 hours ago in the setting of using cocaine, cannabinoids and amphetamines with suicidal ideation.  Patient has now cleared and the case was discussed at length with behavioral emergency room , please see separate note.  Patient continues to maintain suicidality in the setting of depression with active planning.   He will be admitted to mental health for further evaluation and management.      MD Luis Simpson, Richy BANDA MD  11/05/20 104

## 2020-11-06 LAB
ALBUMIN SERPL-MCNC: 3 G/DL (ref 3.4–5)
ALP SERPL-CCNC: 54 U/L (ref 40–150)
ALT SERPL W P-5'-P-CCNC: 17 U/L (ref 0–70)
ANION GAP SERPL CALCULATED.3IONS-SCNC: 2 MMOL/L (ref 3–14)
AST SERPL W P-5'-P-CCNC: 13 U/L (ref 0–45)
BASOPHILS # BLD AUTO: 0.1 10E9/L (ref 0–0.2)
BASOPHILS NFR BLD AUTO: 1 %
BILIRUB SERPL-MCNC: 0.7 MG/DL (ref 0.2–1.3)
BUN SERPL-MCNC: 7 MG/DL (ref 7–30)
CALCIUM SERPL-MCNC: 8.5 MG/DL (ref 8.5–10.1)
CHLORIDE SERPL-SCNC: 110 MMOL/L (ref 94–109)
CO2 SERPL-SCNC: 28 MMOL/L (ref 20–32)
CREAT SERPL-MCNC: 0.85 MG/DL (ref 0.66–1.25)
DEPRECATED CALCIDIOL+CALCIFEROL SERPL-MC: 14 UG/L (ref 20–75)
DIFFERENTIAL METHOD BLD: ABNORMAL
EOSINOPHIL # BLD AUTO: 0.2 10E9/L (ref 0–0.7)
EOSINOPHIL NFR BLD AUTO: 4.6 %
ERYTHROCYTE [DISTWIDTH] IN BLOOD BY AUTOMATED COUNT: 12.9 % (ref 10–15)
GFR SERPL CREATININE-BSD FRML MDRD: >90 ML/MIN/{1.73_M2}
GLUCOSE BLDC GLUCOMTR-MCNC: 127 MG/DL (ref 70–99)
GLUCOSE BLDC GLUCOMTR-MCNC: 286 MG/DL (ref 70–99)
GLUCOSE SERPL-MCNC: 126 MG/DL (ref 70–99)
HCT VFR BLD AUTO: 38 % (ref 40–53)
HGB BLD-MCNC: 13 G/DL (ref 13.3–17.7)
IMM GRANULOCYTES # BLD: 0 10E9/L (ref 0–0.4)
IMM GRANULOCYTES NFR BLD: 0.2 %
INTERPRETATION ECG - MUSE: NORMAL
LABORATORY COMMENT REPORT: NORMAL
LYMPHOCYTES # BLD AUTO: 2.5 10E9/L (ref 0.8–5.3)
LYMPHOCYTES NFR BLD AUTO: 46.9 %
MCH RBC QN AUTO: 29.9 PG (ref 26.5–33)
MCHC RBC AUTO-ENTMCNC: 34.2 G/DL (ref 31.5–36.5)
MCV RBC AUTO: 87 FL (ref 78–100)
MONOCYTES # BLD AUTO: 0.4 10E9/L (ref 0–1.3)
MONOCYTES NFR BLD AUTO: 8 %
NEUTROPHILS # BLD AUTO: 2.1 10E9/L (ref 1.6–8.3)
NEUTROPHILS NFR BLD AUTO: 39.3 %
NRBC # BLD AUTO: 0 10*3/UL
NRBC BLD AUTO-RTO: 0 /100
PLATELET # BLD AUTO: 200 10E9/L (ref 150–450)
POTASSIUM SERPL-SCNC: 4 MMOL/L (ref 3.4–5.3)
PROT SERPL-MCNC: 6.9 G/DL (ref 6.8–8.8)
RBC # BLD AUTO: 4.35 10E12/L (ref 4.4–5.9)
SARS-COV-2 RNA SPEC QL NAA+PROBE: NEGATIVE
SARS-COV-2 RNA SPEC QL NAA+PROBE: NORMAL
SODIUM SERPL-SCNC: 140 MMOL/L (ref 133–144)
SPECIMEN SOURCE: NORMAL
SPECIMEN SOURCE: NORMAL
T4 FREE SERPL-MCNC: 0.72 NG/DL (ref 0.76–1.46)
TSH SERPL DL<=0.005 MIU/L-ACNC: 0.14 MU/L (ref 0.4–4)
WBC # BLD AUTO: 5.3 10E9/L (ref 4–11)

## 2020-11-06 PROCEDURE — 82306 VITAMIN D 25 HYDROXY: CPT | Performed by: STUDENT IN AN ORGANIZED HEALTH CARE EDUCATION/TRAINING PROGRAM

## 2020-11-06 PROCEDURE — 999N001017 HC STATISTIC GLUCOSE BY METER IP

## 2020-11-06 PROCEDURE — 36415 COLL VENOUS BLD VENIPUNCTURE: CPT | Performed by: STUDENT IN AN ORGANIZED HEALTH CARE EDUCATION/TRAINING PROGRAM

## 2020-11-06 PROCEDURE — 84443 ASSAY THYROID STIM HORMONE: CPT | Performed by: STUDENT IN AN ORGANIZED HEALTH CARE EDUCATION/TRAINING PROGRAM

## 2020-11-06 PROCEDURE — 93010 ELECTROCARDIOGRAM REPORT: CPT | Performed by: INTERNAL MEDICINE

## 2020-11-06 PROCEDURE — 84439 ASSAY OF FREE THYROXINE: CPT | Performed by: STUDENT IN AN ORGANIZED HEALTH CARE EDUCATION/TRAINING PROGRAM

## 2020-11-06 PROCEDURE — 99222 1ST HOSP IP/OBS MODERATE 55: CPT | Mod: AI | Performed by: PSYCHIATRY & NEUROLOGY

## 2020-11-06 PROCEDURE — 93005 ELECTROCARDIOGRAM TRACING: CPT

## 2020-11-06 PROCEDURE — 80053 COMPREHEN METABOLIC PANEL: CPT | Performed by: STUDENT IN AN ORGANIZED HEALTH CARE EDUCATION/TRAINING PROGRAM

## 2020-11-06 PROCEDURE — 124N000002 HC R&B MH UMMC

## 2020-11-06 PROCEDURE — 250N000013 HC RX MED GY IP 250 OP 250 PS 637: Performed by: STUDENT IN AN ORGANIZED HEALTH CARE EDUCATION/TRAINING PROGRAM

## 2020-11-06 PROCEDURE — 85025 COMPLETE CBC W/AUTO DIFF WBC: CPT | Performed by: STUDENT IN AN ORGANIZED HEALTH CARE EDUCATION/TRAINING PROGRAM

## 2020-11-06 RX ORDER — ESCITALOPRAM OXALATE 10 MG/1
10 TABLET ORAL DAILY
Status: DISCONTINUED | OUTPATIENT
Start: 2020-11-06 | End: 2020-11-10

## 2020-11-06 RX ADMIN — GABAPENTIN 400 MG: 400 CAPSULE ORAL at 21:24

## 2020-11-06 RX ADMIN — NALTREXONE HYDROCHLORIDE 50 MG: 50 TABLET, FILM COATED ORAL at 09:01

## 2020-11-06 RX ADMIN — FLUOXETINE 20 MG: 20 CAPSULE ORAL at 09:01

## 2020-11-06 RX ADMIN — LIRAGLUTIDE 0.6 MG: 6 INJECTION SUBCUTANEOUS at 17:53

## 2020-11-06 RX ADMIN — GABAPENTIN 400 MG: 400 CAPSULE ORAL at 13:20

## 2020-11-06 RX ADMIN — GABAPENTIN 400 MG: 400 CAPSULE ORAL at 09:01

## 2020-11-06 RX ADMIN — INSULIN GLARGINE 15 UNITS: 100 INJECTION, SOLUTION SUBCUTANEOUS at 21:24

## 2020-11-06 RX ADMIN — ESCITALOPRAM OXALATE 10 MG: 10 TABLET ORAL at 13:20

## 2020-11-06 ASSESSMENT — ACTIVITIES OF DAILY LIVING (ADL)
ORAL_HYGIENE: INDEPENDENT
HYGIENE/GROOMING: INDEPENDENT
DRESS: INDEPENDENT
DRESS: INDEPENDENT
ORAL_HYGIENE: INDEPENDENT
HYGIENE/GROOMING: INDEPENDENT

## 2020-11-06 NOTE — PLAN OF CARE
"Admission:     Admitted to  22 for increasing suicidal ideation. States stressors are \"people keep messing with me\". Reports he is currently homeless. States he attempted suicide by \"taking some pills\" about 3 months ago. States this is his only past attempt. Reports cocaine use \"whenever I can get it\". Denies IV drug use. (See chart review for further details and hx)    Presents as irritable, anxious, but is cooperative completing admission. Answers are short and lacking detail. Showered upon arrival and is well groomed. [place] is steady. Poor eye contact. Speech is clear and coherent. Denies hallucinations. Offered meal, pt declines. Oriented to room and unit. Encouraged to voice needs, questions, and concerns. Pt verbalizes understanding.   "
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
## 2020-11-06 NOTE — PROGRESS NOTES
Received Pt sleeping in his bed at beginning of shift. Pt was awake at 0200, offered and accepted snacks. Pt is noted to have declined supper upon admission. No behavioral issues, appeared calm. Walked the hallway for a short time and went back to his room. Encouraged to ask for a sleep aid if needed. Slept for the remainder of the night.

## 2020-11-06 NOTE — PROGRESS NOTES
"   11/06/20 8894   Psycho Education   Type of Intervention 1:1 intervention   Response participates with encouragement   Hours 0.5   Treatment Detail Check in   Behavioral Health   Hallucinations denies / not responding to hallucinations   Thinking paranoid   Orientation person: oriented;place: oriented   Memory baseline memory   Insight poor   Judgement impaired   Eye Contact at floor   Affect irritable   Mood irritable   Physical Appearance/Attire attire appropriate to age and situation   Hygiene well groomed   1. Wish to be Dead (Recent) No   2. Non-Specific Active Suicidal Thoughts (Recent) No   Elopement   (None observed)   Activity isolative;withdrawn   Speech clear;coherent   Psychomotor / Gait balanced;steady   Activities of Daily Living   Hygiene/Grooming independent   Oral Hygiene independent   Dress independent   Room Organization independent     Pt spent most of shift in room appearing to sleep. Pt was isolative and withdrawn and appeared irritable upon approach.  Pt did not interact with peers or staff unless prompted to do so. Pt did not attend groups today. Pt denies SI and SIB. Pt rated anxiety and depression at 8. Pt did not want to elaborate on what was causing the anxiety and depression, stating, \"Just things going on.\"  "

## 2020-11-06 NOTE — PROGRESS NOTES
----------------------------------------------------------------------------------------------------------  [facility]  Psychiatric Progress Note  Hospital Day #1    Brody Colindres MRN# [id number]   Age: 60 year old YOB: 1960   Date of Admission: 11/4/2020     Subjective   The patient was discussed with the treatment team and chart notes were reviewed.      One-liner: Brody Colindres is a 60 year old male with a documented history of depression, bipolar disorder, schizophrenia, anxiety, severe polysubstance use disorder (mainly cocaine), PTSD, and T2DM with neuropathy who presented for  evaluation of SI in the context of stressors including recent move, unstable housing, unemployment, substance use (cocaine), and significant history of trauma. Legal status is Voluntary. Assessment is that the current presentation is consistent with evolving differential including manic depressive disorder vs PTSD vs adjustment disorder.     SIO: No  Sleep:  7 hours (11/06/20 0600)  Prescribed Medications: Taken as prescribed   PRN Medications: acetaminophen, glucose **OR** dextrose **OR** glucagon, hydrOXYzine, magnesium hydroxide, nicotine, OLANZapine **OR** OLANZapine     Yesterday's changes:   - Utox on admission positive for amphetamines, cocaine, cannabinoids  - 11/5 CMP grossly WNL; glucose 126  - 11/5 CBC showed slight anemia (RBC 4.35, Hgb 13, Hct 38%)  - 11/5 TSH low (0.14 mU/L, nL 0.40 - 4), free T4 low (0.72 ng/dL, nL 0.76-1.46)  - 11/5 EKG: evidence of previous septal infarct, possible LVH  - VitD labs pending    Overnight nursing notes: On admission, patient was noted as tense and anxious. Patient slept through the night, waking up at 2 AM and accepting a snack before going back to bed.     Staff report: Social work will look into patient's history at his last group home, in addition to his housing history.    Patient interview:  Brody Colindres states that he  "feels \"good\" about being in the hospital today but that he's feeling \"really down.\" He stated, \"I want to get help.\" He was noted as downcast with blunted/restricted affect, cooperative with interview, and appeared frustrated and tired.     Brody endorsed depressive symptoms, and stated \"I'm going through a lot right now\" and \"everything's messed up right now.\" His primary concern is getting help for his SI, and he says, \"I don't know why I'm having these suicidal thoughts.\" He confirmed that he has recently been hospitalized twice for SI. When asked if he had experienced any past episodes of psychosis or artem, he answered, \"No.\" He states that none of the antidepressants he's previously been on seemed to make a difference. He confirmed that he is interested in inpatient CD treatment.     Treatment team discussed a possible plan for him, including starting a new antidepressant, CD treatment placement, and starting a PRN anxiety medication. Patient nodded and expressed his understanding and approval of this plan. Treatment team stressed the importance of patient-centered decision making, which patient acknowledged.    --------------------------  Review of systems:    Patient has no bothersome physical symptoms; patient denies acute concerns.     Objective   /76 (BP Location: Left arm)   Pulse 73   Temp 97.4  F (36.3  C) (Temporal)   Resp 16   Ht 1.854 m (6' 1\")   Wt 105.6 kg (232 lb 14.4 oz)   SpO2 100%   BMI 30.73 kg/m    Weight is 232 lbs 14.4 oz  Body mass index is 30.73 kg/m .    Mental Status Examination:    Oriented to:  Grossly Oriented  General:  Awake  Appearance:  appears stated age, Posture is hunched over in bed and Grooming is adequate  Behavior:  calm, cooperative, engaged and guarded  Eye Contact:  poor due to looking down at the floor and not computer screen for majority of interview  Psychomotor:  fidgeting and rocking no catatonia present  Speech:  soft volume/tone and " "paucity  Language: Fluent in English with appropriate syntax and vocabulary.  Mood:  \"feeling really down\"  Affect:  congruent with mood, restricted, blunted, sad and anxious  Thought Process:  coherent, linear, logical and concrete  Thought Content:  suicidal ideation (passive), No HI, No VH and No AH; No apparent delusions  Associations:  intact  Insight:  good   Judgment:  good   Attention Span:  grossly intact  Concentration:  grossly intact  Recent and Remote Memory:  grossly intact  Fund of Knowledge:  average       Allergies     No Known Allergies     Labs & Imaging     Data   Recent Results (from the past 72 hour(s))   Drug abuse screen 6 urine (tox)    Collection Time: 11/04/20  9:58 PM   Result Value Ref Range    Amphetamine Qual Urine Positive (A) NEG^Negative    Barbiturates Qual Urine Negative NEG^Negative    Benzodiazepine Qual Urine Negative NEG^Negative    Cannabinoids Qual Urine Positive (A) NEG^Negative    Cocaine Qual Urine Positive (A) NEG^Negative    Ethanol Qual Urine Negative NEG^Negative    Opiates Qualitative Urine Negative NEG^Negative   Asymptomatic COVID-19 Virus (Coronavirus) by PCR    Collection Time: 11/05/20  5:03 PM    Specimen: Nasopharyngeal   Result Value Ref Range    COVID-19 Virus PCR to [facility] - Source Nasopharyngeal     COVID-19 Virus PCR to [facility] - Result       Test received-See reflex to IDDL test SARS CoV2 (COVID-19) Virus RT-PCR   Glucose by meter    Collection Time: 11/05/20  9:50 PM   Result Value Ref Range    Glucose 169 (H) 70 - 99 mg/dL   CBC with platelets differential    Collection Time: 11/06/20  7:37 AM   Result Value Ref Range    WBC 5.3 4.0 - 11.0 10e9/L    RBC Count 4.35 (L) 4.4 - 5.9 10e12/L    Hemoglobin 13.0 (L) 13.3 - 17.7 g/dL    Hematocrit 38.0 (L) 40.0 - 53.0 %    MCV 87 78 - 100 fl    MCH 29.9 26.5 - 33.0 pg    MCHC 34.2 31.5 - 36.5 g/dL    RDW 12.9 10.0 - 15.0 %    Platelet Count 200 150 - 450 10e9/L    Diff Method Automated Method     % Neutrophils " 39.3 %    % Lymphocytes 46.9 %    % Monocytes 8.0 %    % Eosinophils 4.6 %    % Basophils 1.0 %    % Immature Granulocytes 0.2 %    Nucleated RBCs 0 0 /100    Absolute Neutrophil 2.1 1.6 - 8.3 10e9/L    Absolute Lymphocytes 2.5 0.8 - 5.3 10e9/L    Absolute Monocytes 0.4 0.0 - 1.3 10e9/L    Absolute Eosinophils 0.2 0.0 - 0.7 10e9/L    Absolute Basophils 0.1 0.0 - 0.2 10e9/L    Abs Immature Granulocytes 0.0 0 - 0.4 10e9/L    Absolute Nucleated RBC 0.0    Comprehensive metabolic panel    Collection Time: 11/06/20  7:37 AM   Result Value Ref Range    Sodium 140 133 - 144 mmol/L    Potassium 4.0 3.4 - 5.3 mmol/L    Chloride 110 (H) 94 - 109 mmol/L    Carbon Dioxide 28 20 - 32 mmol/L    Anion Gap 2 (L) 3 - 14 mmol/L    Glucose 126 (H) 70 - 99 mg/dL    Urea Nitrogen 7 7 - 30 mg/dL    Creatinine 0.85 0.66 - 1.25 mg/dL    GFR Estimate >90 >60 mL/min/[1.73_m2]    GFR Estimate If Black >90 >60 mL/min/[1.73_m2]    Calcium 8.5 8.5 - 10.1 mg/dL    Bilirubin Total 0.7 0.2 - 1.3 mg/dL    Albumin 3.0 (L) 3.4 - 5.0 g/dL    Protein Total 6.9 6.8 - 8.8 g/dL    Alkaline Phosphatase 54 40 - 150 U/L    ALT 17 0 - 70 U/L    AST 13 0 - 45 U/L   TSH with free T4 reflex and/or T3 as indicated    Collection Time: 11/06/20  7:37 AM   Result Value Ref Range    TSH 0.14 (L) 0.40 - 4.00 mU/L   T4 free    Collection Time: 11/06/20  7:37 AM   Result Value Ref Range    T4 Free 0.72 (L) 0.76 - 1.46 ng/dL   Glucose by meter    Collection Time: 11/06/20  8:11 AM   Result Value Ref Range    Glucose 127 (H) 70 - 99 mg/dL     Pending Results      Assessment     Primary psychiatric diagnoses:   # Depressive disorder, unspecified (manic depressive disorder vs PTSD vs adjustment disorder)  # PTSD  # Anxiety  # Cocaine use disorder, severe  # Polysubstance use disorder (amphetamine, cannabis, nicotine)     Secondary psychiatric diagnoses of concern this admission:   # R/O schizophrenic spectrum disorders    Diagnostic Impression:                Brody Chung  Bernadine is a 60 year old male with a documented history of depression, bipolar disorder, schizophrenia, anxiety, polysubstance use disorder, severe (mainly cocaine), PTSD and T2DM with neuropathy who presents to the [facility] on 11/5/2020 for evaluation of suicidal ideation in the context of stressors including recent move, unstable housing, unemployment, and substance use (cocaine). Substance use is likely a contributing factor to their presentation. Psychosocial factors contributing to current presentation include loss of loved ones (son murdered, another son is in death row), trauma while incarcerated, unemployment and unstable housing.                   The patient's definitive diagnosis still evolving and includes manic depressive disorder vs PTSD vs adjustment disorder. Patient denies current or past history of artem or psychosis, so current differential accounts for SI and depressive symptoms. Does not state a history of artem or psychosis outside of substance use.     PTA Medications:   Prior to Admission Medications   Prescriptions Last Dose Informant Patient Reported? Taking?   FLUoxetine (PROZAC) 20 MG capsule Past Week  No Yes   Sig: Take 1 capsule (20 mg) by mouth daily   alcohol swab prep pads   No No   Sig: Use to swab area of injection/nicci as directed.   blood glucose (NO BRAND SPECIFIED) test strip   No No   Sig: Use to test blood sugar 4 times daily or as directed. To accompany: Blood Glucose Monitor Brands: per insurance.   blood glucose calibration (NO BRAND SPECIFIED) solution   No No   Sig: To accompany: Blood Glucose Monitor Brands: per insurance.   blood glucose monitoring (NO BRAND SPECIFIED) meter device kit   No No   Sig: Use to test blood sugar 4 times daily or as directed. Preferred blood glucose meter OR supplies to accompany: Blood Glucose Monitor Brands: per insurance.   gabapentin (NEURONTIN) 400 MG capsule Past Week Self No Yes   Sig: Take 1 capsule (400 mg) by  mouth 3 times daily   insulin glargine (LANTUS PEN) 100 UNIT/ML pen Past Week  No Yes   Sig: Inject 15 Units Subcutaneous every 24 hours   Patient taking differently: Inject 15 Units Subcutaneous every 24 hours Takes @ bedtime   liraglutide (VICTOZA) 18 MG/3ML solution Past Week  No Yes   Sig: Inject 0.6 mg Subcutaneous daily (with dinner)   naltrexone (DEPADE/REVIA) 50 MG tablet Past Week  Yes Yes   Sig: Take 50 mg by mouth daily   thin (NO BRAND SPECIFIED) lancets   No No   Sig: Use with lanceting device. To accompany: Blood Glucose Monitor Brands: per insurance.      Facility-Administered Medications: None        Psychiatric course:   Brody Colindres was admitted to station 22 with attending Melissa Mckinley MD as a voluntary patient, and was treated in the therapeutic milieu with appropriate individual and group therapies. On admission, he was noted as tense, anxious, and guarded. PTA daily fluoxetine 20 mg PO and naltrexone 50 mg PO were continued for depression and substance use disorders.   On hospital day 1, Brody stated that he was happy to be in the hospital but endorsed SI, severe depressive symptoms, and appeared tense and anxious with a restricted/blunted affect. He felt his current medications were not working for him. Escitalopram 10 mg PO QD was started, and fluoxetine was D/Naveed d/t inefficacy and to minimize polypharmacy. Hydroxyzine 25 mg Q4H PRN was started for anxiety. Brody also endorsed interest in entering chemical dependency treatment.                 Brody Colindres continued to meet criteria for inpatient hospitalization medication optimization, inpatient stabilization, and appropriate discharge planning.     Collateral:   None    Medical course:   Brody Colindres was medically cleared by the ED prior to admission to the unit.    Consults:   - None     Plan   Continue medication management while monitoring SI, depressive symptoms. Pursue chemical dependency treatment upon discharge.      Today's Changes:   - Start escitalopram 10 mg PO daily for depression  - Start hydroxyzine 5 mg Q4H PRN for anxiety  - D/C fluoxetine 20 mg daily    Continue:    Psychiatric diagnoses and management:    # Depressive disorder, unspecified type  # PTSD  - Escitalopram 10 mg PO daily    # Anxiety  - Hydroxyzine 5 mg PO Q4H PRN    # Cocaine use disorder, severe  # Polysubstance use disorder (amphetamine, cannabis, nicotine)   - Naltrexone 50 mg PO daily  - CD treatment upon discharge    -- PRN meds; acetaminophen, glucose **OR** dextrose **OR** glucagon, hydrOXYzine, magnesium hydroxide, nicotine, OLANZapine **OR** OLANZapine      Pertinent Medical diagnoses and management:    # T2DM  - Insulin glargine (LANTUS PEN) 100 UNIT/ML, 15 Units Subcutaneous every 24 hours  - Liraglutide (VICTOZA) 18 MG/3ML solution, Inject 0.6 mg Subcutaneous daily (with dinner)  - Gabapentin (NEURONTIN) 400 MG for neuropathic pain   - Hypoglycemia protocol   - Glucose monitoring     # Discontinued Medications (& Rationale):  - Fluoxetine 20 mg daily was D/Naveed 11/6 d/t inefficacy, replaced by escitalopram    Legal Status: Voluntary    Disposition/Anticipated Discharge Date: TBD, pending clinical stabilization, medication optimization, and formulation of a safe discharge plan.     Target symptoms to stabilize: SI and depressed  Target disposition: TBD      Safety Assessment:   Behavioral Orders   Procedures     Code 1 - Restrict to Unit     Routine Programming     As clinically indicated     Status 15     Every 15 minutes.     Suicide precautions     Patients on Suicide Precautions should have a Combination Diet ordered that includes a Diet selection(s) AND a Behavioral Tray selection for Safe Tray - with utensils, or Safe Tray - NO utensils          Patient seen and discussed with my attending physician, Dr. Melissa Mckinley MD who is in agreement with my assessment and plan.      Carmen Mccarty, MS3    Resident Attestation:  I was present with  the medical student who participated in the service and in the documentation of the note. I have verified the history and personally performed the exam and medical decision making. I agree with the assessment and plan of care as documented in the note.    Berta Nam MD  PGY1 Psychiatry Resident    Attestation:  This patient has been seen and evaluated by me, Melissa Mckinley MD.  I have discussed this patient with the house staff team including the resident and medical student and I agree with the findings and plan in this note.    I have reviewed today's vital signs, medications, labs and imaging. Melissa Mckinley MD , PhD.

## 2020-11-06 NOTE — PROGRESS NOTES
11/05/20 1904   Patient Belongings   Did you bring any home meds/supplements to the hospital?  No   Patient Belongings locker   Patient Belongings Put in Hospital Secure Location (Security or Locker, etc.) cash/credit card;clothing;money (see comment);shoes;wallet   Belongings Search Yes   Clothing Search Yes   Second Staff Aguila CRABTREE       Items brought in on admission (11/05/20):  Items in locker: Jeans, t-shirt, socks, belt, shoes w/laces, red coat, gray sweatshirt, black sweatshirt, hat, wallet w/ID, vasoline, chapstick, and cigarettes.   Items in room: None  Items sent to security: Card ending in [phone] and $16.00 cash sent in envelope #[id number]    Note: NO phone or keys found      A               Admission:  I am responsible for any personal items that are not sent to the safe or pharmacy.  [facility] is not responsible for loss, theft or damage of any property in my possession.    Signature:  _________________________________ Date: _______  Time: _____                                              Staff Signature:  ____________________________ Date: ________  Time: _____      2nd Staff person, if patient is unable/unwilling to sign:    Signature: ________________________________ Date: ________  Time: _____     Discharge:  [facility] has returned all of my personal belongings:    Signature: _________________________________ Date: ________  Time: _____                                          Staff Signature:  ____________________________ Date: ________  Time: _____
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
## 2020-11-07 LAB
GLUCOSE BLDC GLUCOMTR-MCNC: 137 MG/DL (ref 70–99)
GLUCOSE BLDC GLUCOMTR-MCNC: 92 MG/DL (ref 70–99)

## 2020-11-07 PROCEDURE — 250N000013 HC RX MED GY IP 250 OP 250 PS 637: Performed by: STUDENT IN AN ORGANIZED HEALTH CARE EDUCATION/TRAINING PROGRAM

## 2020-11-07 PROCEDURE — 124N000002 HC R&B MH UMMC

## 2020-11-07 PROCEDURE — 999N001017 HC STATISTIC GLUCOSE BY METER IP

## 2020-11-07 RX ADMIN — NALTREXONE HYDROCHLORIDE 50 MG: 50 TABLET, FILM COATED ORAL at 08:23

## 2020-11-07 RX ADMIN — ESCITALOPRAM OXALATE 10 MG: 10 TABLET ORAL at 08:23

## 2020-11-07 RX ADMIN — LIRAGLUTIDE 0.6 MG: 6 INJECTION SUBCUTANEOUS at 18:36

## 2020-11-07 RX ADMIN — GABAPENTIN 400 MG: 400 CAPSULE ORAL at 15:05

## 2020-11-07 RX ADMIN — INSULIN GLARGINE 15 UNITS: 100 INJECTION, SOLUTION SUBCUTANEOUS at 21:51

## 2020-11-07 RX ADMIN — GABAPENTIN 400 MG: 400 CAPSULE ORAL at 08:23

## 2020-11-07 RX ADMIN — GABAPENTIN 400 MG: 400 CAPSULE ORAL at 21:51

## 2020-11-07 NOTE — PROGRESS NOTES
Patient has suicidal thoughts and would follow through with suicide if had a plan. Does not currently have a plan. Contracts for safety while in the hospital

## 2020-11-07 NOTE — PLAN OF CARE
BEHAVIORAL TEAM DISCUSSION    Participants:   Melissa Mckinley MD Attending Psychiatrist  Berta Nam MD, PGY1  Christiana Reynolds, MSW, [facility] Clinical Treatment Coordinator  TIN Mckeon, MS3  Nel Abdul, MS3  Chaim Willson, MS3  Progress: initial team meeting  Anticipated length of stay: assessment ongoing  Continued Stay Criteria/Rationale: admitted due to suicidal ideation.   Medical/Physical: per psychiatry progress note.   Pertinent Medical diagnoses and management:     # T2DM  - Insulin glargine (LANTUS PEN) 100 UNIT/ML, 15 Units Subcutaneous every 24 hours  - Liraglutide (VICTOZA) 18 MG/3ML solution, Inject 0.6 mg Subcutaneous daily (with dinner)  - Gabapentin (NEURONTIN) 400 MG for neuropathic pain   - Hypoglycemia protocol   - Glucose monitoring      Precautions:   Behavioral Orders   Procedures    Code 1 - Restrict to Unit    Routine Programming     As clinically indicated    Status 15     Every 15 minutes.    Suicide precautions     Patients on Suicide Precautions should have a Combination Diet ordered that includes a Diet selection(s) AND a Behavioral Tray selection for Safe Tray - with utensils, or Safe Tray - NO utensils       Plan: Psychiatric assessment. Medication management. Therapeutic Milieu. Individual care planning and after care planning. Patient to participate in unit groups and activities. Individual and group support on unit.   Rationale for change in precautions or plan: per initial assessment.

## 2020-11-07 NOTE — PROGRESS NOTES
"   11/06/20 2200   Psycho Education   Type of Intervention 1:1 intervention   Response participates, initiates socially appropriate   Hours 0.5   Treatment Detail check in    Behavioral Health   Hallucinations denies / not responding to hallucinations   Orientation person: oriented;place: oriented   Memory baseline memory   Insight admits / accepts   Judgement impaired   Eye Contact   (eyes closed )   Affect blunted, flat   Mood depressed;hopeless   Physical Appearance/Attire attire appropriate to age and situation   Hygiene neglected grooming - unclean body, hair, teeth   Suicidality other (see comments)  (denies )   1. Wish to be Dead (Recent) Yes   2. Non-Specific Active Suicidal Thoughts (Recent) Yes   Self Injury other (see comment)  (denies )   Activity isolative;withdrawn   Speech clear;coherent   Medication Sensitivity no stated side effects;no observed side effects   Psychomotor / Gait balanced;steady   Activities of Daily Living   Hygiene/Grooming independent   Oral Hygiene independent   Dress independent   Room Organization independent     Pt laying in bed almost entire shift. Pt was a little irritable but was willing to check in. Pt endorsed depression 8/10, and had a hopeless outlook. Pt also reports feeling tired. Pt indicated they are having SI thoughts and have a plan. Pt would not elaborate on their plan. When asked if they had a plan to harm themselves while they were here the patient stated, \"no\".   "

## 2020-11-07 NOTE — PROGRESS NOTES
Patient isolative and withdrawn. Napping better part of the day. Visible briefly late morning but otherwise sleeping. Acclimating to unit. Monitoring.          11/07/20 1426   Behavioral Health   Thinking paranoid;distractable   Orientation person: oriented;place: oriented;date: oriented;time: oriented   Memory baseline memory   Insight insight appropriate to situation;admits / accepts   Judgement impaired   Affect blunted, flat   Mood irritable;depressed   Physical Appearance/Attire attire appropriate to age and situation   Activity isolative;withdrawn   Speech coherent;clear

## 2020-11-08 LAB
GLUCOSE BLDC GLUCOMTR-MCNC: 158 MG/DL (ref 70–99)
GLUCOSE BLDC GLUCOMTR-MCNC: 88 MG/DL (ref 70–99)

## 2020-11-08 PROCEDURE — 250N000013 HC RX MED GY IP 250 OP 250 PS 637: Performed by: STUDENT IN AN ORGANIZED HEALTH CARE EDUCATION/TRAINING PROGRAM

## 2020-11-08 PROCEDURE — 124N000002 HC R&B MH UMMC

## 2020-11-08 PROCEDURE — 999N001017 HC STATISTIC GLUCOSE BY METER IP

## 2020-11-08 RX ADMIN — GABAPENTIN 400 MG: 400 CAPSULE ORAL at 08:17

## 2020-11-08 RX ADMIN — GABAPENTIN 400 MG: 400 CAPSULE ORAL at 20:34

## 2020-11-08 RX ADMIN — ESCITALOPRAM OXALATE 10 MG: 10 TABLET ORAL at 08:17

## 2020-11-08 RX ADMIN — INSULIN GLARGINE 15 UNITS: 100 INJECTION, SOLUTION SUBCUTANEOUS at 20:34

## 2020-11-08 RX ADMIN — GABAPENTIN 400 MG: 400 CAPSULE ORAL at 13:55

## 2020-11-08 RX ADMIN — LIRAGLUTIDE 0.6 MG: 6 INJECTION SUBCUTANEOUS at 18:38

## 2020-11-08 RX ADMIN — NALTREXONE HYDROCHLORIDE 50 MG: 50 TABLET, FILM COATED ORAL at 08:17

## 2020-11-08 ASSESSMENT — ACTIVITIES OF DAILY LIVING (ADL)
HYGIENE/GROOMING: SHOWER;INDEPENDENT
ORAL_HYGIENE: INDEPENDENT
DRESS: SCRUBS (BEHAVIORAL HEALTH)

## 2020-11-08 ASSESSMENT — MIFFLIN-ST. JEOR: SCORE: 1939.83

## 2020-11-08 NOTE — PROVIDER NOTIFICATION
Pt has been in the lounge most of shift watching the election , smiling , happy with the election results  He states he still has suicidal ideation but no plan here in the hospital . Med compliant

## 2020-11-08 NOTE — PLAN OF CARE
"\"I'm the same.\" Patient is flat and blunted.  Depression is at an 8 with 10 high. Denies anxiety. States has racing thoughts \"sometimes.\" Thoughts are clear. Continues to state has suicidal thoughts and would follow through with them if he had a plan. Denies a plan. Feels safe in the hospital, contracts for safety. Has been up more today sitting in the lounge watching TV.  "

## 2020-11-09 LAB
GLUCOSE BLDC GLUCOMTR-MCNC: 84 MG/DL (ref 70–99)
GLUCOSE BLDC GLUCOMTR-MCNC: 98 MG/DL (ref 70–99)

## 2020-11-09 PROCEDURE — 124N000002 HC R&B MH UMMC

## 2020-11-09 PROCEDURE — 999N001017 HC STATISTIC GLUCOSE BY METER IP

## 2020-11-09 PROCEDURE — 99232 SBSQ HOSP IP/OBS MODERATE 35: CPT | Mod: GC | Performed by: PSYCHIATRY & NEUROLOGY

## 2020-11-09 PROCEDURE — 250N000013 HC RX MED GY IP 250 OP 250 PS 637: Performed by: STUDENT IN AN ORGANIZED HEALTH CARE EDUCATION/TRAINING PROGRAM

## 2020-11-09 PROCEDURE — H2032 ACTIVITY THERAPY, PER 15 MIN: HCPCS

## 2020-11-09 RX ORDER — VITAMIN B COMPLEX
50 TABLET ORAL DAILY
Status: DISCONTINUED | OUTPATIENT
Start: 2020-11-09 | End: 2020-11-19 | Stop reason: HOSPADM

## 2020-11-09 RX ADMIN — GABAPENTIN 400 MG: 400 CAPSULE ORAL at 20:22

## 2020-11-09 RX ADMIN — INSULIN GLARGINE 15 UNITS: 100 INJECTION, SOLUTION SUBCUTANEOUS at 20:23

## 2020-11-09 RX ADMIN — GABAPENTIN 400 MG: 400 CAPSULE ORAL at 13:48

## 2020-11-09 RX ADMIN — ESCITALOPRAM OXALATE 10 MG: 10 TABLET ORAL at 08:38

## 2020-11-09 RX ADMIN — GABAPENTIN 400 MG: 400 CAPSULE ORAL at 08:38

## 2020-11-09 RX ADMIN — Medication 50 MCG: at 13:48

## 2020-11-09 RX ADMIN — LIRAGLUTIDE 0.6 MG: 6 INJECTION SUBCUTANEOUS at 18:37

## 2020-11-09 RX ADMIN — NALTREXONE HYDROCHLORIDE 50 MG: 50 TABLET, FILM COATED ORAL at 08:38

## 2020-11-09 ASSESSMENT — ACTIVITIES OF DAILY LIVING (ADL)
HYGIENE/GROOMING: INDEPENDENT
ORAL_HYGIENE: INDEPENDENT
ORAL_HYGIENE: INDEPENDENT
DRESS: SCRUBS (BEHAVIORAL HEALTH)
HYGIENE/GROOMING: INDEPENDENT
LAUNDRY: WITH SUPERVISION
DRESS: INDEPENDENT

## 2020-11-09 NOTE — PROGRESS NOTES
"Pt appears calm and in deep thoughts. Pt on and of the unit. Pt expressed feeling anxious (9)  and depressed (6) with an active SI but feels safe while in here. Pt denies SIB. Writer asked pt what are some coping skills he uses to help him get through the feelings expressed and pt was responding with \"get high. Which is also something that I am trying to stop but it's hard when you're dealing with the things that I'm dealing with\"        11/09/20 1148   Behavioral Health   Hallucinations denies / not responding to hallucinations   Thinking intact   Orientation person: oriented;place: oriented;date: oriented;time: oriented   Memory baseline memory   Insight poor   Judgement impaired   Eye Contact cultural specific   Affect blunted, flat   Mood depressed   Physical Appearance/Attire appears stated age;attire appropriate to age and situation;neat   Suicidality active   1. Wish to be Dead (Recent) Yes   2. Non-Specific Active Suicidal Thoughts (Recent) No   Self Injury   (denies)   Speech clear;coherent   Psychomotor / Gait balanced;steady   Activities of Daily Living   Hygiene/Grooming independent   Oral Hygiene independent   Dress independent   Room Organization independent     "
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
## 2020-11-09 NOTE — PROVIDER NOTIFICATION
Pt states he is feeling suicidal but contracts for safety and states he has no plan here in the hospital , Pt does not appear depressed . He has been laughing and joking and [place]  [place] [facility] [place]
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
## 2020-11-10 LAB — GLUCOSE BLDC GLUCOMTR-MCNC: 85 MG/DL (ref 70–99)

## 2020-11-10 PROCEDURE — 99232 SBSQ HOSP IP/OBS MODERATE 35: CPT | Mod: GC | Performed by: PSYCHIATRY & NEUROLOGY

## 2020-11-10 PROCEDURE — 250N000013 HC RX MED GY IP 250 OP 250 PS 637: Performed by: STUDENT IN AN ORGANIZED HEALTH CARE EDUCATION/TRAINING PROGRAM

## 2020-11-10 PROCEDURE — 124N000002 HC R&B MH UMMC

## 2020-11-10 PROCEDURE — 999N001017 HC STATISTIC GLUCOSE BY METER IP

## 2020-11-10 RX ORDER — TRAZODONE HYDROCHLORIDE 100 MG/1
100 TABLET ORAL AT BEDTIME
Status: DISCONTINUED | OUTPATIENT
Start: 2020-11-10 | End: 2020-11-19 | Stop reason: HOSPADM

## 2020-11-10 RX ORDER — ESCITALOPRAM OXALATE 5 MG/1
5 TABLET ORAL ONCE
Status: COMPLETED | OUTPATIENT
Start: 2020-11-10 | End: 2020-11-10

## 2020-11-10 RX ADMIN — ESCITALOPRAM OXALATE 10 MG: 10 TABLET ORAL at 08:27

## 2020-11-10 RX ADMIN — INSULIN GLARGINE 15 UNITS: 100 INJECTION, SOLUTION SUBCUTANEOUS at 21:56

## 2020-11-10 RX ADMIN — NALTREXONE HYDROCHLORIDE 50 MG: 50 TABLET, FILM COATED ORAL at 08:27

## 2020-11-10 RX ADMIN — ESCITALOPRAM 5 MG: 5 TABLET, FILM COATED ORAL at 13:20

## 2020-11-10 RX ADMIN — LIRAGLUTIDE 0.6 MG: 6 INJECTION SUBCUTANEOUS at 18:33

## 2020-11-10 RX ADMIN — GABAPENTIN 400 MG: 400 CAPSULE ORAL at 13:20

## 2020-11-10 RX ADMIN — Medication 50 MCG: at 08:27

## 2020-11-10 RX ADMIN — GABAPENTIN 400 MG: 400 CAPSULE ORAL at 08:27

## 2020-11-10 RX ADMIN — GABAPENTIN 400 MG: 400 CAPSULE ORAL at 21:55

## 2020-11-10 RX ADMIN — TRAZODONE HYDROCHLORIDE 100 MG: 100 TABLET ORAL at 21:55

## 2020-11-10 ASSESSMENT — ACTIVITIES OF DAILY LIVING (ADL)
DRESS: INDEPENDENT
HYGIENE/GROOMING: INDEPENDENT
LAUNDRY: UNABLE TO COMPLETE
LAUNDRY: WITH SUPERVISION
HYGIENE/GROOMING: INDEPENDENT
ORAL_HYGIENE: INDEPENDENT
ORAL_HYGIENE: INDEPENDENT
DRESS: INDEPENDENT
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
## 2020-11-10 NOTE — PLAN OF CARE
Work Completed  - chart review  - team meeting  - post team rounds team meeting / discussion  - provider recommends that patient have another assessment for CD placement and to remain in hospital to complete this process and residential programming direct discharge.   - met with patient to discuss possible assessment - reports he is wanting help getting into treatment - signed TENZNI for [facility] for this writer to obtain discharge summary  - patient given packet to complete for prior to meeting with CD  - is working on paperwork now.  - provider instructed to place CD assessment order.   - called [facility] confirmed fax and informed them I am sending a fax/tenzin with request for discharge summary        Discharge plan or goal  CD Assessment to be completed for exploration of residential MICD placement.     Barriers to discharge  Symptoms present requiring ongoing medication management - assessment for treatment recommendations to be completed

## 2020-11-10 NOTE — PROGRESS NOTES
Pt had an okay shift. Pt was visible and present in the milieu for the majority of the shift, watched TV, socialized minimally with peers and staff, and attended group. Pt presents with a blunted/flat affect and a calm mood. Pt does present with irritability at times but quickly returns to baseline. During check in, pt endorsed passive SI, urges to self harm and rated his depression as a 6/10. Pt was able to contract for safety and was encouraged to reach out to staff should his depressive sxs get any worse. During conversation, pt presents as paranoid and confused at times. Pt continued to ask this writer why they were asking him questions and what this was all for. Pt presented with delayed responses and appeared to have difficulty tracking the conversation. Pt showed minimal eye contact with this writer and instead stared at the floor or into space. Writer will continue to monitor.        11/09/20 2200   Psycho Education   Type of Intervention 1:1 intervention   Response participates, initiates socially appropriate   Hours 0.5   Treatment Detail Check in   Behavioral Health   Hallucinations denies / not responding to hallucinations   Thinking paranoid;confused   Orientation person: oriented;place: oriented;date: oriented;time: oriented   Memory baseline memory   Insight insight appropriate to situation   Judgement impaired   Eye Contact into space;at floor   Affect blunted, flat   Mood mood is calm;irritable   1. Wish to be Dead (Recent) Yes   2. Non-Specific Active Suicidal Thoughts (Recent) Yes   Activities of Daily Living   Hygiene/Grooming independent   Oral Hygiene independent   Dress scrubs (behavioral health)   Laundry with supervision   Room Organization independent

## 2020-11-10 NOTE — PLAN OF CARE
"  Problem: General Rehab Plan of Care  Goal: Therapeutic Recreation/Music Therapy Goal (Art Therapy)  Description: The patient and/or their representative will achieve their patient-specific goals related to the plan of care.  The patient-specific goals include: emotional expression, emotional regulation, trauma containment  Outcome: No Change  Art Therapy directive was to create an image of a safe place and to identify five items within safe place that represent each of the five senses. Goals of directive: emotional expression, emotional regulation, trauma containment.   Pt was a quiet participant, observed with depressed mood, slouched body posture. Pt was initially hesitant to draw, saying that he had never had an art class. Pt did eventually begin drawing after about ten minutes, and created a drawing with colored pencil. Pt briefly shared with group.  Pt identified his safe place as the present, in the hospital.  Pt clover an image of his bed in his room here on the unit. Pt also filled out the five senses sheet and identified sensory items on the unit, such as the \"sounds of the TV, smelling clean, seeing people walking about.\"  Pts mood was calm.     "

## 2020-11-10 NOTE — PROVIDER NOTIFICATION
Pt enjoying watching football in the [place] . He states he is  Still feeling suicidal but has no plan here in the hospital and contracts for safety , Smiling , pleasant calm . Med compliant

## 2020-11-11 LAB — GLUCOSE BLDC GLUCOMTR-MCNC: 178 MG/DL (ref 70–99)

## 2020-11-11 PROCEDURE — 124N000002 HC R&B MH UMMC

## 2020-11-11 PROCEDURE — 999N001017 HC STATISTIC GLUCOSE BY METER IP

## 2020-11-11 PROCEDURE — 250N000013 HC RX MED GY IP 250 OP 250 PS 637: Performed by: STUDENT IN AN ORGANIZED HEALTH CARE EDUCATION/TRAINING PROGRAM

## 2020-11-11 PROCEDURE — 99232 SBSQ HOSP IP/OBS MODERATE 35: CPT | Mod: GC | Performed by: PSYCHIATRY & NEUROLOGY

## 2020-11-11 PROCEDURE — H2032 ACTIVITY THERAPY, PER 15 MIN: HCPCS

## 2020-11-11 RX ADMIN — INSULIN GLARGINE 15 UNITS: 100 INJECTION, SOLUTION SUBCUTANEOUS at 20:31

## 2020-11-11 RX ADMIN — LIRAGLUTIDE 0.6 MG: 6 INJECTION SUBCUTANEOUS at 18:56

## 2020-11-11 RX ADMIN — Medication 50 MCG: at 09:00

## 2020-11-11 RX ADMIN — GABAPENTIN 400 MG: 400 CAPSULE ORAL at 20:32

## 2020-11-11 RX ADMIN — GABAPENTIN 400 MG: 400 CAPSULE ORAL at 14:51

## 2020-11-11 RX ADMIN — GABAPENTIN 400 MG: 400 CAPSULE ORAL at 09:00

## 2020-11-11 RX ADMIN — NALTREXONE HYDROCHLORIDE 50 MG: 50 TABLET, FILM COATED ORAL at 09:01

## 2020-11-11 RX ADMIN — TRAZODONE HYDROCHLORIDE 100 MG: 100 TABLET ORAL at 20:36

## 2020-11-11 RX ADMIN — ESCITALOPRAM OXALATE 15 MG: 10 TABLET ORAL at 09:00

## 2020-11-11 ASSESSMENT — ACTIVITIES OF DAILY LIVING (ADL)
ORAL_HYGIENE: INDEPENDENT
ORAL_HYGIENE: INDEPENDENT
DRESS: INDEPENDENT;SCRUBS (BEHAVIORAL HEALTH)
DRESS: INDEPENDENT
LAUNDRY: WITH SUPERVISION
LAUNDRY: UNABLE TO COMPLETE
HYGIENE/GROOMING: INDEPENDENT
HYGIENE/GROOMING: INDEPENDENT

## 2020-11-11 NOTE — PLAN OF CARE
Patient spends much of shift in the lounge. He enjoys watching movies and socializing with others. His thoughts are clear and organized. Affect is full range and mood is stable. He is compliant with medications. Denies SI and SIB. Crystal Phillips RN

## 2020-11-11 NOTE — PROGRESS NOTES
"    ----------------------------------------------------------------------------------------------------------  [facility]  Psychiatric Progress Note  Hospital Day #6    Brody Cloindres MRN# [id number]   Age: 60 year old YOB: 1960   Date of Admission: 11/4/2020     Subjective   The patient was discussed with the treatment team and chart notes were reviewed.      One-liner: Brody Colindres is a 60 year old male with a documented history of depression, bipolar disorder, schizophrenia, anxiety, severe polysubstance use disorder (mainly cocaine), PTSD, and T2DM with neuropathy who presented for  evaluation of SI in the context of stressors including recent move, unstable housing, unemployment, substance use (cocaine), and significant history of trauma. Legal status is Voluntary. Assessment is that the current presentation is consistent with evolving differential most likely major depressive disorder and symptoms of PTSD vs adjustment disorder.     Sleep:  7.5 hours (11/11/20 0600)  Prescribed Medications: Taken as prescribed   PRN Medications: acetaminophen, glucose **OR** dextrose **OR** glucagon, hydrOXYzine, magnesium hydroxide, nicotine, OLANZapine **OR** OLANZapine     Yesterday's changes:   - Vit D Supplement  - Rule 25 (had to do another evaluation)    Overnight nursing notes:   Noted as pleasant with full/broad affect. Denied SI.    Staff report: No updates.     Patient interview:  Brody Colindres reports feeling overall \"good\". His mood is \"okay/better\" denies any suicidal ideation since yesterday. He state that he was able to sleep all night without waking, which he is very happy about. Woke up at 9 am and laid in bed for a few minutes before deciding to get up from bed. Denies any feelings for grogginess from the Trazodone.     As for his future CD treatment, he recalls that he was removed from the facility after being falsely accused of having an " "altercation with other residents of the facility.  He is agreeable to going to another facility if needed.     Still struggling with determining the reality of his possible pursuers. He is attempting not to think about it anymore.     --------------------------  Review of systems:    Patient has no bothersome physical symptoms; patient denies acute concerns.     Objective   BP (!) 148/85 (BP Location: Left arm)   Pulse 68   Temp 97.6  F (36.4  C) (Tympanic)   Resp 12   Ht 1.854 m (6' 1\")   Wt 106.6 kg (235 lb)   SpO2 100%   BMI 31.00 kg/m    Weight is 235 lbs 0 oz  Body mass index is 31 kg/m .    Mental Status Examination:    Oriented to:  Grossly Oriented  General:  Awake  Appearance:  appears stated age, Posture is hunched over in bed and Grooming is adequate  Behavior:  calm, cooperative, engaged and guarded on occasion  Eye Contact:  poor due to looking down at the floor and not computer screen for majority of interview  Psychomotor:  fidgeting and rocking no catatonia present  Speech:  soft volume/tone and paucity  Language: Fluent in English with appropriate syntax and vocabulary.  Mood:  \"okay, better\"  Affect:  congruent with mood, stable, broad/full, sad and anxious  Thought Process:  coherent, linear, logical and concrete  Thought Content:  suicidal ideation (passive), No HI, No VH and No AH; No apparent delusions  Associations:  intact  Insight:  fair due to recognition of illness though at times vague and unclear if he has experienced psychosis or not    Judgment:  partial due to status as voluntary and seeking help, though has previous pattern of admits then CD treatment then leaving or being kicked out   Attention Span:  grossly intact  Concentration:  grossly intact  Recent and Remote Memory:  not formally assessed  Fund of Knowledge:  average       Allergies     No Known Allergies     Labs & Imaging     Data   Recent Results (from the past 72 hour(s))   Glucose by meter    Collection Time: " 11/08/20  4:15 PM   Result Value Ref Range    Glucose 158 (H) 70 - 99 mg/dL   Glucose by meter    Collection Time: 11/09/20  8:05 AM   Result Value Ref Range    Glucose 98 70 - 99 mg/dL   Glucose by meter    Collection Time: 11/09/20  4:18 PM   Result Value Ref Range    Glucose 84 70 - 99 mg/dL   Glucose by meter    Collection Time: 11/10/20  8:16 AM   Result Value Ref Range    Glucose 85 70 - 99 mg/dL     Pending Results      Assessment     Primary psychiatric diagnoses:   # Depressive disorder, unspecified (manic depressive disorder vs PTSD vs adjustment disorder)    Secondary psychiatric diagnoses of concern this admission:   # PTSD  # Anxiety  # Cocaine use disorder, severe  # Polysubstance use disorder (amphetamine, cannabis, nicotine)     Diagnostic Impression:                Brody Colindres is a 60 year old male with a documented history of depression, bipolar disorder, schizophrenia, anxiety, polysubstance use disorder, severe (mainly cocaine), PTSD and T2DM with neuropathy who presents to the [facility] on 11/5/2020 for evaluation of suicidal ideation in the context of stressors including recent move, unstable housing, unemployment, and substance use (cocaine). Substance use is likely a contributing factor to their presentation. Psychosocial factors contributing to current presentation include loss of loved ones (son murdered, another son is in death row), trauma while incarcerated, unemployment and unstable housing.                   The patient's definitive diagnosis still evolving and includes manic depressive disorder vs PTSD vs adjustment disorder. Patient denies current or past history of artem or psychosis, so current differential accounts for SI and depressive symptoms. Does not state a history of artem or psychosis outside of substance use.     PTA Medications:   Prior to Admission Medications   Prescriptions Last Dose Informant Patient Reported? Taking?   FLUoxetine (PROZAC)  20 MG capsule Past Week  No Yes   Sig: Take 1 capsule (20 mg) by mouth daily   alcohol swab prep pads   No No   Sig: Use to swab area of injection/nicci as directed.   blood glucose (NO BRAND SPECIFIED) test strip   No No   Sig: Use to test blood sugar 4 times daily or as directed. To accompany: Blood Glucose Monitor Brands: per insurance.   blood glucose calibration (NO BRAND SPECIFIED) solution   No No   Sig: To accompany: Blood Glucose Monitor Brands: per insurance.   blood glucose monitoring (NO BRAND SPECIFIED) meter device kit   No No   Sig: Use to test blood sugar 4 times daily or as directed. Preferred blood glucose meter OR supplies to accompany: Blood Glucose Monitor Brands: per insurance.   gabapentin (NEURONTIN) 400 MG capsule Past Week Self No Yes   Sig: Take 1 capsule (400 mg) by mouth 3 times daily   insulin glargine (LANTUS PEN) 100 UNIT/ML pen Past Week  No Yes   Sig: Inject 15 Units Subcutaneous every 24 hours   Patient taking differently: Inject 15 Units Subcutaneous every 24 hours Takes @ bedtime   liraglutide (VICTOZA) 18 MG/3ML solution Past Week  No Yes   Sig: Inject 0.6 mg Subcutaneous daily (with dinner)   naltrexone (DEPADE/REVIA) 50 MG tablet Past Week  Yes Yes   Sig: Take 50 mg by mouth daily   thin (NO BRAND SPECIFIED) lancets   No No   Sig: Use with lanceting device. To accompany: Blood Glucose Monitor Brands: per insurance.      Facility-Administered Medications: None        Psychiatric course:   Brody Colindres was admitted to station 22 with attending Melissa Mckinley MD as a voluntary patient, and was treated in the therapeutic milieu with appropriate individual and group therapies. On admission, he was noted as tense, anxious, and guarded. PTA daily fluoxetine 20 mg PO and naltrexone 50 mg PO were continued for depression and substance use disorders.   On hospital day 1, Brody stated that he was happy to be in the hospital but endorsed SI, severe depressive symptoms, and appeared  tense and anxious with a restricted/blunted affect. He felt his current medications were not working for him. Escitalopram 10 mg PO QD was started, and fluoxetine was D/Naveed d/t inefficacy and to minimize polypharmacy. Hydroxyzine 25 mg Q4H PRN was started for anxiety. Brody also endorsed interest in entering chemical dependency treatment.   On 11/10/2020, pt reporting increased difficulty with sleep both falling asleep and staying asleep. Still reporting unchanged thoughts of suicide with no plan. Was increasingly conflicted about possible paranoia/hallucinations with respect to individuals he claimed were pursuing him. Escitalopram increased to 15 mg and started trazodone 100 mg at bedtime for sleep.    11/11/2020, pt reports slight improvement in sleep and mood. Denying suicidal ideation to staff and mental health team.                    Brody Colindres continued to meet criteria for inpatient hospitalization medication optimization, inpatient stabilization, and appropriate discharge planning.     Collateral:   None    Medical course:   Brody Colindres was medically cleared by the ED prior to admission to the unit.    Consults:   - None     Plan   Continue medication management while monitoring SI, depressive symptoms. Pursue chemical dependency treatment upon discharge.     Today's Changes:   - CD inpatient consult placed, awaiting updates from [facility]      Continue:    Psychiatric diagnoses and management:    # Depressive disorder, unspecified type  # Anxiety  # PTSD  - Escitalopram 15 mg PO daily  - Continue Hydroxyzine 5 mg PO Q4H PRN    # Cocaine use disorder, severe  # Polysubstance use disorder (amphetamine, cannabis, nicotine)   - Naltrexone 50 mg PO daily  - CD consult    -- PRN meds; acetaminophen, glucose **OR** dextrose **OR** glucagon, hydrOXYzine, magnesium hydroxide, nicotine, OLANZapine **OR** OLANZapine      Pertinent Medical diagnoses and management:    # Insomnia  - Continue trazodone  100 mg PO at bedtime for sleep    # T2DM  - Insulin glargine (LANTUS PEN) 100 UNIT/ML, 15 Units Subcutaneous every 24 hours  - Liraglutide (VICTOZA) 18 MG/3ML solution, Inject 0.6 mg Subcutaneous daily (with dinner)  - Gabapentin (NEURONTIN) 400 MG for neuropathic pain   - Hypoglycemia protocol   - Glucose monitoring     # Vitamin D deficiency  - D3 50 mcg daily       Discontinued Medications (& Rationale):  - Fluoxetine 20 mg daily was D/Naveed 11/6 d/t inefficacy, replaced by escitalopram      Legal Status: Voluntary      Disposition/Anticipated Discharge Date: TBD, pending clinical stabilization, medication optimization, and formulation of a safe discharge plan.       Safety Assessment:   Behavioral Orders   Procedures     Code 1 - Restrict to Unit     Routine Programming     As clinically indicated     Rule 25     Status 15     Every 15 minutes.     Suicide precautions     Patients on Suicide Precautions should have a Combination Diet ordered that includes a Diet selection(s) AND a Behavioral Tray selection for Safe Tray - with utensils, or Safe Tray - NO utensils          Patient seen and discussed with my attending physician, Dr. Melissa Mckilney MD who is in agreement with my assessment and plan.    Raheem Krause, MS3    Resident Attestation:  I was present with the medical student who participated in the service and in the documentation of the note. I have verified the history and personally performed the exam and medical decision making. I agree with the assessment and plan of care as documented in the note.    Berta Nam MD  PGY1 Psychiatry Resident      Attestation:  This patient has been seen and evaluated by me, Melissa Mckinley MD.  I have discussed this patient with the house staff team including the resident and medical student and I agree with the findings and plan in this note.    I have reviewed today's vital signs, medications, labs and imaging. eMlissa Mckinley MD , PhD.

## 2020-11-11 NOTE — PLAN OF CARE
Patient spends time  out in milieu. He is social with peers. His mood is stable and affect is full range. He attends and participates in groups. Patient is compliant with meds. Thoughts are clear and organized. He denies SI and SIB. Crystal Phillips RN'

## 2020-11-11 NOTE — CONSULTS
11/10/20 CD consult acknowledged  Pt completed CD paperwork. CTC requested discharge summary from [facility] - a CD  will complete Rule 25 Update upon receiving discharge summary.    Lucero Barahona [facility] HospitalMARIA DE JESUS [email or web address]  [phone]

## 2020-11-11 NOTE — PLAN OF CARE
Work Completed  - chart review  - team meeting  - re CD assessment / records request [facility] for [facility] - this writer called and spoke with staff at program - while on phone they re checked fax records - confirmed receipt of records request and report will send requested information now.   Fax received from [facility] - sent case notes from 10/24/20 (date discharged) and 10/20/2020 - said that the discharge summary was not yet done in file - has reached out to the counselor asking to complete it - counselor works this evening is hoping they will get it completed for her to send to me tomorrow.       Discharge plan or goal  CD Assessment to be completed for exploration of residential MICD placement.     Barriers to discharge  Symptoms present requiring ongoing medication management - assessment for treatment recommendations to be completed

## 2020-11-11 NOTE — PROGRESS NOTES
Pt spent the majority of the night in the milieu watching TV with peers. Pt presented with a full range affect and his mood was calm. Pt enjoyed watching the Wheel of Fortune and guessing the answers this evening. Pt stated he loves to play scramble. Writer told pt that game is on the unit and is available to play when he would like. Pt was excited to hear that and said he might play it tomorrow. Pt ate dinner with peers and picked out a movie to watch this evening. Pt had pleasant and appropriate interactions with staff throughout the night. Pt denies SIB and HI. Pt endorses recent SI although denies any this evening. Pt contracts for safety.        11/10/20 2049   Psycho Education   Type of Intervention 1:1 intervention   Response participates, initiates socially appropriate   Hours 0.5   Treatment Detail Check-In   Behavioral Health   Hallucinations denies / not responding to hallucinations   Thinking intact   Orientation person: oriented;place: oriented;date: oriented;time: oriented   Memory baseline memory   Insight insight appropriate to situation   Judgement impaired   Eye Contact at examiner   Affect full range affect   Mood mood is calm   Physical Appearance/Attire attire appropriate to age and situation   Hygiene well groomed   1. Wish to be Dead (Recent) Yes   2. Non-Specific Active Suicidal Thoughts (Recent) No   Activity other (see comment)  (usual, around milieu)   Speech clear;coherent   Medication Sensitivity no stated side effects;no observed side effects   Psychomotor / Gait balanced;steady   Activities of Daily Living   Hygiene/Grooming independent   Oral Hygiene independent   Dress independent   Laundry unable to complete   Room Organization independent

## 2020-11-12 LAB
GLUCOSE BLDC GLUCOMTR-MCNC: 108 MG/DL (ref 70–99)
GLUCOSE BLDC GLUCOMTR-MCNC: 92 MG/DL (ref 70–99)

## 2020-11-12 PROCEDURE — G0177 OPPS/PHP; TRAIN & EDUC SERV: HCPCS

## 2020-11-12 PROCEDURE — 124N000002 HC R&B MH UMMC

## 2020-11-12 PROCEDURE — 250N000013 HC RX MED GY IP 250 OP 250 PS 637: Performed by: STUDENT IN AN ORGANIZED HEALTH CARE EDUCATION/TRAINING PROGRAM

## 2020-11-12 PROCEDURE — 99231 SBSQ HOSP IP/OBS SF/LOW 25: CPT | Mod: GC | Performed by: PSYCHIATRY & NEUROLOGY

## 2020-11-12 PROCEDURE — 999N001017 HC STATISTIC GLUCOSE BY METER IP

## 2020-11-12 RX ADMIN — GABAPENTIN 400 MG: 400 CAPSULE ORAL at 14:54

## 2020-11-12 RX ADMIN — GABAPENTIN 400 MG: 400 CAPSULE ORAL at 20:26

## 2020-11-12 RX ADMIN — Medication 50 MCG: at 08:40

## 2020-11-12 RX ADMIN — NALTREXONE HYDROCHLORIDE 50 MG: 50 TABLET, FILM COATED ORAL at 08:40

## 2020-11-12 RX ADMIN — INSULIN GLARGINE 15 UNITS: 100 INJECTION, SOLUTION SUBCUTANEOUS at 20:26

## 2020-11-12 RX ADMIN — GABAPENTIN 400 MG: 400 CAPSULE ORAL at 08:40

## 2020-11-12 RX ADMIN — TRAZODONE HYDROCHLORIDE 100 MG: 100 TABLET ORAL at 20:31

## 2020-11-12 RX ADMIN — ESCITALOPRAM OXALATE 15 MG: 10 TABLET ORAL at 08:40

## 2020-11-12 RX ADMIN — LIRAGLUTIDE 0.6 MG: 6 INJECTION SUBCUTANEOUS at 18:35

## 2020-11-12 ASSESSMENT — ACTIVITIES OF DAILY LIVING (ADL)
DRESS: INDEPENDENT
HYGIENE/GROOMING: INDEPENDENT
LAUNDRY: WITH SUPERVISION
ORAL_HYGIENE: INDEPENDENT

## 2020-11-12 ASSESSMENT — MIFFLIN-ST. JEOR: SCORE: 1956.16

## 2020-11-12 NOTE — PLAN OF CARE
Brody is up for meals and attends groups. He is in milieu  watching tv much of shift. Thoughts are clear and organized. Denies any psychotic symptoms. Denies SI and SIB. Crystal Phillips RN

## 2020-11-12 NOTE — PLAN OF CARE
Pt spends most of the shift in the lounge watching tv . He denies suicidal ideation, Smiling , pleasant , med compliant

## 2020-11-12 NOTE — PROGRESS NOTES
Pt presents with full range affect and calm. Social in the milieu. Ate meals.       11/11/20 2100   Behavioral Health   Hallucinations denies / not responding to hallucinations   Thinking intact   Orientation person: oriented;place: oriented;date: oriented;time: oriented   Memory baseline memory   Insight insight appropriate to situation   Judgement intact   Eye Contact at examiner   Affect full range affect   Mood mood is calm   Physical Appearance/Attire attire appropriate to age and situation   Hygiene well groomed   1. Wish to be Dead (Recent) No   2. Non-Specific Active Suicidal Thoughts (Recent) No   Activity other (see comment)  (Social in the milieu)   Speech clear;coherent   Psychomotor / Gait balanced;steady   Activities of Daily Living   Hygiene/Grooming independent   Oral Hygiene independent   Dress independent;scrubs (behavioral health)   Laundry with supervision   Room Organization independent

## 2020-11-12 NOTE — PROGRESS NOTES
Pt had a group shift, he was visible in the milieu throughout the shift. Pt attended and participated in groups. Pt does not appear to be responding to any internal stimulus. Pt affect is full range and mood is calm. Pt did not require restraint or seclusion to manage behavior.

## 2020-11-12 NOTE — PROGRESS NOTES
Brody  attended 3 of 3 OT groups today. Active and enthusiastic group participant this date. He participated this morning in a structured task project that required concentration, problem solving, and cooperating with peers (baking homemade cookies). Chatted socially with peers. Also participated in OT clinic this afternoon, where he initiated a chosen project (free-hand acrylic painting), followed through with plan, and asked for support with supplies as needed.      11/12/20 1300   Occupational Therapy   Type of Intervention structured groups   Response Initiates, socially acceptable   Hours 3

## 2020-11-12 NOTE — PROGRESS NOTES
"    ----------------------------------------------------------------------------------------------------------  [facility]  Psychiatric Progress Note  Hospital Day #7    Brody Colindres MRN# [id number]   Age: 60 year old YOB: 1960   Date of Admission: 11/4/2020     Subjective   The patient was discussed with the treatment team and chart notes were reviewed.      One-liner: Brody Colindres is a 60 year old male with a documented history of depression, bipolar disorder, schizophrenia, anxiety, severe polysubstance use disorder (mainly cocaine), PTSD, Insomnia, and T2DM with neuropathy who presented for  evaluation of SI in the context of stressors including recent move, unstable housing, unemployment, substance use (cocaine), and significant history of trauma. Legal status is Voluntary. Assessment is that the current presentation is consistent with evolving differential most likely major depressive disorder and symptoms of PTSD vs adjustment disorder.     Sleep:  7.25 hours (11/12/20 0600)  Prescribed Medications: Taken as prescribed   PRN Medications: acetaminophen, glucose **OR** dextrose **OR** glucagon, hydrOXYzine, magnesium hydroxide, nicotine, OLANZapine **OR** OLANZapine     Yesterday's changes:   - CD consults placed- awaiting updates    Overnight nursing notes:   - Noted as pleasant, watching TV, smiling. Denied SI.    Staff report: Awaiting discharge paperwork from 10/24 from [facility] to determine CD placement    Patient interview:  Brody Colindres reports feeling overall \"good, real good\". Reports that his sleep has markedly improved as he is able to go to sleep and stay asleep until he needs to get up. Trazodone has been very helpful. For his mood he reports an improvement as he no longer reports SI, understanding that effects of Lexapro increase will not be felt for at least a few weeks. No other questions or " "concerns.    --------------------------  Review of systems:    Patient has no bothersome physical symptoms; patient denies acute concerns.     Objective   /75   Pulse 76   Temp 97  F (36.1  C) (Temporal)   Resp 16   Ht 1.854 m (6' 1\")   Wt 106.6 kg (235 lb)   SpO2 100%   BMI 31.00 kg/m    Weight is 235 lbs 0 oz  Body mass index is 31 kg/m .    Mental Status Examination:    Oriented to:  Grossly Oriented  General:  Awake  Appearance:  appears stated age, Posture is hunched over in bed and Grooming is adequate  Behavior:  calm, cooperative and engaged  Eye Contact:  poor due to looking down at the floor and not computer screen for majority of interview  Psychomotor:  no abnormal motor symptoms appreciated no catatonia present  Speech:  soft volume/tone, spontaneous and with good articulation  Language: Fluent in English with appropriate syntax and vocabulary.  Mood:  \"Good, real good\"  Affect:  congruent with mood, stable and broad/full  Thought Process:  coherent, linear, logical and concrete  Thought Content:  No SI/HI/AH/VH; No apparent delusions  Associations:  intact  Insight:  fair due to recognition of illness though at times vague and unclear if he has experienced psychosis or not    Judgment:  partial due to status as voluntary and seeking help, though has previous pattern of admits then CD treatment then leaving or being kicked out   Attention Span:  grossly intact  Concentration:  grossly intact  Recent and Remote Memory:  not formally assessed  Fund of Knowledge:  average       Allergies     No Known Allergies     Labs & Imaging     Data   Recent Results (from the past 72 hour(s))   Glucose by meter    Collection Time: 11/09/20  8:05 AM   Result Value Ref Range    Glucose 98 70 - 99 mg/dL   Glucose by meter    Collection Time: 11/09/20  4:18 PM   Result Value Ref Range    Glucose 84 70 - 99 mg/dL   Glucose by meter    Collection Time: 11/10/20  8:16 AM   Result Value Ref Range    Glucose 85 70 " - 99 mg/dL   Glucose by meter    Collection Time: 11/11/20  5:04 PM   Result Value Ref Range    Glucose 178 (H) 70 - 99 mg/dL     Pending Results      Assessment     Primary psychiatric diagnoses:   # Depressive disorder, unspecified (manic depressive disorder vs PTSD vs adjustment disorder)    Secondary psychiatric diagnoses of concern this admission:   # PTSD  # Anxiety  # Cocaine use disorder, severe  # Polysubstance use disorder (amphetamine, cannabis, nicotine)     Diagnostic Impression:                Brody Colindres is a 60 year old male with a documented history of depression, bipolar disorder, schizophrenia, anxiety, polysubstance use disorder, severe (mainly cocaine), PTSD and T2DM with neuropathy who presents to the [place] [facility] on 11/5/2020 for evaluation of suicidal ideation in the context of stressors including recent move, unstable housing, unemployment, and substance use (cocaine). Substance use is likely a contributing factor to their presentation. Psychosocial factors contributing to current presentation include loss of loved ones (son murdered, another son is in death row), trauma while incarcerated, unemployment and unstable housing.                   The patient's definitive diagnosis still evolving and includes manic depressive disorder vs PTSD vs adjustment disorder. Patient denies current or past history of artem or psychosis, so current differential accounts for SI and depressive symptoms. Does not state a history of artem or psychosis outside of substance use.     PTA Medications:   Prior to Admission Medications   Prescriptions Last Dose Informant Patient Reported? Taking?   FLUoxetine (PROZAC) 20 MG capsule Past Week  No Yes   Sig: Take 1 capsule (20 mg) by mouth daily   alcohol swab prep pads   No No   Sig: Use to swab area of injection/nicci as directed.   blood glucose (NO BRAND SPECIFIED) test strip   No No   Sig: Use to test blood sugar 4 times daily or as  directed. To accompany: Blood Glucose Monitor Brands: per insurance.   blood glucose calibration (NO BRAND SPECIFIED) solution   No No   Sig: To accompany: Blood Glucose Monitor Brands: per insurance.   blood glucose monitoring (NO BRAND SPECIFIED) meter device kit   No No   Sig: Use to test blood sugar 4 times daily or as directed. Preferred blood glucose meter OR supplies to accompany: Blood Glucose Monitor Brands: per insurance.   gabapentin (NEURONTIN) 400 MG capsule Past Week Self No Yes   Sig: Take 1 capsule (400 mg) by mouth 3 times daily   insulin glargine (LANTUS PEN) 100 UNIT/ML pen Past Week  No Yes   Sig: Inject 15 Units Subcutaneous every 24 hours   Patient taking differently: Inject 15 Units Subcutaneous every 24 hours Takes @ bedtime   liraglutide (VICTOZA) 18 MG/3ML solution Past Week  No Yes   Sig: Inject 0.6 mg Subcutaneous daily (with dinner)   naltrexone (DEPADE/REVIA) 50 MG tablet Past Week  Yes Yes   Sig: Take 50 mg by mouth daily   thin (NO BRAND SPECIFIED) lancets   No No   Sig: Use with lanceting device. To accompany: Blood Glucose Monitor Brands: per insurance.      Facility-Administered Medications: None        Psychiatric course:   Brody Colindres was admitted to station 22 with attending Melissa Mckinley MD as a voluntary patient, and was treated in the therapeutic milieu with appropriate individual and group therapies. On admission, he was noted as tense, anxious, and guarded. PTA daily fluoxetine 20 mg PO and naltrexone 50 mg PO were continued for depression and substance use disorders.   On hospital day 1, Brody stated that he was happy to be in the hospital but endorsed SI, severe depressive symptoms, and appeared tense and anxious with a restricted/blunted affect. He felt his current medications were not working for him. Escitalopram 10 mg PO QD was started, and fluoxetine was D/Naveed d/t inefficacy and to minimize polypharmacy. Hydroxyzine 25 mg Q4H PRN was started for anxiety. Brody  also endorsed interest in entering chemical dependency treatment.   On 11/10/2020, pt reporting increased difficulty with sleep both falling asleep and staying asleep. Still reporting unchanged thoughts of suicide with no plan. Was increasingly conflicted about possible paranoia/hallucinations with respect to individuals he claimed were pursuing him. Escitalopram increased to 15 mg and started trazodone 100 mg at bedtime for sleep.    11/11/2020, pt reports slight improvement in sleep and mood. Denying suicidal ideation to staff and mental health team.                    Brody Colindres continued to meet criteria for inpatient hospitalization medication optimization, inpatient stabilization, and appropriate discharge planning.     Collateral:   None    Medical course:   Brody Colindres was medically cleared by the ED prior to admission to the unit.    Consults:   - None     Plan   Continue medication management while monitoring SI, depressive symptoms. Pursue chemical dependency treatment upon discharge.     Today's Changes:   - CD inpatient consult placed, awaiting paperwork from [facility]      Continue:    Psychiatric diagnoses and management:    # Depressive disorder, unspecified type  # Anxiety  # PTSD  - Escitalopram 15 mg PO daily  - Hydroxyzine 5 mg PO Q4H PRN    # Cocaine use disorder, severe  # Polysubstance use disorder (amphetamine, cannabis, nicotine)   - Naltrexone 50 mg PO daily  - CD consult    -- PRN meds; acetaminophen, glucose **OR** dextrose **OR** glucagon, hydrOXYzine, magnesium hydroxide, nicotine, OLANZapine **OR** OLANZapine      Pertinent Medical diagnoses and management:    # Insomnia  - Trazodone 100 mg PO at bedtime for sleep    # T2DM  - Insulin glargine (LANTUS PEN) 100 UNIT/ML, 15 Units Subcutaneous every 24 hours  - Liraglutide (VICTOZA) 18 MG/3ML solution, Inject 0.6 mg Subcutaneous daily (with dinner)  - Gabapentin (NEURONTIN) 400 MG for neuropathic pain   - Hypoglycemia  protocol   - Glucose monitoring     # Vitamin D deficiency  - D3 50 mcg daily       Discontinued Medications (& Rationale):  - Fluoxetine 20 mg daily was D/Naveed 11/6 d/t inefficacy, replaced by escitalopram      Legal Status: Voluntary      Disposition/Anticipated Discharge Date: Awaiting discharge paperwork from previous sober home to look for placement.      Safety Assessment:   Behavioral Orders   Procedures     Code 1 - Restrict to Unit     Routine Programming     As clinically indicated     Rule 25     Status 15     Every 15 minutes.     Suicide precautions     Patients on Suicide Precautions should have a Combination Diet ordered that includes a Diet selection(s) AND a Behavioral Tray selection for Safe Tray - with utensils, or Safe Tray - NO utensils          Patient was discussed with my attending physician, Dr. Melissa Mckinley MD who is in agreement with my assessment and plan.    Raheem Krause, MS3    Resident Attestation:  I was present with the medical student who participated in the service and in the documentation of the note. I have verified the history and personally performed the exam and medical decision making. I agree with the assessment and plan of care as documented in the note.    Berta Nam MD  PGY1 Psychiatry Resident      Attestation:  This patient has been seen and evaluated by me, Melissa Mckinley MD.  I have discussed this patient with the house staff team including the resident and medical student and I agree with the findings and plan in this note.    I have reviewed today's vital signs, medications, labs and imaging. Melissa Mckinley MD , PhD.

## 2020-11-12 NOTE — PLAN OF CARE
Problem: General Rehab Plan of Care  Goal: Therapeutic Recreation/Music Therapy Goal (Art Therapy)  Description: The patient and/or their representative will achieve their patient-specific goals related to the plan of care.  The patient-specific goals include: emotional expression, emotional regulation, trauma containment, creating a personal self narrative  Outcome: No Change   Art therapy directive was to create an inside/outside 2-D mask. Goals of directive: emotional expression, creating a personal self narrative, identifying personal strengths and aspects of self.  Pt was a quiet participant, focused on drawing for the full duration of group. Pt finished mask and briefly shared with group. Pt created various segments of color throughout the mask and shared that it was more of a spontaneous process, rather then focused on representing things about himself. Pts mood was calm, engaged and pleasant participant.

## 2020-11-13 LAB
GLUCOSE BLDC GLUCOMTR-MCNC: 192 MG/DL (ref 70–99)
GLUCOSE BLDC GLUCOMTR-MCNC: 93 MG/DL (ref 70–99)

## 2020-11-13 PROCEDURE — 250N000013 HC RX MED GY IP 250 OP 250 PS 637: Performed by: STUDENT IN AN ORGANIZED HEALTH CARE EDUCATION/TRAINING PROGRAM

## 2020-11-13 PROCEDURE — 99231 SBSQ HOSP IP/OBS SF/LOW 25: CPT | Mod: GC | Performed by: PSYCHIATRY & NEUROLOGY

## 2020-11-13 PROCEDURE — 999N001017 HC STATISTIC GLUCOSE BY METER IP

## 2020-11-13 PROCEDURE — 124N000002 HC R&B MH UMMC

## 2020-11-13 PROCEDURE — G0177 OPPS/PHP; TRAIN & EDUC SERV: HCPCS

## 2020-11-13 PROCEDURE — H2032 ACTIVITY THERAPY, PER 15 MIN: HCPCS

## 2020-11-13 RX ADMIN — INSULIN GLARGINE 15 UNITS: 100 INJECTION, SOLUTION SUBCUTANEOUS at 21:53

## 2020-11-13 RX ADMIN — TRAZODONE HYDROCHLORIDE 100 MG: 100 TABLET ORAL at 21:53

## 2020-11-13 RX ADMIN — NALTREXONE HYDROCHLORIDE 50 MG: 50 TABLET, FILM COATED ORAL at 08:33

## 2020-11-13 RX ADMIN — LIRAGLUTIDE 0.6 MG: 6 INJECTION SUBCUTANEOUS at 17:22

## 2020-11-13 RX ADMIN — Medication 50 MCG: at 08:34

## 2020-11-13 RX ADMIN — GABAPENTIN 400 MG: 400 CAPSULE ORAL at 21:53

## 2020-11-13 RX ADMIN — GABAPENTIN 400 MG: 400 CAPSULE ORAL at 08:33

## 2020-11-13 RX ADMIN — ESCITALOPRAM OXALATE 15 MG: 10 TABLET ORAL at 08:33

## 2020-11-13 RX ADMIN — GABAPENTIN 400 MG: 400 CAPSULE ORAL at 14:20

## 2020-11-13 ASSESSMENT — ACTIVITIES OF DAILY LIVING (ADL)
LAUNDRY: WITH SUPERVISION
HYGIENE/GROOMING: INDEPENDENT
ORAL_HYGIENE: INDEPENDENT
ORAL_HYGIENE: INDEPENDENT
DRESS: INDEPENDENT
HYGIENE/GROOMING: INDEPENDENT
DRESS: INDEPENDENT
LAUNDRY: WITH SUPERVISION

## 2020-11-13 NOTE — PROGRESS NOTES
"    ----------------------------------------------------------------------------------------------------------  [facility]  Psychiatric Progress Note  Hospital Day #8    Brody Colindres MRN# [id number]   Age: 60 year old YOB: 1960   Date of Admission: 11/4/2020     Subjective   The patient was discussed with the treatment team and chart notes were reviewed.      One-liner: Brody Colindres is a 60 year old male with a documented history of depression, bipolar disorder, schizophrenia, anxiety, severe polysubstance use disorder (mainly cocaine), PTSD, Insomnia, and T2DM with neuropathy who presented for  evaluation of SI in the context of stressors including recent move, unstable housing, unemployment, substance use (cocaine), and significant history of trauma. Legal status is Voluntary. Assessment is that the current presentation is consistent with evolving differential most likely major depressive disorder and symptoms of PTSD vs adjustment disorder.     Sleep:  7.25 hours (11/12/20 0600)  Prescribed Medications: Taken as prescribed   PRN Medications: acetaminophen, glucose **OR** dextrose **OR** glucagon, hydrOXYzine, magnesium hydroxide, nicotine, OLANZapine **OR** OLANZapine     Yesterday's changes:   - CD consults placed- awaiting updates    Overnight nursing notes:   - Noted as pleasant, watching TV, smiling. Denied SI.    Staff report: Awaiting discharge paperwork from 10/24 from [facility] to determine CD placement    Patient interview:  Brody Colindres reports feeling overall \"good/better.\" He denies any acute complaints or noticeable side effects of medications. He states being compliant with mask use on the unit. He continues to be interested in CD treatment. CTC is waiting for discharge summary from [facility] for further dispo planning. No med changes today.     --------------------------  Review of systems:    Patient has no bothersome physical symptoms; " "patient denies acute concerns.     Objective   BP (!) 146/80 (BP Location: Right arm)   Pulse 74   Temp 97.8  F (36.6  C) (Temporal)   Resp 12   Ht 1.854 m (6' 1\")   Wt 108.2 kg (238 lb 9.6 oz)   SpO2 99%   BMI 31.48 kg/m    Weight is 238 lbs 9.6 oz  Body mass index is 31.48 kg/m .    Mental Status Examination:    Oriented to:  Grossly Oriented  General:  Awake  Appearance:  appears stated age, Posture is hunched over in bed and Grooming is adequate  Behavior:  calm, cooperative and engaged  Eye Contact:  poor due to looking down at the floor and not computer screen for majority of interview  Psychomotor:  no abnormal motor symptoms appreciated no catatonia present  Speech:  soft volume/tone, spontaneous and with good articulation  Language: Fluent in English with appropriate syntax and vocabulary.  Mood:  \"Good, better\"  Affect:  congruent with mood, stable and broad/full  Thought Process:  coherent, linear, logical and concrete  Thought Content:  No SI/HI/AH/VH; No apparent delusions  Associations:  intact  Insight:  fair due to recognition of illness though at times vague and unclear if he has experienced psychosis or not    Judgment:  partial due to status as voluntary and seeking help, though has previous pattern of admits then CD treatment then leaving or being kicked out   Attention Span:  grossly intact  Concentration:  grossly intact  Recent and Remote Memory:  grossly intact  Fund of Knowledge:  average       Allergies     No Known Allergies     Labs & Imaging     Data   Recent Results (from the past 72 hour(s))   Glucose by meter    Collection Time: 11/10/20  8:16 AM   Result Value Ref Range    Glucose 85 70 - 99 mg/dL   Glucose by meter    Collection Time: 11/11/20  5:04 PM   Result Value Ref Range    Glucose 178 (H) 70 - 99 mg/dL   Glucose by meter    Collection Time: 11/12/20  8:38 AM   Result Value Ref Range    Glucose 92 70 - 99 mg/dL   Glucose by meter    Collection Time: 11/12/20  4:52 PM "   Result Value Ref Range    Glucose 108 (H) 70 - 99 mg/dL     Pending Results      Assessment     Primary psychiatric diagnoses:   # Depressive disorder, unspecified (manic depressive disorder vs PTSD vs adjustment disorder)    Secondary psychiatric diagnoses of concern this admission:   # PTSD  # Anxiety  # Cocaine use disorder, severe  # Polysubstance use disorder (amphetamine, cannabis, nicotine)     Diagnostic Impression:                Brody Colindres is a 60 year old male with a documented history of depression, bipolar disorder, schizophrenia, anxiety, polysubstance use disorder, severe (mainly cocaine), PTSD and T2DM with neuropathy who presents to the [place] [facility] on 11/5/2020 for evaluation of suicidal ideation in the context of stressors including recent move, unstable housing, unemployment, and substance use (cocaine). Substance use is likely a contributing factor to their presentation. Psychosocial factors contributing to current presentation include loss of loved ones (son murdered, another son is in death row), trauma while incarcerated, unemployment and unstable housing.                   The patient's definitive diagnosis still evolving and includes manic depressive disorder vs PTSD vs adjustment disorder. Patient denies current or past history of artem or psychosis, so current differential accounts for SI and depressive symptoms. Does not state a history of artem or psychosis outside of substance use.     PTA Medications:   Prior to Admission Medications   Prescriptions Last Dose Informant Patient Reported? Taking?   FLUoxetine (PROZAC) 20 MG capsule Past Week  No Yes   Sig: Take 1 capsule (20 mg) by mouth daily   alcohol swab prep pads   No No   Sig: Use to swab area of injection/nicci as directed.   blood glucose (NO BRAND SPECIFIED) test strip   No No   Sig: Use to test blood sugar 4 times daily or as directed. To accompany: Blood Glucose Monitor Brands: per insurance.    blood glucose calibration (NO BRAND SPECIFIED) solution   No No   Sig: To accompany: Blood Glucose Monitor Brands: per insurance.   blood glucose monitoring (NO BRAND SPECIFIED) meter device kit   No No   Sig: Use to test blood sugar 4 times daily or as directed. Preferred blood glucose meter OR supplies to accompany: Blood Glucose Monitor Brands: per insurance.   gabapentin (NEURONTIN) 400 MG capsule Past Week Self No Yes   Sig: Take 1 capsule (400 mg) by mouth 3 times daily   insulin glargine (LANTUS PEN) 100 UNIT/ML pen Past Week  No Yes   Sig: Inject 15 Units Subcutaneous every 24 hours   Patient taking differently: Inject 15 Units Subcutaneous every 24 hours Takes @ bedtime   liraglutide (VICTOZA) 18 MG/3ML solution Past Week  No Yes   Sig: Inject 0.6 mg Subcutaneous daily (with dinner)   naltrexone (DEPADE/REVIA) 50 MG tablet Past Week  Yes Yes   Sig: Take 50 mg by mouth daily   thin (NO BRAND SPECIFIED) lancets   No No   Sig: Use with lanceting device. To accompany: Blood Glucose Monitor Brands: per insurance.      Facility-Administered Medications: None        Psychiatric course:   Brody Colindres was admitted to station 22 with attending Melissa Mckinley MD as a voluntary patient, and was treated in the therapeutic milieu with appropriate individual and group therapies. On admission, he was noted as tense, anxious, and guarded. PTA daily fluoxetine 20 mg PO and naltrexone 50 mg PO were continued for depression and substance use disorders.   On hospital day 1, Brody stated that he was happy to be in the hospital but endorsed SI, severe depressive symptoms, and appeared tense and anxious with a restricted/blunted affect. He felt his current medications were not working for him. Escitalopram 10 mg PO QD was started, and fluoxetine was D/Naveed d/t inefficacy and to minimize polypharmacy. Hydroxyzine 25 mg Q4H PRN was started for anxiety. Brody also endorsed interest in entering chemical dependency  treatment.   On 11/10/2020, pt reporting increased difficulty with sleep both falling asleep and staying asleep. Still reporting unchanged thoughts of suicide with no plan. Was increasingly conflicted about possible paranoia/hallucinations with respect to individuals he claimed were pursuing him. Escitalopram increased to 15 mg and started trazodone 100 mg at bedtime for sleep.    11/11/2020, pt reports slight improvement in sleep and mood. Denying suicidal ideation to staff and mental health team.                    Brody Colindres continued to meet criteria for inpatient hospitalization medication optimization, inpatient stabilization, and appropriate discharge planning.     Collateral:   None    Medical course:   Brody Colindres was medically cleared by the ED prior to admission to the unit.    Consults:   - None     Plan   Continue medication management while monitoring SI, depressive symptoms.     Today's Changes:   - None      Continue:    Psychiatric diagnoses and management:    # Depressive disorder, unspecified type  # Anxiety  # PTSD  - Escitalopram 15 mg PO daily  - Hydroxyzine 5 mg PO Q4H PRN    # Cocaine use disorder, severe  # Polysubstance use disorder (amphetamine, cannabis, nicotine)   - Naltrexone 50 mg PO daily  - CD consult    -- PRN meds; acetaminophen, glucose **OR** dextrose **OR** glucagon, hydrOXYzine, magnesium hydroxide, nicotine, OLANZapine **OR** OLANZapine      Pertinent Medical diagnoses and management:    # Insomnia  - Trazodone 100 mg PO at bedtime for sleep    # T2DM  - Insulin glargine (LANTUS PEN) 100 UNIT/ML, 15 Units Subcutaneous every 24 hours  - Liraglutide (VICTOZA) 18 MG/3ML solution, Inject 0.6 mg Subcutaneous daily (with dinner)  - Gabapentin (NEURONTIN) 400 MG for neuropathic pain   - Hypoglycemia protocol   - Glucose monitoring     # Vitamin D deficiency  - D3 50 mcg daily       Discontinued Medications (& Rationale):  - Fluoxetine 20 mg daily was D/Naveed 11/6 d/t  inefficacy, replaced by escitalopram      Legal Status: Voluntary      Disposition/Anticipated Discharge Date: Awaiting discharge paperwork from previous sober home to look for placement.      Safety Assessment:   Behavioral Orders   Procedures     Code 1 - Restrict to Unit     Routine Programming     As clinically indicated     Rule 25     Status 15     Every 15 minutes.     Suicide precautions     Patients on Suicide Precautions should have a Combination Diet ordered that includes a Diet selection(s) AND a Behavioral Tray selection for Safe Tray - with utensils, or Safe Tray - NO utensils          Patient was discussed with my attending physician, Dr. Melissa Mckinley MD who is in agreement with my assessment and plan.    Raheem Krause, MS3    Resident Attestation:  I was present with the medical student who participated in the service and in the documentation of the note. I have verified the history and personally performed the exam and medical decision making. I agree with the assessment and plan of care as documented in the note.    Berta Nam MD  PGY1 Psychiatry Resident    Attestation:  This patient has been seen and evaluated by me, Melissa Mckinley MD.  I have discussed this patient with the house staff team including the resident and medical student and I agree with the findings and plan in this note.    I have reviewed today's vital signs, medications, labs and imaging. Melissa Mckinley MD , PhD.

## 2020-11-13 NOTE — PLAN OF CARE
Problem: Suicidal Behavior  Goal: Suicidal Behavior is Absent or Managed  Outcome: Improving  Note: Patient denied suicidal thoughts or wishing to be dead. Rated depression at 4/10 (10 being the worst), no anxiety. Denied HI, hallucinations, or delusions/paranoia. Patient said he feels he has gotten better and now is waiting for CD Tx placement.   Thoughts are linear and logical. No loose associations. Speech is clear and organized. Mood was flat and blunted. Affect is calm. Memory appeared to be intact. Patient was unable to decide what he wants to do today. Took medications as prescribed. No SE reported or assessed. Behavior was controlled. Patient spent his day watching TV and resting in bed. Minimal social interactions. Denied pain and all other physical issues.      Problem: Adult Inpatient Plan of Care  Goal: Plan of Care Review  Recent Flowsheet Documentation  Taken 11/13/2020 1300 by Ofelia Hamilton RN  Plan of Care Reviewed With: patient     Problem: Adult Inpatient Plan of Care  Goal: Absence of Hospital-Acquired Illness or Injury  Intervention: Prevent Skin Injury  Recent Flowsheet Documentation  Taken 11/13/2020 1300 by Ofelia Hamilton RN  Body Position: position changed independently     Problem: Adult Inpatient Plan of Care  Goal: Optimal Comfort and Wellbeing  Intervention: Provide Person-Centered Care  Recent Flowsheet Documentation  Taken 11/13/2020 1300 by Ofelia Hamilton RN  Trust Relationship/Rapport:   care explained   choices provided   emotional support provided   empathic listening provided   questions answered   questions encouraged   reassurance provided   thoughts/feelings acknowledged     Problem: Behavioral Health Plan of Care  Goal: Plan of Care Review  Recent Flowsheet Documentation  Taken 11/13/2020 1300 by Ofelia Hamilton RN  Plan of Care Reviewed With: patient  Patient Agreement with Plan of Care: (waiting for CD Tx placement) agrees     Problem: Behavioral Health Plan of  Care  Goal: Optimized Coping Skills in Response to Life Stressors  Intervention: Promote Effective Coping Strategies  Recent Flowsheet Documentation  Taken 11/13/2020 1300 by Ofelia Hamilton, RN  Supportive Measures:   active listening utilized   verbalization of feelings encouraged   positive reinforcement provided

## 2020-11-13 NOTE — PROGRESS NOTES
"Brody  attended 1 of 2 OT groups today. Finished his free-hand painting during OT clinic this afternoon, titling it \"Madness.\" Chatted sociably and sorted through the unit record collection. Reported eagerness to begin treatment.      11/13/20 1600   Occupational Therapy   Type of Intervention structured groups   Response Initiates, socially acceptable   Hours 1       "

## 2020-11-13 NOTE — PLAN OF CARE
Re: Daily Note      Work Completed:  -Chart review  -Pre round meeting with team  -Rounded with team, addressed patient needs/concerns  -Post round meeting with team    Discharge plan or goal:    -Pending stabilization & development of a safe discharge plan    Barriers to discharge:   -Patient requires further psychiatric stabilization      Tiffanie Acharya, LPCC, [facility]  Clinical Treatment Coordinator

## 2020-11-13 NOTE — PLAN OF CARE
Problem: Suicidal Behavior  Goal: Suicidal Behavior is Absent or Managed  11/13/2020 1733 by Ofelia Hamilton, RN  Outcome: Improving  Note: Patient continued to deny having suicidal thoughts r wishing to be dead. Still rates depression at 4/10 and no anxiety. Denied hallucinations or paranoia.   Thoughts are organized and future oriented. Speech is soft, clear and coherent. Communicates needs well. Mood is flat and blunted, affect is calm. Memory appeared intact. Denied pain ad all other physical issues. Pt keeps self clean and well groomed. Denied issues with sleep at night.   Behavior is appropriate. Patient participates in all offered groups today and this evening.   11/13/2020 1400 by Ofelia Hamilton, RN  Outcome: Improving  Note: Patient denied suicidal thoughts or wishing to be dead. Rated depression at 4/10 (10 being the worst), no anxiety. Denied HI, hallucinations, or delusions/paranoia. Patient said he feels he has gotten better and now is waiting for CD Tx placement.   Thoughts are linear and logical. No loose associations. Speech is clear and organized. Mood was flat and blunted. Affect is calm. Memory appeared to be intact. Patient was unable to decide what he wants to do today. Took medications as prescribed. No SE reported or assessed. Behavior was controlled. Patient spent his day watching TV and resting in bed. Minimal social interactions. Denied pain and all other physical issues.      Problem: Adult Inpatient Plan of Care  Goal: Plan of Care Review  Recent Flowsheet Documentation  Taken 11/13/2020 1729 by Ofelia Hamilton, RN  Goal: Absence of Hospital-Acquired Illness or Injury  Intervention: Prevent Skin Injury  Recent Flowsheet Documentation  Taken 11/13/2020 1729 by Ofelia Hamilton RN  Body Position: position changed independently  Goal: Optimal Comfort and Wellbeing  Intervention: Provide Person-Centered Care  Recent Flowsheet Documentation  Taken 11/13/2020 1729 by Ofelia Hamilton,  RN  Trust Relationship/Rapport:   care explained   choices provided   emotional support provided   empathic listening provided   questions answered   questions encouraged   reassurance provided   thoughts/feelings acknowledged     Problem: Behavioral Health Plan of Care  Goal: Plan of Care Review  Recent Flowsheet Documentation  Taken 11/13/2020 1729 by Ofleia Hamilton RN  Plan of Care Reviewed With: patient  Patient Agreement with Plan of Care: agrees  Goal: Optimized Coping Skills in Response to Life Stressors  Intervention: Promote Effective Coping Strategies  Recent Flowsheet Documentation  Taken 11/13/2020 1729 by Ofelia Hamilton RN  Supportive Measures:   active listening utilized   verbalization of feelings encouraged   positive reinforcement provided  Goal: Develops/Participates in Therapeutic [place] to Support Successful Transition  Intervention: Foster Therapeutic [place]  Recent Flowsheet Documentation  Taken 11/13/2020 1729 by Ofelia Hamilton RN  Trust Relationship/Rapport:   care explained   choices provided   emotional support provided   empathic listening provided   questions answered   questions encouraged   reassurance provided   thoughts/feelings acknowledged

## 2020-11-13 NOTE — PLAN OF CARE
Pt enjoying watching tv in lounge most of shift . Smiling . Pleasant. He denies si . States he is planning on going to some form of cd treatment on discharge and the team is looking into this . Blood sugar stable at 108

## 2020-11-14 PROCEDURE — 250N000013 HC RX MED GY IP 250 OP 250 PS 637: Performed by: STUDENT IN AN ORGANIZED HEALTH CARE EDUCATION/TRAINING PROGRAM

## 2020-11-14 PROCEDURE — 124N000002 HC R&B MH UMMC

## 2020-11-14 RX ADMIN — GABAPENTIN 400 MG: 400 CAPSULE ORAL at 14:54

## 2020-11-14 RX ADMIN — GABAPENTIN 400 MG: 400 CAPSULE ORAL at 21:40

## 2020-11-14 RX ADMIN — NALTREXONE HYDROCHLORIDE 50 MG: 50 TABLET, FILM COATED ORAL at 09:22

## 2020-11-14 RX ADMIN — LIRAGLUTIDE 0.6 MG: 6 INJECTION SUBCUTANEOUS at 18:12

## 2020-11-14 RX ADMIN — GABAPENTIN 400 MG: 400 CAPSULE ORAL at 09:22

## 2020-11-14 RX ADMIN — TRAZODONE HYDROCHLORIDE 100 MG: 100 TABLET ORAL at 21:40

## 2020-11-14 RX ADMIN — INSULIN GLARGINE 15 UNITS: 100 INJECTION, SOLUTION SUBCUTANEOUS at 21:41

## 2020-11-14 RX ADMIN — ESCITALOPRAM OXALATE 15 MG: 10 TABLET ORAL at 09:21

## 2020-11-14 RX ADMIN — Medication 50 MCG: at 09:21

## 2020-11-14 ASSESSMENT — ACTIVITIES OF DAILY LIVING (ADL)
HYGIENE/GROOMING: INDEPENDENT
ORAL_HYGIENE: INDEPENDENT
DRESS: INDEPENDENT
LAUNDRY: WITH SUPERVISION
DRESS: INDEPENDENT
LAUNDRY: WITH SUPERVISION
HYGIENE/GROOMING: INDEPENDENT
ORAL_HYGIENE: INDEPENDENT

## 2020-11-14 NOTE — PROGRESS NOTES
Pt. Was visible throughout the shift, in the milieu for the large majority of it. He had a good mood and presented with a full range affect for the entirety of the shift. He denies any SISIB and isn't responding to or reporting any internal stimulus.

## 2020-11-14 NOTE — PLAN OF CARE
Problem: General Rehab Plan of Care  Goal: Therapeutic Recreation/Music Therapy Goal (Art Therapy)  Description: The patient and/or their representative will achieve their patient-specific goals related to the plan of care.  The patient-specific goals include: emotional expression, emotional regulation, trauma containment  Outcome: Improving   Art Therapy directive is to create two drawings expressing a calm mind vs an anxious mind using art medium of pts choice. Goals of directive: emotional expression, emotional regulation, mindfulness. Pt was a quiet participant, chose to continue working on an abstract painting pt had started in a previous OT group. Pt shared that he enjoys painting and experimented with tempera paint sticks while in group as well. Pts mood was calm, pleasant and cooperative.

## 2020-11-14 NOTE — PROGRESS NOTES
"Pt appeared to sleep 6 hours this night,he stated that he felt \"ok\" upon awakening,mood calm,,suicide precautions remain in place  "

## 2020-11-15 LAB — GLUCOSE BLDC GLUCOMTR-MCNC: 81 MG/DL (ref 70–99)

## 2020-11-15 PROCEDURE — 999N001017 HC STATISTIC GLUCOSE BY METER IP

## 2020-11-15 PROCEDURE — 250N000013 HC RX MED GY IP 250 OP 250 PS 637: Performed by: STUDENT IN AN ORGANIZED HEALTH CARE EDUCATION/TRAINING PROGRAM

## 2020-11-15 PROCEDURE — 90853 GROUP PSYCHOTHERAPY: CPT

## 2020-11-15 PROCEDURE — 124N000002 HC R&B MH UMMC

## 2020-11-15 RX ADMIN — Medication 50 MCG: at 10:01

## 2020-11-15 RX ADMIN — LIRAGLUTIDE 0.6 MG: 6 INJECTION SUBCUTANEOUS at 18:37

## 2020-11-15 RX ADMIN — TRAZODONE HYDROCHLORIDE 100 MG: 100 TABLET ORAL at 22:21

## 2020-11-15 RX ADMIN — GABAPENTIN 400 MG: 400 CAPSULE ORAL at 15:17

## 2020-11-15 RX ADMIN — ESCITALOPRAM OXALATE 15 MG: 10 TABLET ORAL at 10:00

## 2020-11-15 RX ADMIN — NALTREXONE HYDROCHLORIDE 50 MG: 50 TABLET, FILM COATED ORAL at 10:00

## 2020-11-15 RX ADMIN — GABAPENTIN 400 MG: 400 CAPSULE ORAL at 22:21

## 2020-11-15 RX ADMIN — INSULIN GLARGINE 15 UNITS: 100 INJECTION, SOLUTION SUBCUTANEOUS at 22:19

## 2020-11-15 RX ADMIN — GABAPENTIN 400 MG: 400 CAPSULE ORAL at 10:01

## 2020-11-15 ASSESSMENT — ACTIVITIES OF DAILY LIVING (ADL)
ORAL_HYGIENE: INDEPENDENT
LAUNDRY: WITH SUPERVISION
ORAL_HYGIENE: INDEPENDENT
HYGIENE/GROOMING: INDEPENDENT
DRESS: INDEPENDENT
HYGIENE/GROOMING: INDEPENDENT
ORAL_HYGIENE: INDEPENDENT
LAUNDRY: WITH SUPERVISION
DRESS: INDEPENDENT
HYGIENE/GROOMING: INDEPENDENT
DRESS: INDEPENDENT
LAUNDRY: WITH SUPERVISION

## 2020-11-15 ASSESSMENT — MIFFLIN-ST. JEOR: SCORE: 1973.85

## 2020-11-15 NOTE — PLAN OF CARE
Pt spent almost the entire shift in the unge watching TV and socializing.  Pt is friendly and social with staff and other pts.  Pt chose several movies to watch today.  Pt frequently asks for snacks throughout the shift. Patient missed BG at meals as he was already eating. Thoughts are clear and organized. Denies SI and SIB. Crystal Phillips RN

## 2020-11-15 NOTE — PLAN OF CARE
Patient sitting in lounge, in front of tv al shift. Became irritable when staff had to turn the channel because of the violent nature of the show. Responses to staff are brief. Offers no complaints. Thoughts are clear and organized. He is med compliant. Denies SI and SIB. Crystal Phillips RN

## 2020-11-15 NOTE — PROGRESS NOTES
Pt was visible in the milieu, watched football game. Pt was social with peers & attended community meeting. Pt rates depression and anxiety 3/10. Pt denies SI & SIB.

## 2020-11-15 NOTE — PROGRESS NOTES
Pt slept 6.5 hours  This night up early smiling and appropriately interacitve,he remains on suicide precautions,defer to team

## 2020-11-15 NOTE — PLAN OF CARE
Brody was visible throughout the shift, in the milieu for the large majority of it. He had a good mood and presented with a full range affect for the entirety of the shift. Watching tv and socializing with peers. No psychotic symptoms noted.He is med compliant. Denies SI or SIB. Crystal Phillips RN

## 2020-11-16 LAB
ALBUMIN UR-MCNC: NEGATIVE MG/DL
APPEARANCE UR: CLEAR
BILIRUB UR QL STRIP: NEGATIVE
COLOR UR AUTO: YELLOW
GLUCOSE BLDC GLUCOMTR-MCNC: 206 MG/DL (ref 70–99)
GLUCOSE BLDC GLUCOMTR-MCNC: 216 MG/DL (ref 70–99)
GLUCOSE UR STRIP-MCNC: NEGATIVE MG/DL
HGB UR QL STRIP: NEGATIVE
KETONES UR STRIP-MCNC: NEGATIVE MG/DL
LEUKOCYTE ESTERASE UR QL STRIP: ABNORMAL
MUCOUS THREADS #/AREA URNS LPF: PRESENT /LPF
NITRATE UR QL: NEGATIVE
OSMOLALITY UR: 721 MMOL/KG (ref 100–1200)
PH UR STRIP: 5.5 PH (ref 5–7)
RBC #/AREA URNS AUTO: 1 /HPF (ref 0–2)
SOURCE: ABNORMAL
SP GR UR STRIP: 1.02 (ref 1–1.03)
SQUAMOUS #/AREA URNS AUTO: <1 /HPF (ref 0–1)
UROBILINOGEN UR STRIP-MCNC: NORMAL MG/DL (ref 0–2)
WBC #/AREA URNS AUTO: 26 /HPF (ref 0–5)

## 2020-11-16 PROCEDURE — 81001 URINALYSIS AUTO W/SCOPE: CPT | Performed by: STUDENT IN AN ORGANIZED HEALTH CARE EDUCATION/TRAINING PROGRAM

## 2020-11-16 PROCEDURE — G0177 OPPS/PHP; TRAIN & EDUC SERV: HCPCS

## 2020-11-16 PROCEDURE — 999N001017 HC STATISTIC GLUCOSE BY METER IP

## 2020-11-16 PROCEDURE — 124N000002 HC R&B MH UMMC

## 2020-11-16 PROCEDURE — H2032 ACTIVITY THERAPY, PER 15 MIN: HCPCS

## 2020-11-16 PROCEDURE — 250N000013 HC RX MED GY IP 250 OP 250 PS 637: Performed by: STUDENT IN AN ORGANIZED HEALTH CARE EDUCATION/TRAINING PROGRAM

## 2020-11-16 PROCEDURE — 83935 ASSAY OF URINE OSMOLALITY: CPT | Performed by: STUDENT IN AN ORGANIZED HEALTH CARE EDUCATION/TRAINING PROGRAM

## 2020-11-16 RX ADMIN — GABAPENTIN 400 MG: 400 CAPSULE ORAL at 14:26

## 2020-11-16 RX ADMIN — GABAPENTIN 400 MG: 400 CAPSULE ORAL at 20:55

## 2020-11-16 RX ADMIN — Medication 50 MCG: at 08:43

## 2020-11-16 RX ADMIN — ESCITALOPRAM OXALATE 15 MG: 10 TABLET ORAL at 08:43

## 2020-11-16 RX ADMIN — GABAPENTIN 400 MG: 400 CAPSULE ORAL at 08:43

## 2020-11-16 RX ADMIN — NALTREXONE HYDROCHLORIDE 50 MG: 50 TABLET, FILM COATED ORAL at 08:43

## 2020-11-16 RX ADMIN — INSULIN GLARGINE 15 UNITS: 100 INJECTION, SOLUTION SUBCUTANEOUS at 20:55

## 2020-11-16 RX ADMIN — LIRAGLUTIDE 0.6 MG: 6 INJECTION SUBCUTANEOUS at 17:18

## 2020-11-16 RX ADMIN — TRAZODONE HYDROCHLORIDE 100 MG: 100 TABLET ORAL at 20:55

## 2020-11-16 ASSESSMENT — ACTIVITIES OF DAILY LIVING (ADL)
HYGIENE/GROOMING: INDEPENDENT
ORAL_HYGIENE: INDEPENDENT
DRESS: INDEPENDENT
LAUNDRY: WITH SUPERVISION

## 2020-11-16 NOTE — PLAN OF CARE
BEHAVIORAL TEAM DISCUSSION    Participants:   Melissa Mckinley MD Attending Psychiatrist  Berta Nam MD, PGY1  Christiana Reynolds, MSW, [facility] Clinical Treatment Coordinator  Aguila Davis, TIN [place], MS3  Raheem Krause, MS3  Brain Greene, MS3  Gary Blood, PharmD PD4   Progress: improving  Anticipated length of stay: assessment ongoing  Continued Stay Criteria/Rationale: medication management ongoing discharge disposition planning ongoing.   Medical/Physical: Per psychiatry physician progress note:   Pertinent Medical diagnoses and management:     #Nocturia  - Labs today- UA with culture, CMP, Urine Osm     # Insomnia  - Trazodone 100 mg PO at bedtime for sleep     # T2DM  - Insulin glargine (LANTUS PEN) 100 UNIT/ML, 15 Units Subcutaneous every 24 hours  - Liraglutide (VICTOZA) 18 MG/3ML solution, Inject 0.6 mg Subcutaneous daily (with dinner)  - Gabapentin (NEURONTIN) 400 MG for neuropathic pain   - Hypoglycemia protocol   - Glucose monitoring      # Vitamin D deficiency  - D3 50 mcg daily   Precautions:   Behavioral Orders   Procedures    Code 1 - Restrict to Unit    Routine Programming     As clinically indicated    Rule 25    Status 15     Every 15 minutes.    Suicide precautions     Patients on Suicide Precautions should have a Combination Diet ordered that includes a Diet selection(s) AND a Behavioral Tray selection for Safe Tray - with utensils, or Safe Tray - NO utensils       Plan: Continue with medication management per psychiatry assessment ongoing for chemical dependency treatment level of care recommendations  Rationale for change in precautions or plan: Per ongoing assessment

## 2020-11-16 NOTE — PROGRESS NOTES
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          Psychiatric Mental Status Examination:   Appearance: awake, alert  Attitude: mostly cooperative, despondent, minimal engagement in interview  Eye Contact: poor  Mood:  depressed  Affect: mood congruent and blunted, depressed  Speech:  clear, coherent and normal prosody  Language: fluent in English  Psychomotor Behavior:  no evidence of tardive dyskinesia, dystonia, or tics  Gait/Station: normal  Thought Process:  linear, logical, goal oriented  Associations:  no loose associations  Thought Content:  Reporting active SI though no intent in hospital setting. Denying HI/AVH; no evidence of psychotic thinking  Insight:  fair  Judgement: fair  Oriented to:  time, person, and place  Attention Span and Concentration:  fair  Recent " and Remote Memory:  intact  Fund of Knowledge: appropriate     Clinical Global Impressions  First:7     Most recent:7          Physical Exam:   Please refer to physical exam completed by ED provider, Tanner Alvares DO, on 20. I agree with the findings and assessment and have no additional findings to add at this time.          Current Facility-Administered Medications   Medication     acetaminophen (TYLENOL) tablet 650 mg     alum & mag hydroxide-simethicone (MAALOX  ES) suspension 30 mL     bisacodyl (DULCOLAX) Suppository 10 mg     buPROPion (WELLBUTRIN SR) 12 hr tablet 100 mg     glucose gel 15-30 g     Or     dextrose 50 % injection 25-50 mL     Or     glucagon injection 1 mg     gabapentin (NEURONTIN) capsule 400 mg     hydrOXYzine (ATARAX) tablet 25 mg     insulin aspart (NovoLOG) injection (RAPID ACTING)     insulin aspart (NovoLOG) injection (RAPID ACTING)     insulin aspart (NovoLOG) injection (RAPID ACTING)     insulin aspart (NovoLOG) injection (RAPID ACTING)     insulin glargine (LANTUS PEN) injection 20 Units     magnesium hydroxide (MILK OF MAGNESIA) suspension 30 mL     nicotine (NICORETTE) lozenge 4-8 mg     OLANZapine (zyPREXA) tablet 10 mg     Or     OLANZapine (zyPREXA) injection 10 mg     QUEtiapine (SEROquel) tablet 50 mg     traZODone (DESYREL) tablet 50 mg       Dimension III: Emotional/Behavioral/Cognitive      Previous ratin Current ratin   Comments:    Patient denies any changes since 20 R25      Dimension IV: Readiness for Change      Previous ratin Current ratin   Comments:    Patient did not follow through with recommendations from most 20 Rule 25. He currently reports that he wants to go treatment and wants to stop using all substance. He believes he is in a different place now and is more motivated.      Dimension V: Relapse/Continued Use/Continued problem potential      Previous ratin Current ratin   Comments:    No changes  since 20 R25.  Patient start using after completing R25 on  and being discharged from the hospital.        Dimension VI: Recovery environment    Previous ratin Current ratin   Comments:    No change since 20 Rule 25         Summary of Assessment Update and Recommendations:   What was the outcome of last referral?  [facility]. He did not follow through.      Reason for changes in the Risk Description since last assessment? Patient continued substance use, is hospitalized again.      Recommendation and rationale for current request and significant issues that need to be addressed:     1. Abstain from all non-prescribed mood-altering substances  2. Take all medications as prescribed  3. Enter and complete a [facility] Residential treatment program  4. Follow all recommendations upon discharge from treatment. Recommendations may include, but are not limited to: extended treatment, outpatient treatment and/or sober housing.  5. Follow all recommendations of your medical providers          Formatting of this note might be different from the original.  Rule 25 Assessment  Background Information   1. Date of Assessment Request       20  2. Date of Assessment                      20  3. Date Service Authorized  4.                                         LUIS Monroy, [facility]  5.  Phone Number              [phone]  6. Referent                                         SONIA Cabrera  7. Assessment Site                            [address] MENTAL [facility]  8. Client Name Brody Colindres  9. Date of Birth                                  1960  Age 60 y.o.  10. Gender                                         male   11. PMI/ Insurance No.  12. Client's Primary Language:         English  13. Do you require special accommodations, such as an  or assistance with written material? No   14. Current  Address:                         [place] [address]   15. Client Phone Numbers:               Pt denied having phone   16. Alternate (cell) Phone Number:     17. Tell me what has happened to bring you here today.              Pt reported He began to hear voices telling him to jump in front of the light rail (train. Pt reported he brought himself to the hospital.     18. Have you had other rule 25 assessments?             yes, when, where, and what circumstances: Pt reportted he had a rule 25 about a week ago on the psychiatric unit at [facility].     DIMENSION I - Acute Intoxication /Withdrawal Potential   1. Chemical use most recent 12 months outside a facility and other significant use history (client self-report)    X = Primary Drug Used   Age of First Use Most Recent Pattern of Use and Duration   Need enough information to show pattern (both frequency and amounts) and to show tolerance for each chemical that has a diagnosis   Date of last use and time, if needed   Withdrawal Potential? Requiring special care Method of use  (oral, smoked, snort, IV, etc)   [] Alcohol Denied   [] Marijuana/Hashish   [x] Cocaine/Crack 56 Pt reported he uses both powder and/or crack cocaine. Pt reported he snorts, or smokes cocaine 1-2 x per week. Last week (Pt could not remember last day. no Smoke/snort   [] Meth/Amphetamines 60 Pt reported trying methamphetamine 2 X by smoking it. 7/26/20 no Smoke/snort   [] Heroin Denied   [] Other Opiates/Synthetics Denied   [] Inhalants Denied   [] Benzodiazepines Denied   [] Hallucinogens Denied   [] Barbiturates/Sedatives/Hypnotics Denied   [] Over-the-Counter Drugs Denied   [] Other Denied   [] Nicotine 56 Pt reported smoking 10 cigarettes per day. 7/27/20 yes smoke     2. Do you use greater amounts of alcohol/other drugs to feel intoxicated or achieve the desired effect? no. Or use the same amount and get less of an effect? No (DSM) Example: Pt denied developing a tolerance. Pt reported he  uses to treat the pain of past trauma.    3A. Have you ever been to detox? no  3B. When was the first time? na   3C. How many times since then? n/a  3D. Date of most recent detox: n/a    4. Withdrawal symptoms: Have you had any of the following withdrawal symptoms? yes  Past 12 months Recent (past 30 days)   Sweating (rapid pulse), Irritability, Anxiety/worried, Unable to sleep, Fatigue/extremely tired, Pyschosis Agitation, Sad/depressed feeling, Irritability, Anxiety/worried, Unable to sleep, Fatigue/extremely tired, Pyschosis     Notes: Pt appeared agitated during this interview.  's Visual Observations and Symptoms: Pt appeared agitated and confused  Based on the above information, is withdrawal likely to require attention as part of treatment participation? no    Dimension I Ratings   Acute intoxication/Withdrawal potential - The placing authority must use the criteria in Dimension I to determine a client s acute intoxication and withdrawal potential.   RISK DESCRIPTIONS - Severity ratin Client can tolerate and cope with withdrawal discomfort. The client displays mild to moderate intoxication or signs and symptoms interfering with daily functioning but does not immediately endanger self or others. Client poses minimal risk or severe withdrawal.     REASONS SEVERITY WAS ASSIGNED (What about the amount of the person s use and date of most recent use and history of withdrawal problems suggests the potential of withdrawal symptoms requiring professional assistance? )  Pt denied any withdrawal symptoms during this interview. Pt did appear to be agitated in response to some of the questions on the chemical health assessment (Rule 25).     DIMENSION II - Biomedical Complications and Conditions   1a. Do you have any current health/medical conditions?(Include any infectious diseases, allergies, or chronic or acute pain, history of chronic conditions)  Essential Hypertension, Type 2 Diabetes w/o  "insulin.    1b. On a scale of mild, moderate to severe please specify the severity of the patient's diabetes and/or neuropathy.  Pt reported he is currently in no pain, but rates his neuropathy pain at as \"Servere\" when it is active.    2. Do you have a health care provider? When was your most recent appointment? What concerns were identified?  Pt denied having any access to healthcare providers. Pt reported he just relocated to the [facility].     3. If indicated by answers to items 1 or 2: How do you deal with these concerns? Is that working for you? If you are not receiving care for this problem, why not?  Pt reported he goes into the ED, or hospital for care.     4A. List current medication(s) including over-the-counter or herbal supplements--including pain management: PRN Meds: Maalox, Tylenol, Dextrose (Injections), Glucagon (injections), Haloperidol (injections), Lorazepam, Benadryl, Haldol.  Wellbutrin XL, Gabapentin, Metformin.   4B. Do you follow current medical recommendations/take medications as prescribed? yes  4C. When did you last take your medication? 20  4D. Do you need a referral to have a follow up with a primary care physician? Pt reported he would like referral to clinic and healthcare provider.     5. Has a health care provider/healer ever recommended that you reduce or quit alcohol/drug use? No (DSM)    6. Are you pregnant? no  6B. Receiving prenatal care? no  6C. When is your baby due? no, Pt is a male.    7. Have you had any injuries, assaults/violence towards you, accidents, health related issues, overdose(s) or hospitalizations related to your use of alcohol or other drugs: no    8. Do you have any specific physical needs/accommodations? no    Dimension II Ratings   Biomedical Conditions and Complications - The placing authority must use the criteria in Dimension II to determine a client s biomedical conditions and complications.   RISK DESCRIPTIONS - Severity ratin Client " tolerates and angel with physical discomfort and is able to get hte services that the client needs.    REASONS SEVERITY WAS ASSIGNED (What physical/medical problems does this person have that would inhibit his or her ability to participate in treatment? What issues does he or she have that require assistance to address?)  Pt reported having type 2 diabetes and pain in feet, hands, and Hips. Pt denied any pain during this assessment. Pt reported pain is well controlled with medications.     DIMENSION III - Emotional, Behavioral, Cognitive Conditions and Complications   1. (Optional) Tell me what it was like growing up in your family. (substance use, mental health, discipline, abuse, support)  Pt reported he was raised by his mother. Pt reported he is the oldest of 7 siblings and has 3 brther and 3 sisters.   CHILDHOOD/FAMILY LIFE- Pt reported he had a pretty good childhood.   PARENTS- Pt reported his mother worked for [employer] for 30 years until [employer]. Pt reported he did not know his father, but believed he worked construction and passed away in .   SIBLINGS-Pt reported his siblings are doing well in [place], Pt reported 1 of his sisters  in .     Substance Use;  Pt reported his siblings drink alcohol socially, but denied any of them having substance use problems.     Mental Health;   Pt reported being told his maternal gran father had a mental illness.  Abuse;  Pt denied experiencing any sort of abuse growing up.     2. When was the last time that you had significant problems   A. With feeling very trapped, lonely, sad, blue, depressed or hopeless about the future? Patient reported being afraid of the voices teling him to harm himself. Pt also repoyted daily depression since the death of his mother 18.  B. With sleep trouble, such as bad dreams, sleeping restlessly, or falling asleep during the day? Pt reported he has srtuggled constantly with trying to sleep.     C. With feeling very  anxious, nervous, tense, scared, panicked, or like something bad was going to happen? Pt reported feeling more anxious recently.     D. With becoming very distressed and upset when something reminded you of the past? Pt reported daily occurrance with feeling distressed about the death of his mother.     E. With thinking about ending your life or committing suicide? Pt reported being afraid of the voices telling him to jump in front of the train. Pt said the voices have stopped since he entered the hospital and began taking medications.     3. When was the last time that you did the following things two or more times?  A. Lied or conned to get things you wanted or to avoid having to do something? Never     B. Had a hard time paying attention at school, work, or home? Never    C. Had a hard time listening to instructions at school, work, or home? Never    D. Were a bully or threatened other people? Pt reported he threatened another johana about the TV while at [facility] [place]    E. Started physical fights with other people? Never    Note: These questions are from the Global Appraisal of Individual Needs--Short Screener. Any item marked  past month  or  2 to 12 months ago  will be scored with a severity rating of at least 2. For each item that has occurred in the past month or past year ask follow up questions to determine how often the person has felt this way or has the behavior occurred? How recently? How has it affected their daily living? And, whether they were using or in withdrawal at the time?  Pt reported his answers to 2A, B, C, and D are related to his mental health and grief & Loss surrounding the death of his mother.     Any history of suicide in your family? Or someone close to you? no    4B. If the person answered item 2E  in the past month  ask about  intent, plan, means and access and any other follow-up information  to determine imminent risk. Document any actions taken to intervene  on any identified imminent  risk.     Pt denied his is not currently feeling SI/HI/SIB. Pt reported being stable mentally with medications.     5A. Have you ever been diagnosed with a mental health problem? yes, Explain: Pt reported diagnosis od PTSD, Anxiety, paranoid schizophrenia,.  5B. Are you receiving care for any mental health issues? yes If yes, what is the focus of that care or treatment? Are you satisfied with the service? Most recent appointment? How has it been helpful?    Pt reported he attends mental health groups on the unit in addition to individual therapy and medications.    6A. Have you been prescribed medications for emotional/psychological problems? yes  6B. Current mental health medication(s) If these medications are listed for Dimension II, reference item II-5.    Wellbutrin XL, Gabapentin    6C. Are you taking your medications as instructed? yes    7A. Does your MH provider know about your use? yes  7B. What does he or she have to say about it? (DSM) Pt reported he has been asked about what substances he uses.     8A. Have you ever been verbally, emotionally, physically or sexually abused? no  Follow up questions to learn current risk, continuing emotional impact. n/a  8B. Have you received counseling for abuse? no    9A. Have you ever experienced or been part of a group that experienced community violence, historical trauma, rape or assault? no  9B. How has that affected you? n/a  9C. Have you received counseling for that? no    10A. [place]: no  10B. Exposure to Combat? no    11. Do you have problems with any of the following things in your daily life? None    Note: If the person has any of the above problems, how do they deal with them, have they developed coping mechanisms? Have they received treatment? Follow up with items 12, 13, and 14. If none of the issues in item 11 are a problem for the person, skip to item 15.    Pt reported his main problems are maintaining medication compliance ands avoiding illicit drug  use.     12. Have you been diagnosed with traumatic brain injury or Alzheimer s? no    13. If the answer to #12 is no, ask the following questions:    Have you ever hit your head or been hit on the head? no    Were you ever seen in the Emergency Room, hospital, or by a doctor because of an injury to your head? no    Have you had any significant illness that affected your brain (brain tumor, meningitis, West Nile Virus, stroke or seizure, heart attack, near drowning or near suffocation)? no    14. If the answer to # 12 is yes, ask if any of the problems identified in #11 occurred since the head injury or loss of oxygen no    15A. Highest grade of school completed: H.S. Graduate  15B. Do you have a learning disability? no  15C. Did you ever have tutoring in Math or English? no.  15D. Have you ever been diagnosed with Fetal Alcohol Effects or Fetal Alcohol Syndrome? no    Explain:     16. If yes to item 15 B, C, or D: How has this affected your use or been affected by your use?   Pt denied any effects    Dimension III Ratings   Emotional/Behavioral/Cognitive - The placing authority must use the criteria in Dimension III to determine a client s emotional, behavioral, and cognitive conditions and complications.   RISK DESCRIPTIONS - Severity ratin Client has difficulty with impulse control and lacks coping skills. Client has thoughts of suicide or harm to others without means; however, the thoughts may interfere with participation in some treatment activities. Client has difficulty functioning in significant life areas. Client has moderate symptoms of emotional, behavioral, or cognitive problems. Client is able to participate in most treatment activities.    REASONS SEVERITY WAS ASSIGNED - What current issues might with thinking, feelings or behavior pose barriers to participation in a treatment program? What coping skills or other assets does the person have to offset those issues? Are these problems that can be  initially accommodated by a treatment provider? If not, what specialized skills or attributes must a provider have?  Pt lacks coping skills needed to reduce and/or alleviate symptoms of his mental health.Pt reported inconsistent medication compliance. Pt also reported using llicit drugs to medicate past grief & Loss issues associated with the death of his mother and sister.     DIMENSION IV - Readiness for Change   1. You ve told me what brought you here today. (first section) What do you think the problem really is? Pt reported he has not been inconsistent in taking prescribed medications. Pt also reported illicit drug use are his maim problems.   2. Tell me how things are going. Ask enough questions to determine whether the person has use related problems or assets that can be built upon in the following areas: Family/friends/relationships; Legal; Financial; Emotional; Educational; Recreational/ leisure; Vocational/employment; Living arrangements (DSM)     Overall- Pt reported he cannot complain, but would like to get permanent housing  Relationships-Pt reported things are good with family. Pt reported no relationships outside of family.   Legal- Pt denied any legal concerns at this time.   Financial-Pt reported he is ok financially.   Emotional-Pt reported he thinks about his mother and family a lot.   Education- Pt reported no plans or concerns in this area.   Recreation/Leisure-Pt reported going out to the movies and dinner.  Employment-Pt reported he currently receivers SSI monthly.   Living-Pt reported he sometimes wishes he was not living.     3. What activities have you engaged in when using alcohol/other drugs that could be hazardous to you or others (i.e. driving a car/motorcycle/boat, operating machinery, unsafe sex, sharing needles for drugs or tattoos, etc   Pt denied participation in any hazardous activities while using substances.     4. How much time do you spend getting, using or getting over using  alcohol or drugs? (DSM)   Pt reported the amount of time spend on drugs would depend on the amount of money he has available.     5. Reasons for drinking/drug use (Use the space below to record answers. It may not be necessary to ask each item.)  Like the feeling No   Trying to forget problems Yes   To cope with stress Yes   To relieve physical pain Yes   To cope with anxiety Yes   To cope with depression Yes   To relax or unwind No   Makes it easier to talk with people No   Partner encourages use No   Most friends drink or use No   To cope with family problems Yes   Afraid of withdrawal symptoms/to feel better No   Other (specify) No     A. What concerns other people about your alcohol or drug use/Has anyone told you that you use too much? What did they say? (DSM)  Pt reported people who care about him do not know about his drug use.     B. What did you think about that/ do you think you have a problem with alcohol or drug use?   Pt reported he has a serious drug problem and wants to get help.     6. What changes are you willing to make? What substance are you willing to stop using? How are you going to do that? Have you tried that before? What interfered with your success with that goal?   Pt reported he wants to be abstinent from all illicit drug use.     7. What would be helpful to you in making this change?     Pt reported residential treatment would be helpful.     Dimension IV Ratings   Readiness for Change - The placing authority must use the criteria in Dimension IV to determine a client s readiness for change.   RISK DESCRIPTIONS - Severity ratin Client is motivated with active reinforcements.   REASONS SEVERITY WAS ASSIGNED - (What information did the person provide that supports your assessment of his or her readiness to change? How aware is the person of problems caused by continued use? How willing is she or he to make changes? What does the person feel would be helpful? What has the person been  able to do without help?)  Pt appears motivated, but recent behaviors do not support verbalized statements for change. Pt appears to be in the contemplation stage of change regarding his substance use disorder.       DIMENSION V - Relapse, Continued Use, and Continued Problem Potential   1. In what ways have you tried to control, cut-down or quit your use? If you have had periods of sobriety, how did you accomplish that? What was helpful? What happened to prevent you from continuing your sobriety? (DSM)   Pt reported he was sober from 2017 until December 2019. Pt reported he relapsed close to the anniversary of his mother's death.  1B. What were the circumstances of your most recent relapse with mood altering chemicals?  Pt reported the trauma of his mother's death along with the death of his sister and pt has a son on death row.     2. Have you experienced cravings? If yes, ask follow up questions to determine if the person recognizes triggers and if the person has had any success in dealing with them.   Pt reported he does not have cravings, but says he just does drugs whenever he has money.    3A. Have you been treated for alcohol/other drug abuse/dependence? no  3B. Number of times (Lifetime) (over what period): n/a   3C. Number of times completed treatment (Lifetime): n/a  3D. During the past 3 years have you participated in outpatient and/or residential? no  3E. When and where? n/a  3F. What was helpful? n/a What was not? n/a    4. Support group participation: Have you/do you attend support group meetings to reduce/stop your alcohol/drug use? How recently? What was your experience? Are you willing to restart? If the person has not participated, is he or she willing?     Pt reported he has never attended sober support meetings, but would be willing to try NA.     5. What would assist you in staying sober/straight? Pt reported going to treatment would be helpful.     Dimension V Ratings   Relapse/Continued  Use/Continued problem potential - The placing authority must use the criteria in Dimension V to determine a client s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

## 2020-11-16 NOTE — PROGRESS NOTES
Brody   attended 1 of 3 OT groups today. No changes noted. Agreeable and sociable. Discussed music and other topics in pop culture during OT clinic this afternoon.     11/16/20 1700   Occupational Therapy   Type of Intervention structured groups   Response Initiates, socially acceptable   Hours 1

## 2020-11-16 NOTE — PLAN OF CARE
"  Problem: Suicidal Behavior  Goal: Suicidal Behavior is Absent or Managed  Outcome: Improving     RN Note:    Pt presented with euthymic affect. Pt was calm and cooperative while interacting with the writer. Pt was alert and oriented x 4. Pt denied having SI, HI, thoughts of SIB, and hallucinations. Pt denied having a wish to be dead. Pt denied having physical pain. Pt denied having medical concerns. Pt feels he slept well last evening. Pt feels the current ordered medications are working well. When pt was asked to elaborate on this notion the pt stated, \" I'm feeling alright.\" No medication side effects endorsed by the pt or observed by the writer. Pt was provided an opportunity to ask the writer questions. Pt denied having questions for the writer. Pt was present in the milieu. Continue to monitor for safety and changes in medical condition.     No PRN medications administered this shift  "

## 2020-11-16 NOTE — PROGRESS NOTES
Pt had a good shift. Pt was visible and present in the milieu for the majority of the shift, spent time watching TV, socializing with peers and staff, and participating in group. Pt presents with a neutral to full range affect and a calm mood. During check in, pt reported low levels of both anxiety and depression, rating them both as a 3/10. Pt denied SI/SIB, hallucinations and medication side effects. No concerns observed or reported. Writer will continue to monitor.        11/15/20 1900   Psycho Education   Type of Intervention 1:1 intervention   Response participates, initiates socially appropriate   Hours 0.5   Treatment Detail Check in   Behavioral Health   Hallucinations denies / not responding to hallucinations   Thinking distractable;poor concentration   Orientation person: oriented;place: oriented;date: oriented;time: oriented   Memory baseline memory  (Unsubstantiated)   Insight insight appropriate to situation   Judgement impaired   Eye Contact at examiner;into space   Affect other (see comments)  (Neutral)   Mood mood is calm   1. Wish to be Dead (Recent) No   2. Non-Specific Active Suicidal Thoughts (Recent) No   Activities of Daily Living   Hygiene/Grooming independent   Oral Hygiene independent   Dress independent   Laundry with supervision   Room Organization independent

## 2020-11-16 NOTE — PROGRESS NOTES
" 11/15/20 2100   PsychoTherapy   Type of Intervention structured groups   Response Participated with encouragement    Hours 1   Treatment Detail    Psychotherapy- mindfulness mandalas/ developing a daily mindfulness practice   The  Psychotherapy group goal is to promote insight to positive choice and change. Group processing is within a supportive and safe environment. Patients will process emotions using verbal group and expressive psychotherapy interventions including visual art/writing interventions.     Group interventions support patients by : cultivating resilience, fostering self awareness, self expression, self esteem and self compassion.  Groups will provide tools to encourage self and others in group, to practice communication/ social skills and supports, learn positive coping mechanisms, self efficacy, conflict resolution, empowerment, optimism ,hope , understanding ones emotions,and a sense of self and community.  Tools will be provided to manage life stressors  and individual's diagnosis      Modalities to reach these goals include: positive and solution focused psychology, CBT, DBT, ACT, Narrative psychology, Adlerian psychology, FLOW, Expressive Arts Continuum Therapies( Art Therapy) and Mindfulness based directives and discussions.        Subjective -patient report of mood today and word for the mindful moment. Pt said they were feeling \"pretty good\".Their word for the mindful moment was  boring.     Objective/ Intervention- Goal of group and Therapeutic modality utilized-mindfulness practice        Group Response- Engaged     Patient Response-pt was engaged and social. He appropriately tried to engage a peer about their name and their art when they were not sharing. They did open up somewhat due to Brody's encouragement. Pt enjoyed working with bright colors. He was proud of an abstract painting he made in OT, a large painting he said he would frame because he had never made anything like it in his " whole life. He wanted to share this painting with writer.Writer printed him some information about the artist Jeff Payne. Brody's  work reminded writer of Kerry. He was so excited that his style was shared by a famous . He also requested some rock music that was soothing and mindful for him. Pt was pleasant and cooperative.     Reg Rowan, REENA, ATR

## 2020-11-16 NOTE — PROGRESS NOTES
"    ----------------------------------------------------------------------------------------------------------  [facility]  Psychiatric Progress Note  Hospital Day #11    Brody Colindres MRN# [id number]   Age: 60 year old YOB: 1960   Date of Admission: 11/4/2020     Subjective   The patient was discussed with the treatment team and chart notes were reviewed.      One-liner: Brody Colindres is a 60 year old male with a documented history of depression, bipolar disorder, schizophrenia, anxiety, severe polysubstance use disorder (mainly cocaine), PTSD, Insomnia, and T2DM with neuropathy who presented for  evaluation of SI in the context of stressors including recent move, unstable housing, unemployment, substance use (cocaine), and significant history of trauma. Legal status is Voluntary. Assessment is that the current presentation is consistent with evolving differential most likely major depressive disorder and symptoms of PTSD vs adjustment disorder.     Sleep:  6 hours (11/16/20 0700)  Prescribed Medications: Taken as prescribed   PRN Medications: acetaminophen, glucose **OR** dextrose **OR** glucagon, hydrOXYzine, magnesium hydroxide, nicotine, OLANZapine **OR** OLANZapine     Yesterday's changes:   - Awaiting updates for possible CD placement     Overnight nursing notes:   - Noted as pleasant, watching TV, football, smiling, enjoying art OT. Denied SI.    Staff report: Awaiting discharge paperwork from 10/24 from [facility] to determine CD placement    Patient interview:  Brody Colindres reports feeling overall \"good, pretty good\". Denies suicidal ideation for the past couple days. Has been waking at night to urinate more frequently. Recalls that it is similar to when he first was diagnosed with diabetes. Denies polydipsia, dysuria, hesitancy, dribbling, fevers, chills, or other signs of urinary infection.     --------------------------  Review of " "systems:    Patient has no bothersome physical symptoms; patient denies acute concerns.     Objective   BP (!) 154/110 (BP Location: Left arm)   Pulse 74   Temp 98.6  F (37  C) (Oral)   Resp 18   Ht 1.854 m (6' 1\")   Wt 110 kg (242 lb 8 oz)   SpO2 100%   BMI 31.99 kg/m    Weight is 242 lbs 8 oz  Body mass index is 31.99 kg/m .    Mental Status Examination:    Oriented to:  Grossly Oriented  General:  Awake  Appearance:  appears stated age, Posture is hunched over in bed and Grooming is adequate  Behavior:  calm, cooperative and engaged  Eye Contact:  poor due to looking down at the floor and not computer screen for majority of interview  Psychomotor:  no abnormal motor symptoms appreciated no catatonia present  Speech:  soft volume/tone, spontaneous and with good articulation  Language: Fluent in English with appropriate syntax and vocabulary.  Mood:  \"Good, pretty good\"  Affect:  congruent with mood, stable and broad/full  Thought Process:  coherent, linear, logical and concrete  Thought Content:  No SI/HI/AH/VH; No apparent delusions  Associations:  intact  Insight:  fair due to recognition of illness though at times vague and unclear if he has experienced psychosis or not    Judgment:  partial due to status as voluntary and seeking help, though has previous pattern of admits then CD treatment then leaving or being kicked out   Attention Span:  grossly intact  Concentration:  grossly intact  Recent and Remote Memory:  not formally assessed  Fund of Knowledge:  average       Allergies     No Known Allergies     Labs & Imaging     Data   Recent Results (from the past 72 hour(s))   Glucose by meter    Collection Time: 11/13/20  8:29 AM   Result Value Ref Range    Glucose 93 70 - 99 mg/dL   Glucose by meter    Collection Time: 11/13/20  5:19 PM   Result Value Ref Range    Glucose 192 (H) 70 - 99 mg/dL   Glucose by meter    Collection Time: 11/15/20  9:18 AM   Result Value Ref Range    Glucose 81 70 - 99 mg/dL "     Pending Results      Assessment     Primary psychiatric diagnoses:   # Depressive disorder, unspecified (manic depressive disorder vs PTSD vs adjustment disorder)    Secondary psychiatric diagnoses of concern this admission:   # PTSD  # Anxiety  # Cocaine use disorder, severe  # Polysubstance use disorder (amphetamine, cannabis, nicotine)     Diagnostic Impression:                Brody Colindres is a 60 year old male with a documented history of depression, bipolar disorder, schizophrenia, anxiety, polysubstance use disorder, severe (mainly cocaine), PTSD and T2DM with neuropathy who presents to the [facility] on 11/5/2020 for evaluation of suicidal ideation in the context of stressors including recent move, unstable housing, unemployment, and substance use (cocaine). Substance use is likely a contributing factor to their presentation. Psychosocial factors contributing to current presentation include loss of loved ones (son murdered, another son is in death row), trauma while incarcerated, unemployment and unstable housing.                   The patient's definitive diagnosis still evolving and includes manic depressive disorder vs PTSD vs adjustment disorder. Patient denies current or past history of artem or psychosis, so current differential accounts for SI and depressive symptoms. Does not state a history of artem or psychosis outside of substance use.     PTA Medications:   Prior to Admission Medications   Prescriptions Last Dose Informant Patient Reported? Taking?   FLUoxetine (PROZAC) 20 MG capsule Past Week  No Yes   Sig: Take 1 capsule (20 mg) by mouth daily   alcohol swab prep pads   No No   Sig: Use to swab area of injection/nicci as directed.   blood glucose (NO BRAND SPECIFIED) test strip   No No   Sig: Use to test blood sugar 4 times daily or as directed. To accompany: Blood Glucose Monitor Brands: per insurance.   blood glucose calibration (NO BRAND SPECIFIED) solution   No  No   Sig: To accompany: Blood Glucose Monitor Brands: per insurance.   blood glucose monitoring (NO BRAND SPECIFIED) meter device kit   No No   Sig: Use to test blood sugar 4 times daily or as directed. Preferred blood glucose meter OR supplies to accompany: Blood Glucose Monitor Brands: per insurance.   gabapentin (NEURONTIN) 400 MG capsule Past Week Self No Yes   Sig: Take 1 capsule (400 mg) by mouth 3 times daily   insulin glargine (LANTUS PEN) 100 UNIT/ML pen Past Week  No Yes   Sig: Inject 15 Units Subcutaneous every 24 hours   Patient taking differently: Inject 15 Units Subcutaneous every 24 hours Takes @ bedtime   liraglutide (VICTOZA) 18 MG/3ML solution Past Week  No Yes   Sig: Inject 0.6 mg Subcutaneous daily (with dinner)   naltrexone (DEPADE/REVIA) 50 MG tablet Past Week  Yes Yes   Sig: Take 50 mg by mouth daily   thin (NO BRAND SPECIFIED) lancets   No No   Sig: Use with lanceting device. To accompany: Blood Glucose Monitor Brands: per insurance.      Facility-Administered Medications: None        Psychiatric course:   Brody Colindres was admitted to station 22 with attending Melissa Mckinley MD as a voluntary patient, and was treated in the therapeutic milieu with appropriate individual and group therapies. On admission, he was noted as tense, anxious, and guarded. PTA daily fluoxetine 20 mg PO and naltrexone 50 mg PO were continued for depression and substance use disorders.   On hospital day 1, Brody stated that he was happy to be in the hospital but endorsed SI, severe depressive symptoms, and appeared tense and anxious with a restricted/blunted affect. He felt his current medications were not working for him. Escitalopram 10 mg PO QD was started, and fluoxetine was D/Naveed d/t inefficacy and to minimize polypharmacy. Hydroxyzine 25 mg Q4H PRN was started for anxiety. Brody also endorsed interest in entering chemical dependency treatment.   On 11/10/2020, pt reporting increased difficulty with sleep both  falling asleep and staying asleep. Still reporting unchanged thoughts of suicide with no plan. Was increasingly conflicted about possible paranoia/hallucinations with respect to individuals he claimed were pursuing him. Escitalopram increased to 15 mg and started trazodone 100 mg at bedtime for sleep.    11/11/2020, pt reports slight improvement in sleep and mood. Denying suicidal ideation to staff and mental health team.                    Brody Colindres continued to meet criteria for inpatient hospitalization medication optimization, inpatient stabilization, and appropriate discharge planning.     Collateral:   None    Medical course:   Brody Colindres was medically cleared by the ED prior to admission to the unit.    11/16/2020 pt reports increasing nocturia. Denies symptoms of UTI (dysuria, fevers, chills). Plan to obtain urinary labs and screen for SIADH.    Consults:   - None     Plan   Continue medication management while monitoring SI, depressive symptoms.     Today's Changes:   - Labs for nocturia; UA, CMP, Urine Osm    Continue:    Psychiatric diagnoses and management:    # Depressive disorder, unspecified type  # Anxiety  # PTSD  - Escitalopram 15 mg PO daily  - Hydroxyzine 5 mg PO Q4H PRN    # Cocaine use disorder, severe  # Polysubstance use disorder (amphetamine, cannabis, nicotine)   - Naltrexone 50 mg PO daily  - CD consult    -- PRN meds; acetaminophen, glucose **OR** dextrose **OR** glucagon, hydrOXYzine, magnesium hydroxide, nicotine, OLANZapine **OR** OLANZapine      Pertinent Medical diagnoses and management:    #Nocturia  - Labs today- UA with culture, CMP, Urine Osm    # Insomnia  - Trazodone 100 mg PO at bedtime for sleep    # T2DM  - Insulin glargine (LANTUS PEN) 100 UNIT/ML, 15 Units Subcutaneous every 24 hours  - Liraglutide (VICTOZA) 18 MG/3ML solution, Inject 0.6 mg Subcutaneous daily (with dinner)  - Gabapentin (NEURONTIN) 400 MG for neuropathic pain   - Hypoglycemia protocol   -  Glucose monitoring     # Vitamin D deficiency  - D3 50 mcg daily       Discontinued Medications (& Rationale):  - Fluoxetine 20 mg daily was D/Naveed 11/6 d/t inefficacy, replaced by escitalopram      Legal Status: Voluntary      Disposition/Anticipated Discharge Date: Awaiting discharge paperwork from previous sober home to look for placement.      Safety Assessment:   Behavioral Orders   Procedures     Code 1 - Restrict to Unit     Routine Programming     As clinically indicated     Rule 25     Status 15     Every 15 minutes.     Suicide precautions     Patients on Suicide Precautions should have a Combination Diet ordered that includes a Diet selection(s) AND a Behavioral Tray selection for Safe Tray - with utensils, or Safe Tray - NO utensils          Patient was discussed with my attending physician, Dr. Melissa Mckinley MD who is in agreement with my assessment and plan.    Raheem Krause, MS3    Resident Attestation:  I was present with the medical student who participated in the service and in the documentation of the note. I have verified the history and personally performed the exam and medical decision making. I agree with the assessment and plan of care as documented in the note.    Berta Nam MD  PGY1 Psychiatry Resident      Attestation:

## 2020-11-16 NOTE — PLAN OF CARE
Work Completed  - chart review  - team meeting  -Communication with [facility] again inquiring about status of discharge summary being completed and sent to this unit.  Per [facility] staff-again contact counselor regarding completion and information request feedback was that summary would be completed tonight support staff will be faxing it to this writer tomorrow a.m.-communicated this with the CD counselor and terminally as they are waiting on this information to assist and assessment and placement recommendation.  - team rounds/pt interview via Zoom  -Discussed with patient that hope is he will have treatment updates soon also discussed looking into other options if need be such as shelter services  - post team rounds team meeting / discussion  -Reach out to utilization review staff asking for assistance in connecting with a care coordinator via patient's health insurance-per UR staff [facility] will locate this information and update  -Behavioral team discussion note completed for weekly plan of care      Discharge plan or goal  Assessment ongoing     Barriers to discharge  Medication management ongoing.  Chemical dependency treatment assessment and recommendation process ongoing.

## 2020-11-16 NOTE — PLAN OF CARE
[facility]. Social with peers. No noted psychotic symptoms. Did not attend group. He is med compliant. Denies SI and  SIB. Crystal Phillips RN

## 2020-11-16 NOTE — PROGRESS NOTES
"Pt appeared to sleep well on all 15 min checks, this night,observed awake once and described self as being\"ok,suicide precautions continue,placement issues appear primary,blood sugars appear stable,defer to team\"  "

## 2020-11-17 LAB
ALBUMIN SERPL-MCNC: 3 G/DL (ref 3.4–5)
ALP SERPL-CCNC: 62 U/L (ref 40–150)
ALT SERPL W P-5'-P-CCNC: 21 U/L (ref 0–70)
ANION GAP SERPL CALCULATED.3IONS-SCNC: 4 MMOL/L (ref 3–14)
AST SERPL W P-5'-P-CCNC: 14 U/L (ref 0–45)
BILIRUB SERPL-MCNC: 0.5 MG/DL (ref 0.2–1.3)
BUN SERPL-MCNC: 9 MG/DL (ref 7–30)
CALCIUM SERPL-MCNC: 8.5 MG/DL (ref 8.5–10.1)
CHLORIDE SERPL-SCNC: 108 MMOL/L (ref 94–109)
CO2 SERPL-SCNC: 29 MMOL/L (ref 20–32)
CREAT SERPL-MCNC: 0.9 MG/DL (ref 0.66–1.25)
GFR SERPL CREATININE-BSD FRML MDRD: >90 ML/MIN/{1.73_M2}
GLUCOSE BLDC GLUCOMTR-MCNC: 209 MG/DL (ref 70–99)
GLUCOSE BLDC GLUCOMTR-MCNC: 92 MG/DL (ref 70–99)
GLUCOSE SERPL-MCNC: 88 MG/DL (ref 70–99)
POTASSIUM SERPL-SCNC: 4.1 MMOL/L (ref 3.4–5.3)
PROT SERPL-MCNC: 7 G/DL (ref 6.8–8.8)
SODIUM SERPL-SCNC: 141 MMOL/L (ref 133–144)

## 2020-11-17 PROCEDURE — 87591 N.GONORRHOEAE DNA AMP PROB: CPT | Performed by: STUDENT IN AN ORGANIZED HEALTH CARE EDUCATION/TRAINING PROGRAM

## 2020-11-17 PROCEDURE — 87491 CHLMYD TRACH DNA AMP PROBE: CPT | Performed by: STUDENT IN AN ORGANIZED HEALTH CARE EDUCATION/TRAINING PROGRAM

## 2020-11-17 PROCEDURE — 87086 URINE CULTURE/COLONY COUNT: CPT | Performed by: PHYSICIAN ASSISTANT

## 2020-11-17 PROCEDURE — 124N000002 HC R&B MH UMMC

## 2020-11-17 PROCEDURE — 99231 SBSQ HOSP IP/OBS SF/LOW 25: CPT | Mod: GC | Performed by: PSYCHIATRY & NEUROLOGY

## 2020-11-17 PROCEDURE — 87529 HSV DNA AMP PROBE: CPT | Performed by: STUDENT IN AN ORGANIZED HEALTH CARE EDUCATION/TRAINING PROGRAM

## 2020-11-17 PROCEDURE — H2032 ACTIVITY THERAPY, PER 15 MIN: HCPCS

## 2020-11-17 PROCEDURE — 999N001017 HC STATISTIC GLUCOSE BY METER IP

## 2020-11-17 PROCEDURE — G0177 OPPS/PHP; TRAIN & EDUC SERV: HCPCS

## 2020-11-17 PROCEDURE — H0001 ALCOHOL AND/OR DRUG ASSESS: HCPCS | Mod: HF

## 2020-11-17 PROCEDURE — 99207 PR CONSULT E&M CHANGED TO INITIAL LEVEL: CPT | Performed by: PHYSICIAN ASSISTANT

## 2020-11-17 PROCEDURE — 250N000013 HC RX MED GY IP 250 OP 250 PS 637: Performed by: STUDENT IN AN ORGANIZED HEALTH CARE EDUCATION/TRAINING PROGRAM

## 2020-11-17 PROCEDURE — 99221 1ST HOSP IP/OBS SF/LOW 40: CPT | Performed by: PHYSICIAN ASSISTANT

## 2020-11-17 PROCEDURE — 86481 TB AG RESPONSE T-CELL SUSP: CPT | Performed by: STUDENT IN AN ORGANIZED HEALTH CARE EDUCATION/TRAINING PROGRAM

## 2020-11-17 PROCEDURE — 36415 COLL VENOUS BLD VENIPUNCTURE: CPT | Performed by: STUDENT IN AN ORGANIZED HEALTH CARE EDUCATION/TRAINING PROGRAM

## 2020-11-17 PROCEDURE — 80053 COMPREHEN METABOLIC PANEL: CPT | Performed by: STUDENT IN AN ORGANIZED HEALTH CARE EDUCATION/TRAINING PROGRAM

## 2020-11-17 RX ADMIN — TRAZODONE HYDROCHLORIDE 100 MG: 100 TABLET ORAL at 20:58

## 2020-11-17 RX ADMIN — GABAPENTIN 400 MG: 400 CAPSULE ORAL at 20:58

## 2020-11-17 RX ADMIN — INSULIN GLARGINE 15 UNITS: 100 INJECTION, SOLUTION SUBCUTANEOUS at 20:57

## 2020-11-17 RX ADMIN — GABAPENTIN 400 MG: 400 CAPSULE ORAL at 08:11

## 2020-11-17 RX ADMIN — Medication 50 MCG: at 08:12

## 2020-11-17 RX ADMIN — NALTREXONE HYDROCHLORIDE 50 MG: 50 TABLET, FILM COATED ORAL at 08:11

## 2020-11-17 RX ADMIN — LIRAGLUTIDE 0.6 MG: 6 INJECTION SUBCUTANEOUS at 18:55

## 2020-11-17 RX ADMIN — ESCITALOPRAM OXALATE 15 MG: 10 TABLET ORAL at 08:11

## 2020-11-17 RX ADMIN — GABAPENTIN 400 MG: 400 CAPSULE ORAL at 14:05

## 2020-11-17 ASSESSMENT — ACTIVITIES OF DAILY LIVING (ADL)
DRESS: SCRUBS (BEHAVIORAL HEALTH);INDEPENDENT
HYGIENE/GROOMING: INDEPENDENT
ORAL_HYGIENE: INDEPENDENT

## 2020-11-17 ASSESSMENT — MIFFLIN-ST. JEOR: SCORE: 1988.82

## 2020-11-17 NOTE — PLAN OF CARE
Work Completed  - chart review  - team meeting  - RE - CD assessment planning - received discharge summary from [facility] - forwarded on to CD  for review/inclusion with CD assessment.   - team rounds/pt interview via Zoom  - post team rounds team meeting / discussion  - communicated with CD  re recommendations - will follow up with programs and recommendations as outlined in their note.   - call received from [facility] Access Team - they report received CD assessment/ referral and request H&P and lat 72 hours of progress notes be faxed to them at [phone]- they are putting together his information for review. This writer faxed requested information.       Discharge plan or goal  referral for residential MICD treatment program    Barriers to discharge  Medication management and assessment ongoing - CD assessment completed today - programs will need to review and make decision if patient is accepted to programming.

## 2020-11-17 NOTE — PLAN OF CARE
Topic: Addiction     Hours: 1.0     Response: Patient participated in mental health group.He was very pleasant and polite in all interactions. Calm, cooperative, and engaged throughout. Affect appeared to brighten in interactions. Attention span seemed appropriate.    Patient watched a video on addiction - brain chemistry - disease model followed by a discussion.  Patient did not participate in the discussion, but was engaged.    Facilitator: JESUS Grigsby, TRINYC

## 2020-11-17 NOTE — PROGRESS NOTES
Pt visible in milieu, participating in groups, social with peers.  Pt observed watching tv, walking the sales.  Pt appears calm, smiling and laughing in the lounge, pleasant upon approach.  Pt received his shoes from his locker.  Pt is eating meals.  No SI/SIB observed.

## 2020-11-17 NOTE — PLAN OF CARE
Problem: General Rehab Plan of Care  Goal: Therapeutic Recreation/Music Therapy Goal (Art Therapy)  Description: The patient and/or their representative will achieve their patient-specific goals related to the plan of care.  The patient-specific goals include: emotional expression, identifying positive strengths and goals  Outcome: No Change   Art therapy directive is to create a drawing that represents a character of strength (a stable and supportive person pt has known at some point in their life) and to complete and answer the followin) Visualize the qualities of this character of strength and the ways you may already embody some of the these qualities.  2) When you are struggling, imagine what your character of strength would do.  Goals of directive: emotional expression, identifying positive strengths and goals  Pt was a quiet participant, chose not to participant, rather quietly observed group and listened to music. Author briefly checked in with pt at the end of group because pt typically participates in author's groups and pt appeared to be in a depressed mood. Pt told author that his character of strength is his mother who passed away fairly recently. Pt said he could not create an image representing her at this time and referred to still grieving the loss of his mother. Pt added that her death was a factor in pt moving from [place] to [place] to start a new chapter or new beginning in his life. Author validated pts feelings and encouraged pt to talk to staff if need be.

## 2020-11-17 NOTE — PROGRESS NOTES
"Brody  attended 3 of 3 OT groups today. Pleasant and participative. Enthusiastic about the structured group project this afternoon (making peanut butter cookies). During a discussion group later he was asked to identify something he likes about himself. Pt adamantly resisted the question, saying \"there's absolutely nothing.\" Despite his evidently low self-esteem, pt was receptive to supportive comments from peers about his various positive qualities.      11/17/20 1500   Occupational Therapy   Type of Intervention structured groups   Response Initiates, socially acceptable   Hours 3       "

## 2020-11-17 NOTE — PROGRESS NOTES
"    ----------------------------------------------------------------------------------------------------------  [facility]  Psychiatric Progress Note  Hospital Day #12    Brody Colindres MRN# [id number]   Age: 60 year old YOB: 1960   Date of Admission: 11/4/2020     Subjective   The patient was discussed with the treatment team and chart notes were reviewed.      One-liner: Brody Colindres is a 60 year old male with a documented history of depression, bipolar disorder, schizophrenia, anxiety, severe polysubstance use disorder (mainly cocaine), PTSD, Insomnia, and T2DM with neuropathy who presented for  evaluation of SI in the context of stressors including recent move, unstable housing, unemployment, substance use (cocaine), and significant history of trauma. Legal status is Voluntary. Assessment is that the current presentation is consistent with evolving differential most likely major depressive disorder and symptoms of PTSD vs adjustment disorder.     Sleep:  7.5 hours (11/17/20 0641)  Prescribed Medications: Taken as prescribed   PRN Medications: acetaminophen, glucose **OR** dextrose **OR** glucagon, hydrOXYzine, magnesium hydroxide, nicotine, OLANZapine **OR** OLANZapine     Yesterday's changes:   - Awaiting updates for possible CD placement     Overnight nursing notes:   - Pleasant, doing well, denies SI. Worried about possible discharge to Shelter as he is concerned over COVID infection.    Staff report: Obtained [facility] paperwork, this was sent to coordinator to determine CD placement. Plan to obtain first available bed, if possible away from cities to assist in sobriety.     Patient interview:  Brody Colindres noted to be hunched over, soft spoken, conversant, responding to questions appropriately as in past days. Reports feeling overall, \"okay\". Denies SI. States that sleep is also okay although he is still waking at night to urinate.     Nocturia " "has not worsened or improved since yesterday. Continues to deny any dysuria or other urinary symptoms. Has not had any recent STD screening, agreeable to having that performed today. Reports a history of TB for which he received 6 months for treatment in the past.    --------------------------  Review of systems:    Patient has no bothersome physical symptoms; patient denies acute concerns.     Objective   BP (!) 172/91 (BP Location: Right arm)   Pulse 72   Temp 98.9  F (37.2  C) (Temporal)   Resp 16   Ht 1.854 m (6' 1\")   Wt 110 kg (242 lb 8 oz)   SpO2 100%   BMI 31.99 kg/m    Weight is 242 lbs 8 oz  Body mass index is 31.99 kg/m .    Mental Status Examination:    Oriented to:  Grossly Oriented  General:  Awake  Appearance:  appears stated age, Posture is hunched over in bed and Grooming is adequate  Behavior:  calm, cooperative and engaged  Eye Contact:  poor due to looking down at the floor and not computer screen for majority of interview  Psychomotor:  no abnormal motor symptoms appreciated no catatonia present  Speech:  soft volume/tone, spontaneous and with good articulation  Language: Fluent in English with appropriate syntax and vocabulary.  Mood:  \"Okay\"  Affect:  congruent with mood, stable and broad/full  Thought Process:  coherent, linear, logical and concrete  Thought Content:  No SI/HI/AH/VH; No apparent delusions  Associations:  intact  Insight:  fair due to recognition of illness though at times vague and unclear if he has experienced psychosis or not    Judgment:  partial due to status as voluntary and seeking help, though has previous pattern of admits then CD treatment then leaving or being kicked out   Attention Span:  grossly intact  Concentration:  grossly intact  Recent and Remote Memory:  grossly intact  Fund of Knowledge:  average       Allergies     No Known Allergies     Labs & Imaging     Data   Recent Results (from the past 72 hour(s))   Glucose by meter    Collection Time: " 11/15/20  9:18 AM   Result Value Ref Range    Glucose 81 70 - 99 mg/dL   Glucose by meter    Collection Time: 11/16/20  7:56 AM   Result Value Ref Range    Glucose 216 (H) 70 - 99 mg/dL   Glucose by meter    Collection Time: 11/16/20  5:08 PM   Result Value Ref Range    Glucose 206 (H) 70 - 99 mg/dL   UA with Microscopic    Collection Time: 11/16/20  5:13 PM   Result Value Ref Range    Color Urine Yellow     Appearance Urine Clear     Glucose Urine Negative NEG^Negative mg/dL    Bilirubin Urine Negative NEG^Negative    Ketones Urine Negative NEG^Negative mg/dL    Specific Gravity Urine 1.019 1.003 - 1.035    Blood Urine Negative NEG^Negative    pH Urine 5.5 5.0 - 7.0 pH    Protein Albumin Urine Negative NEG^Negative mg/dL    Urobilinogen mg/dL Normal 0.0 - 2.0 mg/dL    Nitrite Urine Negative NEG^Negative    Leukocyte Esterase Urine Moderate (A) NEG^Negative    Source Urine     WBC Urine 26 (H) 0 - 5 /HPF    RBC Urine 1 0 - 2 /HPF    Squamous Epithelial /HPF Urine <1 0 - 1 /HPF    Mucous Urine Present (A) NEG^Negative /LPF   Osmolality urine    Collection Time: 11/16/20  5:13 PM   Result Value Ref Range    Urine Osmolality 721 100 - 1,200 mmol/kg     Pending Results      Assessment     Primary psychiatric diagnoses:   # Depressive disorder, unspecified (manic depressive disorder vs PTSD vs adjustment disorder)    Secondary psychiatric diagnoses of concern this admission:   # PTSD  # Anxiety  # Cocaine use disorder, severe  # Polysubstance use disorder (amphetamine, cannabis, nicotine)     Diagnostic Impression:                Brody Colindres is a 60 year old male with a documented history of depression, bipolar disorder, schizophrenia, anxiety, polysubstance use disorder, severe (mainly cocaine), PTSD and T2DM with neuropathy who presents to the [facility] on 11/5/2020 for evaluation of suicidal ideation in the context of stressors including recent move, unstable housing, unemployment, and  substance use (cocaine). Substance use is likely a contributing factor to their presentation. Psychosocial factors contributing to current presentation include loss of loved ones (son murdered, another son is in death row), trauma while incarcerated, unemployment and unstable housing.                   The patient's definitive diagnosis still evolving and includes manic depressive disorder vs PTSD vs adjustment disorder. Patient denies current or past history of artem or psychosis, so current differential accounts for SI and depressive symptoms. Does not state a history of artem or psychosis outside of substance use.     PTA Medications:   Prior to Admission Medications   Prescriptions Last Dose Informant Patient Reported? Taking?   FLUoxetine (PROZAC) 20 MG capsule Past Week  No Yes   Sig: Take 1 capsule (20 mg) by mouth daily   alcohol swab prep pads   No No   Sig: Use to swab area of injection/nicci as directed.   blood glucose (NO BRAND SPECIFIED) test strip   No No   Sig: Use to test blood sugar 4 times daily or as directed. To accompany: Blood Glucose Monitor Brands: per insurance.   blood glucose calibration (NO BRAND SPECIFIED) solution   No No   Sig: To accompany: Blood Glucose Monitor Brands: per insurance.   blood glucose monitoring (NO BRAND SPECIFIED) meter device kit   No No   Sig: Use to test blood sugar 4 times daily or as directed. Preferred blood glucose meter OR supplies to accompany: Blood Glucose Monitor Brands: per insurance.   gabapentin (NEURONTIN) 400 MG capsule Past Week Self No Yes   Sig: Take 1 capsule (400 mg) by mouth 3 times daily   insulin glargine (LANTUS PEN) 100 UNIT/ML pen Past Week  No Yes   Sig: Inject 15 Units Subcutaneous every 24 hours   Patient taking differently: Inject 15 Units Subcutaneous every 24 hours Takes @ bedtime   liraglutide (VICTOZA) 18 MG/3ML solution Past Week  No Yes   Sig: Inject 0.6 mg Subcutaneous daily (with dinner)   naltrexone (DEPADE/REVIA) 50 MG  tablet Past Week  Yes Yes   Sig: Take 50 mg by mouth daily   thin (NO BRAND SPECIFIED) lancets   No No   Sig: Use with lanceting device. To accompany: Blood Glucose Monitor Brands: per insurance.      Facility-Administered Medications: None        Psychiatric course:   Brody Colindres was admitted to station 22 with attending Melissa Mckinley MD as a voluntary patient, and was treated in the therapeutic milieu with appropriate individual and group therapies. On admission, he was noted as tense, anxious, and guarded. PTA daily fluoxetine 20 mg PO and naltrexone 50 mg PO were continued for depression and substance use disorders.   On hospital day 1, Brody stated that he was happy to be in the hospital but endorsed SI, severe depressive symptoms, and appeared tense and anxious with a restricted/blunted affect. He felt his current medications were not working for him. Escitalopram 10 mg PO QD was started, and fluoxetine was D/Naveed d/t inefficacy and to minimize polypharmacy. Hydroxyzine 25 mg Q4H PRN was started for anxiety. Brody also endorsed interest in entering chemical dependency treatment.   On 11/10/2020, pt reported increased difficulty with sleep both falling asleep and staying asleep. Unchanged thoughts of suicide still with no plan. Was increasingly conflicted about possible paranoia/hallucinations with respect to individuals he claimed were pursuing him. Escitalopram was increased to 15 mg and he was started trazodone 100 mg at bedtime for sleep.    11/11/2020, pt reported slight improvement in sleep and mood. Denied suicidal ideation to staff and mental health team.                    Brody Colindres continued to meet criteria for inpatient hospitalization medication optimization, inpatient stabilization, and appropriate discharge planning.     Collateral:   None    Medical course:   Brody Colindres was medically cleared by the ED prior to admission to the unit.    11/16/2020, pt reported increased  nocturia. Denied symptoms of UTI (dysuria, fevers, chills). Planned to obtain urinary labs and screen for SIADH.  11/17/2020, pt reported continued nocturia unchanged from yesterday. Discussed UA findings consistent with sterile pyuria and possible etiologies. Plan to obtain urine GC and Quant Gold. IM consult placed.    Consults:   - None     Plan   Continue medication management while monitoring SI, depressive symptoms.     Today's Changes:   - Labs to screen for STDs and TB: Urine GC, HSV PCR, and Quant Gold due to concerns for interstitial nephritis  - IM consult placed    Continue:    Psychiatric diagnoses and management:    # Depressive disorder, unspecified type  # Anxiety  # PTSD  - Escitalopram 15 mg PO daily  - Hydroxyzine 5 mg PO Q4H PRN    # Cocaine use disorder, severe  # Polysubstance use disorder (amphetamine, cannabis, nicotine)   - Naltrexone 50 mg PO daily  - CD consult    -- PRN meds; acetaminophen, glucose **OR** dextrose **OR** glucagon, hydrOXYzine, magnesium hydroxide, nicotine, OLANZapine **OR** OLANZapine      Pertinent Medical diagnoses and management:    # Nocturia  # Sterile pyuria  - Labs today- Urine GC, Quant Gold  - IM Consult placed    # Insomnia  - Trazodone 100 mg PO at bedtime for sleep    # T2DM  - Insulin glargine (LANTUS PEN) 100 UNIT/ML, 15 Units Subcutaneous every 24 hours  - Liraglutide (VICTOZA) 18 MG/3ML solution, Inject 0.6 mg Subcutaneous daily (with dinner)  - Gabapentin (NEURONTIN) 400 MG for neuropathic pain   - Hypoglycemia protocol   - Glucose monitoring     # Vitamin D deficiency  - D3 50 mcg daily       Discontinued Medications (& Rationale):  - Fluoxetine 20 mg daily was D/Naveed 11/6 d/t inefficacy, replaced by escitalopram      Legal Status: Voluntary      Disposition/Anticipated Discharge Date: Awaiting placement for sober home, CM assisting with other possible locations for discharge if unable to obtain placement.      Safety Assessment:   Behavioral Orders    Procedures     Code 1 - Restrict to Unit     Routine Programming     As clinically indicated     Rule 25     Status 15     Every 15 minutes.     Suicide precautions     Patients on Suicide Precautions should have a Combination Diet ordered that includes a Diet selection(s) AND a Behavioral Tray selection for Safe Tray - with utensils, or Safe Tray - NO utensils          Patient was discussed with my attending physician, Dr. Melissa Mckinley MD who is in agreement with my assessment and plan.    Raheem Krause, MS3    Resident Attestation:  I was present with the medical student who participated in the service and in the documentation of the note. I have verified the history and personally performed the exam and medical decision making. I agree with the assessment and plan of care as documented in the note.    Berta Nam MD  PGY1 Psychiatry Resident    Attestation:  This patient has been seen and evaluated by me, Melissa Mckinley MD.  I have discussed this patient with the house staff team including the resident and medical student and I agree with the findings and plan in this note.    I have reviewed today's vital signs, medications, labs and imaging. Melissa Mckinley MD , PhD.

## 2020-11-17 NOTE — PROGRESS NOTES
Pt slept through the night without awakening. Observed to change position for comfort. 15 minutes checks completed. No concerns.

## 2020-11-17 NOTE — CONSULTS
11/17/2020    CD update completed.   Pt wants referrals outside of the [facility]  Will send to [facility] for review of [facility], [place] and [place].   Pt also asked about a zuleima based program. I called [facility] in [place] to see if it is residential programming. They reported they provide both in [place]. Will also send referral there.     [facility] [place]  [address], [place] [address]  Phone: [phone]  Fax: [phone]    [facility] for Referrals  Phone: [phone]  Fax: [phone]    [place]   [address] [place] [address]  Phone: [phone]  Fax [phone]    [facility]  [address], [place] [address]  Phone: [phone]  fax: [phone]    [facility]  [address] [place] [address]  Tel: [phone]  Fax: [phone]    WES Goldman  [email or web address]  [phone]
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
## 2020-11-17 NOTE — PLAN OF CARE
Pt states he is doing well , denies si . He is hoping they will find him a treatment program to go to soon . He states he does not want to go to a shelter that it would not be safe with the covid .. He is pleasant , social , [facility]

## 2020-11-18 LAB
BACTERIA SPEC CULT: NO GROWTH
C TRACH DNA SPEC QL NAA+PROBE: NEGATIVE
GLUCOSE BLDC GLUCOMTR-MCNC: 185 MG/DL (ref 70–99)
HSV1 DNA SPEC QL NAA+PROBE: NEGATIVE
HSV2 DNA SPEC QL NAA+PROBE: NEGATIVE
Lab: NORMAL
N GONORRHOEA DNA SPEC QL NAA+PROBE: NEGATIVE
SPECIMEN SOURCE: NORMAL

## 2020-11-18 PROCEDURE — 250N000013 HC RX MED GY IP 250 OP 250 PS 637: Performed by: STUDENT IN AN ORGANIZED HEALTH CARE EDUCATION/TRAINING PROGRAM

## 2020-11-18 PROCEDURE — 999N001017 HC STATISTIC GLUCOSE BY METER IP

## 2020-11-18 PROCEDURE — G0177 OPPS/PHP; TRAIN & EDUC SERV: HCPCS

## 2020-11-18 PROCEDURE — 99232 SBSQ HOSP IP/OBS MODERATE 35: CPT | Mod: GC | Performed by: PSYCHIATRY & NEUROLOGY

## 2020-11-18 PROCEDURE — 124N000002 HC R&B MH UMMC

## 2020-11-18 RX ORDER — LISINOPRIL 10 MG/1
10 TABLET ORAL DAILY
Status: DISCONTINUED | OUTPATIENT
Start: 2020-11-18 | End: 2020-11-19 | Stop reason: HOSPADM

## 2020-11-18 RX ORDER — GABAPENTIN 400 MG/1
400 CAPSULE ORAL 3 TIMES DAILY
Qty: 90 CAPSULE | Refills: 0 | Status: ON HOLD | OUTPATIENT
Start: 2020-11-18 | End: 2021-05-19

## 2020-11-18 RX ORDER — VITAMIN B COMPLEX
50 TABLET ORAL DAILY
Qty: 60 TABLET | Refills: 0 | Status: ON HOLD | OUTPATIENT
Start: 2020-11-19 | End: 2021-05-19

## 2020-11-18 RX ORDER — TRAZODONE HYDROCHLORIDE 100 MG/1
100 TABLET ORAL AT BEDTIME
Qty: 30 TABLET | Refills: 0 | Status: ON HOLD | OUTPATIENT
Start: 2020-11-18 | End: 2021-05-19

## 2020-11-18 RX ORDER — LIRAGLUTIDE 6 MG/ML
0.6 INJECTION SUBCUTANEOUS
Qty: 3 ML | Refills: 0 | Status: ON HOLD | OUTPATIENT
Start: 2020-11-18 | End: 2021-05-19

## 2020-11-18 RX ORDER — NALTREXONE HYDROCHLORIDE 50 MG/1
50 TABLET, FILM COATED ORAL DAILY
Qty: 30 TABLET | Refills: 0 | Status: ON HOLD | OUTPATIENT
Start: 2020-11-18 | End: 2021-05-19

## 2020-11-18 RX ORDER — LISINOPRIL 10 MG/1
10 TABLET ORAL DAILY
Qty: 30 TABLET | Refills: 0 | Status: ON HOLD | OUTPATIENT
Start: 2020-11-19 | End: 2021-05-19

## 2020-11-18 RX ORDER — ESCITALOPRAM OXALATE 5 MG/1
15 TABLET ORAL DAILY
Qty: 90 TABLET | Refills: 0 | Status: ON HOLD | OUTPATIENT
Start: 2020-11-19 | End: 2021-05-19

## 2020-11-18 RX ADMIN — GABAPENTIN 400 MG: 400 CAPSULE ORAL at 13:37

## 2020-11-18 RX ADMIN — GABAPENTIN 400 MG: 400 CAPSULE ORAL at 20:58

## 2020-11-18 RX ADMIN — NALTREXONE HYDROCHLORIDE 50 MG: 50 TABLET, FILM COATED ORAL at 09:22

## 2020-11-18 RX ADMIN — ESCITALOPRAM OXALATE 15 MG: 10 TABLET ORAL at 09:21

## 2020-11-18 RX ADMIN — INSULIN GLARGINE 15 UNITS: 100 INJECTION, SOLUTION SUBCUTANEOUS at 20:56

## 2020-11-18 RX ADMIN — LIRAGLUTIDE 0.6 MG: 6 INJECTION SUBCUTANEOUS at 18:11

## 2020-11-18 RX ADMIN — Medication 50 MCG: at 09:22

## 2020-11-18 RX ADMIN — TRAZODONE HYDROCHLORIDE 100 MG: 100 TABLET ORAL at 20:56

## 2020-11-18 RX ADMIN — LISINOPRIL 10 MG: 10 TABLET ORAL at 13:37

## 2020-11-18 RX ADMIN — GABAPENTIN 400 MG: 400 CAPSULE ORAL at 09:22

## 2020-11-18 ASSESSMENT — ACTIVITIES OF DAILY LIVING (ADL)
DRESS: INDEPENDENT
ORAL_HYGIENE: INDEPENDENT
LAUNDRY: WITH SUPERVISION
HYGIENE/GROOMING: INDEPENDENT

## 2020-11-18 NOTE — PLAN OF CARE
Work Completed  - chart review  - team meeting  - RE - [facility] Treatment - [facility] - call from Jamil (admissions at [facility]) she reports patient is approved for their program is being placed on the list - current wait list time is 1 - 2 weeks. He is also being reviewed for other [facility] residental programs . Her direct dial is [phone] other admission staff is Ady ph: [phone].   Call received from Francisco  at [facility] per Francisco they have an opening and could accept patient as soon as tomorrow asked for callback at [phone].  - team rounds/pt interview via Zoom  -Informed patient that he is excepted for [facility] residential programs and we are working to solidify date.  - post team rounds team meeting / discussion -this writer informed provider that I did receive a message the patient could be admitted to [facility] as soon as tomorrow if clinically ready-they do indicate he is ready for discharge.  This writer instructed them to order a 30-day supply medication.  -This writer called and spoke with Francisco informed him that providers to assess the patient is ready for discharge-he states they will arrange transportation for tomorrow confirmed address to [facility] and instructions for  to call the  when arrives.  Said he would need to call back later with exact time.  He did confirm that they would like a 30-day supply of medication sent with him.  This writer informed mack that provider would like to see patient in the morning so would be best if pickup is after 10:30 AM that he would see if could aim for 11 or later and would call this writer back.  -Met with patient over phone discussed with him the program is able to admit him tomorrow and providers to feel that medications/clinically appropriate.  Brody is excited about this plan was grateful.    -After 5 PM still no call from Francisco regarding pickup  time this writer left him voicemail asking that he call in the morning to confirm time.      Discharge plan or goal  Discharge to [facility] residential treatment program -accepted for admission to [facility] tomorrow Thursday, November 19 waiting to hear from program to confirm exact pickup time.    Barriers to discharge  Recommendation is for direct discharge to residential treatment program is accepted for tomorrow as noted above awaiting pickup time confirmation

## 2020-11-18 NOTE — PLAN OF CARE
Problem: OT General Care Plan  Goal: OT Goal 1    Pt actively participated in about half (no charge) of a structured occupational therapy group with a focus on a visual-spatial leisure task. Pt was able to follow 2-step directions of the novel task, and demonstrated particularly strategic planning and problem solving throughout the task. Pt remained focused and engaged for the first half of group, then politely offered his spot to another peer who wanted to join group. Calm, pleasant, and cooperative.

## 2020-11-18 NOTE — PLAN OF CARE
Pt states he is doing well , denies si . Smiling , pleasant , denies si . He did attend music therapy group. He states he is hoping to go to cd treatment soon

## 2020-11-18 NOTE — PROGRESS NOTES
"    ----------------------------------------------------------------------------------------------------------  [facility]  Psychiatric Progress Note  Hospital Day #13    Brody Colindres MRN# [id number]   Age: 60 year old YOB: 1960   Date of Admission: 11/4/2020     Subjective   The patient was discussed with the treatment team and chart notes were reviewed.      One-liner: Brody Colindres is a 60 year old male with a documented history of depression, bipolar disorder, schizophrenia, anxiety, severe polysubstance use disorder (mainly cocaine), PTSD, Insomnia, and T2DM with neuropathy who presented for  evaluation of SI in the context of stressors including recent move, unstable housing, unemployment, substance use (cocaine), and significant history of trauma. Legal status is Voluntary. Assessment is that the current presentation is consistent with evolving differential most likely major depressive disorder and symptoms of PTSD vs adjustment disorder.     Sleep:  7 hours (11/18/20 0600)  Prescribed Medications: Taken as prescribed   PRN Medications: acetaminophen, glucose **OR** dextrose **OR** glucagon, hydrOXYzine, magnesium hydroxide, nicotine, OLANZapine **OR** OLANZapine     Yesterday's changes:   - Labs to workup sterile pyuria  - IM Consulted    Overnight nursing notes:   - Pleasant, doing well, denies SI. Hoping to start CD treatment soon.    Staff report: Pt approved for MICD treatment at [facility], placed on list with current wait list time of 1-2 weeks. May be able to obtain location outside of cities as desired by pt. Awaiting for more updates.    Patient interview:  Brody Colindres noted to be hunched over, soft spoken, conversant, responding to questions appropriately as in past days. Mood has been stable since last Friday 11/13/2020. Reports feeling overall, \"Good! Okay\". No suicidal ideation. Happy to hear that he has been accepted " "to a CD treatment program, eager to progress in treatment.     Still having nocturia and polyuria. Unchanged from previous days, still waking to urinate twice each night. Continues to deny other symptoms such as hematuria or dysuria. Informed patient that G/C lab was negative, waiting on pending labs (Quant Gold, urine culture).    --------------------------  Review of systems:    Patient has no bothersome physical symptoms; patient denies acute concerns.     Objective   BP (!) 149/81   Pulse 77   Temp 98.5  F (36.9  C) (Oral)   Resp 16   Ht 1.854 m (6' 1\")   Wt 111.5 kg (245 lb 12.8 oz)   SpO2 99%   BMI 32.43 kg/m    Weight is 245 lbs 12.8 oz  Body mass index is 32.43 kg/m .    Mental Status Examination:    Oriented to:  Grossly Oriented  General:  Awake  Appearance:  appears stated age, Posture is hunched over in bed and Grooming is adequate  Behavior:  calm, cooperative and engaged  Eye Contact:  poor due to looking down at the floor and not computer screen for majority of interview  Psychomotor:  no abnormal motor symptoms appreciated no catatonia present  Speech:  soft volume/tone, spontaneous and with good articulation  Language: Fluent in English with appropriate syntax and vocabulary.  Mood:  \"Good! Okay\"  Affect:  congruent with mood, stable and broad/full  Thought Process:  coherent, linear, logical and concrete  Thought Content:  No SI/HI/AH/VH; No apparent delusions  Associations:  intact  Insight:  fair due to recognition of illness though at times vague and unclear if he has experienced psychosis or not    Judgment:  partial due to status as voluntary and seeking help, though has previous pattern of admits then CD treatment then leaving or being kicked out   Attention Span:  grossly intact  Concentration:  grossly intact  Recent and Remote Memory:  not formally assessed  Fund of Knowledge:  average       Allergies     No Known Allergies     Labs & Imaging     Data   Recent Results (from the past " 72 hour(s))   Glucose by meter    Collection Time: 11/15/20  9:18 AM   Result Value Ref Range    Glucose 81 70 - 99 mg/dL   Glucose by meter    Collection Time: 11/16/20  7:56 AM   Result Value Ref Range    Glucose 216 (H) 70 - 99 mg/dL   Glucose by meter    Collection Time: 11/16/20  5:08 PM   Result Value Ref Range    Glucose 206 (H) 70 - 99 mg/dL   UA with Microscopic    Collection Time: 11/16/20  5:13 PM   Result Value Ref Range    Color Urine Yellow     Appearance Urine Clear     Glucose Urine Negative NEG^Negative mg/dL    Bilirubin Urine Negative NEG^Negative    Ketones Urine Negative NEG^Negative mg/dL    Specific Gravity Urine 1.019 1.003 - 1.035    Blood Urine Negative NEG^Negative    pH Urine 5.5 5.0 - 7.0 pH    Protein Albumin Urine Negative NEG^Negative mg/dL    Urobilinogen mg/dL Normal 0.0 - 2.0 mg/dL    Nitrite Urine Negative NEG^Negative    Leukocyte Esterase Urine Moderate (A) NEG^Negative    Source Urine     WBC Urine 26 (H) 0 - 5 /HPF    RBC Urine 1 0 - 2 /HPF    Squamous Epithelial /HPF Urine <1 0 - 1 /HPF    Mucous Urine Present (A) NEG^Negative /LPF   Osmolality urine    Collection Time: 11/16/20  5:13 PM   Result Value Ref Range    Urine Osmolality 721 100 - 1,200 mmol/kg   Comprehensive metabolic panel    Collection Time: 11/17/20  7:36 AM   Result Value Ref Range    Sodium 141 133 - 144 mmol/L    Potassium 4.1 3.4 - 5.3 mmol/L    Chloride 108 94 - 109 mmol/L    Carbon Dioxide 29 20 - 32 mmol/L    Anion Gap 4 3 - 14 mmol/L    Glucose 88 70 - 99 mg/dL    Urea Nitrogen 9 7 - 30 mg/dL    Creatinine 0.90 0.66 - 1.25 mg/dL    GFR Estimate >90 >60 mL/min/[1.73_m2]    GFR Estimate If Black >90 >60 mL/min/[1.73_m2]    Calcium 8.5 8.5 - 10.1 mg/dL    Bilirubin Total 0.5 0.2 - 1.3 mg/dL    Albumin 3.0 (L) 3.4 - 5.0 g/dL    Protein Total 7.0 6.8 - 8.8 g/dL    Alkaline Phosphatase 62 40 - 150 U/L    ALT 21 0 - 70 U/L    AST 14 0 - 45 U/L   Glucose by meter    Collection Time: 11/17/20  7:55 AM   Result  Value Ref Range    Glucose 92 70 - 99 mg/dL   Glucose by meter    Collection Time: 11/17/20  4:57 PM   Result Value Ref Range    Glucose 209 (H) 70 - 99 mg/dL   Urine Culture Aerobic Bacterial    Collection Time: 11/17/20  7:07 PM    Specimen: Urine Midstream; Midstream Urine   Result Value Ref Range    Specimen Description Midstream Urine     Special Requests Specimen received in preservative     Culture Micro PENDING      Pending Results      Assessment     Primary psychiatric diagnoses:   # Depressive disorder, unspecified (manic depressive disorder vs PTSD vs adjustment disorder)    Secondary psychiatric diagnoses of concern this admission:   # PTSD  # Anxiety  # Cocaine use disorder, severe  # Polysubstance use disorder (amphetamine, cannabis, nicotine)     Diagnostic Impression:                Brody Colindres is a 60 year old male with a documented history of depression, bipolar disorder, schizophrenia, anxiety, polysubstance use disorder, severe (mainly cocaine), PTSD and T2DM with neuropathy who presents to the [facility] on 11/5/2020 for evaluation of suicidal ideation in the context of stressors including recent move, unstable housing, unemployment, and substance use (cocaine). Substance use is likely a contributing factor to their presentation. Psychosocial factors contributing to current presentation include loss of loved ones (son murdered, another son is in death row), trauma while incarcerated, unemployment and unstable housing.                   The patient's definitive diagnosis still evolving and includes manic depressive disorder vs PTSD vs adjustment disorder. Patient denies current or past history of artem or psychosis, so current differential accounts for SI and depressive symptoms. Does not state a history of artem or psychosis outside of substance use.     PTA Medications:   Prior to Admission Medications   Prescriptions Last Dose Informant Patient Reported? Taking?    FLUoxetine (PROZAC) 20 MG capsule Past Week  No Yes   Sig: Take 1 capsule (20 mg) by mouth daily   alcohol swab prep pads   No No   Sig: Use to swab area of injection/nicci as directed.   blood glucose (NO BRAND SPECIFIED) test strip   No No   Sig: Use to test blood sugar 4 times daily or as directed. To accompany: Blood Glucose Monitor Brands: per insurance.   blood glucose calibration (NO BRAND SPECIFIED) solution   No No   Sig: To accompany: Blood Glucose Monitor Brands: per insurance.   blood glucose monitoring (NO BRAND SPECIFIED) meter device kit   No No   Sig: Use to test blood sugar 4 times daily or as directed. Preferred blood glucose meter OR supplies to accompany: Blood Glucose Monitor Brands: per insurance.   gabapentin (NEURONTIN) 400 MG capsule Past Week Self No Yes   Sig: Take 1 capsule (400 mg) by mouth 3 times daily   insulin glargine (LANTUS PEN) 100 UNIT/ML pen Past Week  No Yes   Sig: Inject 15 Units Subcutaneous every 24 hours   Patient taking differently: Inject 15 Units Subcutaneous every 24 hours Takes @ bedtime   liraglutide (VICTOZA) 18 MG/3ML solution Past Week  No Yes   Sig: Inject 0.6 mg Subcutaneous daily (with dinner)   naltrexone (DEPADE/REVIA) 50 MG tablet Past Week  Yes Yes   Sig: Take 50 mg by mouth daily   thin (NO BRAND SPECIFIED) lancets   No No   Sig: Use with lanceting device. To accompany: Blood Glucose Monitor Brands: per insurance.      Facility-Administered Medications: None        Psychiatric course:   Brody Colindres was admitted to station 22 with attending Melissa Mckinley MD as a voluntary patient, and was treated in the therapeutic milieu with appropriate individual and group therapies. On admission, he was noted as tense, anxious, and guarded. PTA daily fluoxetine 20 mg PO and naltrexone 50 mg PO were continued for depression and substance use disorders.   On hospital day 1, Brody stated that he was happy to be in the hospital but endorsed SI, severe depressive  symptoms, and appeared tense and anxious with a restricted/blunted affect. He felt his current medications were not working for him. Escitalopram 10 mg PO QD was started, and fluoxetine was D/Naveed d/t inefficacy and to minimize polypharmacy. Hydroxyzine 25 mg Q4H PRN was started for anxiety. Brody also endorsed interest in entering chemical dependency treatment.   On 11/10/2020, pt reported increased difficulty with sleep both falling asleep and staying asleep. Unchanged thoughts of suicide still with no plan. Was increasingly conflicted about possible paranoia/hallucinations with respect to individuals he claimed were pursuing him. Escitalopram was increased to 15 mg and he was started trazodone 100 mg at bedtime for sleep.    11/11/2020, pt reported slight improvement in sleep and mood. Denied suicidal ideation to staff and mental health team.                    Brody Colindres continued to meet criteria for inpatient hospitalization medication optimization, inpatient stabilization, and appropriate discharge planning.     Collateral:   None    Medical course:   Brody Colindres was medically cleared by the ED prior to admission to the unit.    11/16/2020, pt reported increased nocturia. Denied symptoms of UTI (dysuria, fevers, chills). Planned to obtain urinary labs and screen for SIADH.  11/17/2020, pt reported continued nocturia unchanged from yesterday. Discussed UA findings consistent with sterile pyuria and possible etiologies. Plan to obtain urine GC and Quant Gold. IM consult placed.  11/18/2020, pt continued to report nocturia and polyuria. IM consulted, suggested that urine may have contaminate, agreed with STD screen. G/C negative, Quant Gold and urine culture pending.    Consults:   - IM consulted 11/17/2020:  Agree with GC and chlamydia urine PCR.  Will also obtain a routine urine culture and follow-up on results.  No further medical intervention indicated at this time.  The patient is medically  stable and Medicine will continue to follow up results of the aforementioned lab studies.  Please feel free to call with questions.      Plan   Continue medication management while monitoring SI, depressive symptoms.     Today's Changes:   - lisinopril for DM2 renal protection  - Awaiting placement for MICD treatment at [facility]    Continue:    Psychiatric diagnoses and management:    # Depressive disorder, unspecified type  # Anxiety  # PTSD  - Escitalopram 15 mg PO daily  - Hydroxyzine 5 mg PO Q4H PRN    # Cocaine use disorder, severe  # Polysubstance use disorder (amphetamine, cannabis, nicotine)   - Naltrexone 50 mg PO daily  - CD consult    -- PRN meds; acetaminophen, glucose **OR** dextrose **OR** glucagon, hydrOXYzine, magnesium hydroxide, nicotine, OLANZapine **OR** OLANZapine      Pertinent Medical diagnoses and management:    # Nocturia  # Sterile pyuria  - GC negative  - Quant Gold and urine culture pending  - IM Consulted- appreciate recommendations    # Insomnia  - Trazodone 100 mg PO at bedtime for sleep    # T2DM  - Insulin glargine (LANTUS PEN) 100 UNIT/ML, 15 Units Subcutaneous every 24 hours  - Liraglutide (VICTOZA) 18 MG/3ML solution, Inject 0.6 mg Subcutaneous daily (with dinner)  - Gabapentin (NEURONTIN) 400 MG for neuropathic pain   - Hypoglycemia protocol   - Glucose monitoring   - Start lisinopril for DM2 renal protection    # Vitamin D deficiency  - D3 50 mcg daily       Discontinued Medications (& Rationale):  - Fluoxetine 20 mg daily was D/Naveed 11/6 d/t inefficacy, replaced by escitalopram      Legal Status: Voluntary      Disposition/Anticipated Discharge Date: Awaiting placement for MICD treatment at [facility].      Safety Assessment:   Behavioral Orders   Procedures     Code 1 - Restrict to Unit     Routine Programming     As clinically indicated     Rule 25     Status 15     Every 15 minutes.     Suicide precautions     Patients on Suicide Precautions  should have a Combination Diet ordered that includes a Diet selection(s) AND a Behavioral Tray selection for Safe Tray - with utensils, or Safe Tray - NO utensils          Raheem Krause, MS3    I was present with the medical student who participated in the service and in the documentation of the note. I have verified the history and personally performed the physical exam and medical decision making. I agree with the assessment and plan of care as documented in the note.     Patient was discussed with my attending physician, Dr. Melissa Mckinley MD who is in agreement with my assessment and plan.    Resident Attestation:  Cheryl Ojeda MD  PGY1 Psychiatry Resident    Attestation:  This patient has been seen and evaluated by me, Melissa Mckinley MD.  I have discussed this patient with the house staff team including the resident and medical student and I agree with the findings and plan in this note.    I have reviewed today's vital signs, medications, labs and imaging. Melissa Mckinley MD , PhD.

## 2020-11-18 NOTE — PLAN OF CARE
Social in the milieu, active particip in groups. Full range affect, calm mood. Presents mostly appropriate. Ate meals. He is med compliant. Is looking forward to discharge tomorrow. Denies SI and SIB.Crystal Phillips RN

## 2020-11-18 NOTE — PLAN OF CARE
"Brody participated in Music Therapy group this evening.  Intervention offered was a \"Songwriting Brainstorm\" Sheet, and accompanying sheet with lines for verses, chorus and a bridge.  Goal of session was creative exploration of previously unsaid questions through music to build insight and self-expression.   The prompt was to write 4 questions to someone influential in your life, then to transpose that into song lyrics.  Brody wrote his song about his cousin, who is very influential to him.  He was more guarded than peers in his sharing, but appropriate and engaged throughout.  His affect was calm.        "

## 2020-11-18 NOTE — PROGRESS NOTES
Social in the milieu, active particip in groups. Full range affect, calm mood. Presents mostly appropriate. Ate meals.

## 2020-11-18 NOTE — PROGRESS NOTES
"Pt denied SI/SIB/HI. Pt denied all mental health symptoms. Pt spent most of shift in lounge, social with peers and staff. Pt was pleasant/cooperative for most of shift. Pt stated that he is experiencing middle night waking and is unable to go back to sleep until after breakfast. Pt stated that his appetite is \"good but I am sick of the food so I force myself to eat.\" No concerns or issues to report otherwise.   "

## 2020-11-19 VITALS
WEIGHT: 248.7 LBS | OXYGEN SATURATION: 100 % | HEART RATE: 77 BPM | BODY MASS INDEX: 32.96 KG/M2 | TEMPERATURE: 99 F | HEIGHT: 73 IN | DIASTOLIC BLOOD PRESSURE: 89 MMHG | SYSTOLIC BLOOD PRESSURE: 149 MMHG | RESPIRATION RATE: 16 BRPM

## 2020-11-19 LAB — GLUCOSE BLDC GLUCOMTR-MCNC: 90 MG/DL (ref 70–99)

## 2020-11-19 PROCEDURE — 250N000013 HC RX MED GY IP 250 OP 250 PS 637: Performed by: STUDENT IN AN ORGANIZED HEALTH CARE EDUCATION/TRAINING PROGRAM

## 2020-11-19 PROCEDURE — 99238 HOSP IP/OBS DSCHRG MGMT 30/<: CPT | Mod: GC | Performed by: PSYCHIATRY & NEUROLOGY

## 2020-11-19 PROCEDURE — 999N001017 HC STATISTIC GLUCOSE BY METER IP

## 2020-11-19 RX ADMIN — NALTREXONE HYDROCHLORIDE 50 MG: 50 TABLET, FILM COATED ORAL at 08:15

## 2020-11-19 RX ADMIN — LISINOPRIL 10 MG: 10 TABLET ORAL at 08:15

## 2020-11-19 RX ADMIN — Medication 50 MCG: at 08:15

## 2020-11-19 RX ADMIN — ESCITALOPRAM OXALATE 15 MG: 10 TABLET ORAL at 08:15

## 2020-11-19 RX ADMIN — GABAPENTIN 400 MG: 400 CAPSULE ORAL at 08:15

## 2020-11-19 ASSESSMENT — MIFFLIN-ST. JEOR: SCORE: 2001.98

## 2020-11-19 NOTE — PLAN OF CARE
Patient discharged to [facility]. Transport sent from treatment center. Discussed discharge instructions with Brody. Thoughts are clear and organized.All valuables returned. Meds sent with patient.  Rubi AMAYA and JORGE Phillips RN

## 2020-11-19 NOTE — PLAN OF CARE
Pt is very pleased to be leaving for treatment tomorrow . His main concern is that he does not have any clothes here and will be in a 30 day treatment. He plans to discharge after 10 am . Pleasant , calm . Social , denies si

## 2020-11-19 NOTE — PLAN OF CARE
Pt actively participated in occupational therapy clinic. Pt was able to ask for assistance as needed, and independently initiate a familiar, creative expression task with minimal assistance for task setup. Pt demonstrated good focus and planning, with occasional assistance to problem solve during task completion. Pt appeared comfortable interacting with peers and writer, and verbalized feeling proud of his work. He was pleasant and engaged with a congruent affect.

## 2020-11-19 NOTE — PLAN OF CARE
Work Completed  - spoke with unit - confirmed that the treatment program did call them this morning reporting  time  - chart review  - team meeting  - team rounds/pt interview via Zoom  - patient reports feeling ready and good about plan to discharge to residential MICD program today.   - post team rounds team meeting / discussion    Discharge plan or goal  Discharge to residential MICD treatment today - [facility] - will follow up with psychiatry and medical providers on site, staff there will assist in scheduling for providers post completion of treatment.     Barriers to discharge  None - will be picked up for direct admission to residential treatment

## 2020-11-19 NOTE — DISCHARGE SUMMARY
"  [facility], [place]   Discharge Summary  Hospital Day #14  Brody Colindres MRN# [id number]   Age: 60 year old YOB: 1960   Date of Admission:  11/4/2020  Date of Discharge:  11/19/2020  Admitting Physician:  Melissa Mckinley MD  Discharge Physician:  Melissa Mckinley MD     Event Leading to Hospitalization:     Brdoy Colindres is a 60 year old male with a documented history of depression, biopolar disorder, schizophrenia, anxiety, polysubstance use disorder, severe (mainly cocaine), PTSD and diabetes mellitus with neuropathy who presents to the [place] [facility] on 11/5/2020 for evaluation of suicidal ideation in the context of stressors including recent move, unstable housing, unemployment, and substance use (cocaine).     See Admission note by Melissa Mckinley MD on 11/4/2020 for additional details.      Objective:   B/P: 132/82, T: 98.1, P: 78, R: 16  Psychiatric Examination:  Appearance:  awake, alert and dressed in hospital scrubs  Muscle Strength and Tone: not formally assessed. appears grossly normal  Gait and Station: not assessed  Behavior (Psychomotor):  no evidence of tardive dyskinesia, dystonia, or tics  Eye Contact:  fair  Speech:  clear, coherent, normal prosody and soft in volume  Mood:  \"good\"  Affect:  intensity is blunted  Attitude:  cooperative  Thought Process:  logical, linear and goal oriented  Thought Content:  no evidence of suicidal ideation or homicidal ideation and no evidence of psychotic thought  Associations:  no loose associations  Insight:  fair  Judgment:  fair  Oriented to:  time, person, and place  Attention Span and Concentration:  intact  Recent and Remote Memory:  intact  Language: Fluent in English with appropriate syntax and vocabulary.  Fund of Knowledge: appropriate     Hospital Course:   Diagnostic Impression:  Brody Colindres is a 60 year old male with a documented history of depression, bipolar " disorder, schizophrenia, anxiety, polysubstance use disorder, severe (mainly cocaine), PTSD and T2DM with neuropathy who presented to the [place] [facility] on 11/5/2020 for evaluation of suicidal ideation in the context of stressors including recent move, unstable housing, unemployment, and substance use (cocaine). Substance use is likely a contributing factor to their presentation. Psychosocial factors contributing to current presentation include loss of loved ones (son murdered, another son is in death row), trauma while incarcerated, unemployment and unstable housing.      The patient's definitive diagnosis still evolving and includes major depressive disorder vs PTSD vs adjustment disorder. Patient denies current or past history of artem or psychosis, so current differential accounts for SI and depressive symptoms. Does not state a history of artem or psychosis outside of substance use.     Psychiatric Course:  Brody Colindres was admitted to station 22 with attending Melissa Mckinley MD as a voluntary patient, and was treated in the therapeutic milieu with appropriate individual and group therapies. On admission, he was noted as tense, anxious, and guarded. PTA daily fluoxetine 20 mg PO and naltrexone 50 mg PO were continued for depression and substance use disorders.    On hospital day 1, Brody stated that he was happy to be in the hospital but endorsed SI, severe depressive symptoms, and appeared tense and anxious with a restricted/blunted affect. He felt his current medications were not working for him. Escitalopram 10 mg PO QD was started, and fluoxetine was discontinued d/t inefficacy and to minimize polypharmacy. Hydroxyzine 25 mg Q4H PRN was started for anxiety. Brody also endorsed interest in entering chemical dependency treatment.    On 11/10/2020, pt reported increased difficulty with sleep both falling asleep and staying asleep. Unchanged thoughts of suicide, still with no plan. Was  increasingly conflicted about possible paranoia/hallucinations with respect to individuals he claimed were pursuing him. Escitalopram was increased to 15 mg and he was started trazodone 100 mg at bedtime for sleep.    11/11/2020, pt reported slight improvement in sleep and mood. Denied suicidal ideation to staff and mental health team.       Medical Course:  Patient was medically cleared for admission to the unit.    11/16/2020, pt reported increased nocturia. Denied symptoms of UTI (dysuria, fevers, chills). Planned to obtain urinary labs and screen for SIADH.  11/17/2020, pt reported continued nocturia unchanged from previous. Discussed UA findings consistent with sterile pyuria and possible etiologies. Obtained urine GC and Quant Gold. IM consult placed.  11/18/2020, pt continued to report nocturia and polyuria. IM consulted, suggested that urine may have contaminate, agreed with STD screen. G/C negative, Quant Gold and urine culture unresulted.     Consults:   - IM consulted 11/17/2020:  Agreed with GC and chlamydia urine PCR.  Obtained urine culture.  No further medical intervention indicated.    Risk Assessment:   Brody Colindres has notable risk factors for self-harm, including age, single status, substance abuse and previous suicide attempts. However, risk is mitigated by sobriety, ability to volunteer a safety plan and history of seeking help when needed. Additional steps taken to minimize risk include: discharge to residential treatment facility. All modifiable factors have been addressed. Therefore, based on all available evidence including the factors cited above, Brody Colindres does not appear to be an imminent danger to self or others and is appropriate for outpatient level of care. However, if patient uses substances or is non-adherent with medication, their risk of decompensation and SI/HI will be elevated. This was discussed with the patient.    This document serves as a transfer of care to  Brody Colindres's outpatient providers.     Diagnoses:   Primary psychiatric diagnoses:   # Depressive disorder, unspecified (major depressive disorder vs PTSD vs adjustment disorder)     Secondary psychiatric diagnoses of concern this admission:   # PTSD  # Anxiety  # Cocaine use disorder, severe  # Polysubstance use disorder (amphetamine, cannabis, nicotine)      Discharge Plan:   Patient is being discharged to [facility] with the following medications and appointments as detailed below:    Medications:  Added/Changed:  - Escitalopram 15 mg PO daily  - Hydroxyzine 5 mg PO Q4H PRN  - Trazodone 100 mg PO at bedtime  - Gabapentin (NEURONTIN) 400 MG for neuropathic pain   - Lisinoril 10 mg PO daily    Continued:  - Naltrexone 50 mg PO daily  - Insulin glargine (LANTUS PEN) 100 UNIT/ML, 15 Units Subcutaneous every 24 hours  - Liraglutide (VICTOZA) 18 MG/3ML solution, Inject 0.6 mg Subcutaneous daily (with dinner)  - D3 50 mcg daily     Discontinued:  - Fluoxetine 20 mg daily    Psychiatric/Psychotherapy Appointments:   [facility] ([facility]) - [facility]  [address] [place] [address] Phone: [phone] Fax: [phone]    Pt seen and discussed with my attending, MD Cheryl Noel MD  PGY-1 Psychiatry Resident    Attestation:   The patient has been seen and evaluated by me,  Melissa Mckinley MD. I have examined the patient today and reviewed the discharge plan with the resident and medical student. I agree with the final assessment and plan, as noted in the discharge summary. I have reviewed today's vital signs, medications, labs and imaging.  Total time discharge plannin minutes  Melissa Mckinley MD ,Ph.D.         Appendix A: All Labs This Admission:     Results for orders placed or performed during the hospital encounter of 20   Drug abuse screen 6 urine (tox)     Status: Abnormal   Result Value Ref Range    Amphetamine Qual  Urine Positive (A) NEG^Negative    Barbiturates Qual Urine Negative NEG^Negative    Benzodiazepine Qual Urine Negative NEG^Negative    Cannabinoids Qual Urine Positive (A) NEG^Negative    Cocaine Qual Urine Positive (A) NEG^Negative    Ethanol Qual Urine Negative NEG^Negative    Opiates Qualitative Urine Negative NEG^Negative   Asymptomatic COVID-19 Virus (Coronavirus) by PCR     Status: None    Specimen: Nasopharyngeal   Result Value Ref Range    COVID-19 Virus PCR to [facility] - Source Nasopharyngeal     COVID-19 Virus PCR to [facility] - Result       Test received-See reflex to IDDL test SARS CoV2 (COVID-19) Virus RT-PCR   Glucose by meter     Status: Abnormal   Result Value Ref Range    Glucose 169 (H) 70 - 99 mg/dL   CBC with platelets differential     Status: Abnormal   Result Value Ref Range    WBC 5.3 4.0 - 11.0 10e9/L    RBC Count 4.35 (L) 4.4 - 5.9 10e12/L    Hemoglobin 13.0 (L) 13.3 - 17.7 g/dL    Hematocrit 38.0 (L) 40.0 - 53.0 %    MCV 87 78 - 100 fl    MCH 29.9 26.5 - 33.0 pg    MCHC 34.2 31.5 - 36.5 g/dL    RDW 12.9 10.0 - 15.0 %    Platelet Count 200 150 - 450 10e9/L    Diff Method Automated Method     % Neutrophils 39.3 %    % Lymphocytes 46.9 %    % Monocytes 8.0 %    % Eosinophils 4.6 %    % Basophils 1.0 %    % Immature Granulocytes 0.2 %    Nucleated RBCs 0 0 /100    Absolute Neutrophil 2.1 1.6 - 8.3 10e9/L    Absolute Lymphocytes 2.5 0.8 - 5.3 10e9/L    Absolute Monocytes 0.4 0.0 - 1.3 10e9/L    Absolute Eosinophils 0.2 0.0 - 0.7 10e9/L    Absolute Basophils 0.1 0.0 - 0.2 10e9/L    Abs Immature Granulocytes 0.0 0 - 0.4 10e9/L    Absolute Nucleated RBC 0.0    Comprehensive metabolic panel     Status: Abnormal   Result Value Ref Range    Sodium 140 133 - 144 mmol/L    Potassium 4.0 3.4 - 5.3 mmol/L    Chloride 110 (H) 94 - 109 mmol/L    Carbon Dioxide 28 20 - 32 mmol/L    Anion Gap 2 (L) 3 - 14 mmol/L    Glucose 126 (H) 70 - 99 mg/dL    Urea Nitrogen 7 7 - 30 mg/dL    Creatinine 0.85 0.66 - 1.25 mg/dL     GFR Estimate >90 >60 mL/min/[1.73_m2]    GFR Estimate If Black >90 >60 mL/min/[1.73_m2]    Calcium 8.5 8.5 - 10.1 mg/dL    Bilirubin Total 0.7 0.2 - 1.3 mg/dL    Albumin 3.0 (L) 3.4 - 5.0 g/dL    Protein Total 6.9 6.8 - 8.8 g/dL    Alkaline Phosphatase 54 40 - 150 U/L    ALT 17 0 - 70 U/L    AST 13 0 - 45 U/L   TSH with free T4 reflex and/or T3 as indicated     Status: Abnormal   Result Value Ref Range    TSH 0.14 (L) 0.40 - 4.00 mU/L   Vitamin D     Status: Abnormal   Result Value Ref Range    Vitamin D Deficiency screening 14 (L) 20 - 75 ug/L   SARS-CoV-2 COVID-19 Virus (Coronavirus) RT-PCR Nasopharyngeal     Status: None    Specimen: Nasopharyngeal   Result Value Ref Range    SARS-CoV-2 Virus Specimen Source Nasopharyngeal     SARS-CoV-2 PCR Result NEGATIVE     SARS-CoV-2 PCR Comment       Testing was performed using the Cued SARS-CoV-2 Assay on the [facility].   Additional information about this Emergency Use Authorization (EUA) assay can be found via   the Lab Guide.     Glucose by meter     Status: Abnormal   Result Value Ref Range    Glucose 127 (H) 70 - 99 mg/dL   T4 free     Status: Abnormal   Result Value Ref Range    T4 Free 0.72 (L) 0.76 - 1.46 ng/dL   Glucose by meter     Status: Abnormal   Result Value Ref Range    Glucose 286 (H) 70 - 99 mg/dL   Glucose by meter     Status: None   Result Value Ref Range    Glucose 92 70 - 99 mg/dL   Glucose by meter     Status: Abnormal   Result Value Ref Range    Glucose 137 (H) 70 - 99 mg/dL   Glucose by meter     Status: None   Result Value Ref Range    Glucose 88 70 - 99 mg/dL   Glucose by meter     Status: Abnormal   Result Value Ref Range    Glucose 158 (H) 70 - 99 mg/dL   Glucose by meter     Status: None   Result Value Ref Range    Glucose 98 70 - 99 mg/dL   Glucose by meter     Status: None   Result Value Ref Range    Glucose 84 70 - 99 mg/dL   Glucose by meter     Status: None   Result Value Ref Range    Glucose 85 70 - 99 mg/dL   Glucose  by meter     Status: Abnormal   Result Value Ref Range    Glucose 178 (H) 70 - 99 mg/dL   Glucose by meter     Status: None   Result Value Ref Range    Glucose 92 70 - 99 mg/dL   Glucose by meter     Status: Abnormal   Result Value Ref Range    Glucose 108 (H) 70 - 99 mg/dL   Glucose by meter     Status: None   Result Value Ref Range    Glucose 93 70 - 99 mg/dL   Glucose by meter     Status: Abnormal   Result Value Ref Range    Glucose 192 (H) 70 - 99 mg/dL   Glucose by meter     Status: None   Result Value Ref Range    Glucose 81 70 - 99 mg/dL   Glucose by meter     Status: Abnormal   Result Value Ref Range    Glucose 216 (H) 70 - 99 mg/dL   Comprehensive metabolic panel     Status: Abnormal   Result Value Ref Range    Sodium 141 133 - 144 mmol/L    Potassium 4.1 3.4 - 5.3 mmol/L    Chloride 108 94 - 109 mmol/L    Carbon Dioxide 29 20 - 32 mmol/L    Anion Gap 4 3 - 14 mmol/L    Glucose 88 70 - 99 mg/dL    Urea Nitrogen 9 7 - 30 mg/dL    Creatinine 0.90 0.66 - 1.25 mg/dL    GFR Estimate >90 >60 mL/min/[1.73_m2]    GFR Estimate If Black >90 >60 mL/min/[1.73_m2]    Calcium 8.5 8.5 - 10.1 mg/dL    Bilirubin Total 0.5 0.2 - 1.3 mg/dL    Albumin 3.0 (L) 3.4 - 5.0 g/dL    Protein Total 7.0 6.8 - 8.8 g/dL    Alkaline Phosphatase 62 40 - 150 U/L    ALT 21 0 - 70 U/L    AST 14 0 - 45 U/L   UA with Microscopic     Status: Abnormal   Result Value Ref Range    Color Urine Yellow     Appearance Urine Clear     Glucose Urine Negative NEG^Negative mg/dL    Bilirubin Urine Negative NEG^Negative    Ketones Urine Negative NEG^Negative mg/dL    Specific Gravity Urine 1.019 1.003 - 1.035    Blood Urine Negative NEG^Negative    pH Urine 5.5 5.0 - 7.0 pH    Protein Albumin Urine Negative NEG^Negative mg/dL    Urobilinogen mg/dL Normal 0.0 - 2.0 mg/dL    Nitrite Urine Negative NEG^Negative    Leukocyte Esterase Urine Moderate (A) NEG^Negative    Source Urine     WBC Urine 26 (H) 0 - 5 /HPF    RBC Urine 1 0 - 2 /HPF    Squamous Epithelial  /HPF Urine <1 0 - 1 /HPF    Mucous Urine Present (A) NEG^Negative /LPF   Osmolality urine     Status: None   Result Value Ref Range    Urine Osmolality 721 100 - 1,200 mmol/kg   Glucose by meter     Status: Abnormal   Result Value Ref Range    Glucose 206 (H) 70 - 99 mg/dL   Glucose by meter     Status: None   Result Value Ref Range    Glucose 92 70 - 99 mg/dL   Glucose by meter     Status: Abnormal   Result Value Ref Range    Glucose 209 (H) 70 - 99 mg/dL   Glucose by meter     Status: Abnormal   Result Value Ref Range    Glucose 185 (H) 70 - 99 mg/dL   EKG 12-lead, tracing only     Status: None   Result Value Ref Range    Interpretation ECG Click View Image link to view waveform and result    Chemical Dependency IP Consult     Status: None ()    Ashley Silveira     11/17/2020 11:40 AM  11/17/2020    CD update completed.   Pt wants referrals outside of the [facility]  Will send to [facility] for review of [facility], [place] and [place].   Pt also asked about a [facility] based program. I called [facility] in   [place] to see if it is residential programming. They reported   they provide both in [place]. Will also send referral there.     [facility] [place]  [address], [place] [address]  Phone: [phone]  Fax: [phone]    [place] [facility]  Phone: [phone]  Fax: [phone]    [place]   [address] [place] [address]  Phone: [phone]  Fax [phone]    [facility]  [address], [place] [address]  Phone: [phone]  fax: [phone]    [facility]  [address] [place] [address]  Tel: [phone]  Fax: [phone]    WES Goldman  [email or web address]  [phone]     Neisseria gonorrhoea PCR     Status: None    Specimen: Urine   Result Value Ref Range    Specimen Descrip Urine     N Gonorrhea PCR Negative NEG^Negative   Chlamydia  trachomatis PCR     Status: None    Specimen: Urine   Result Value Ref Range    Specimen Description Urine     Chlamydia Trachomatis PCR Negative NEG^Negative   HSV 1 and 2 DNA by PCR     Status: None   Result Value Ref Range    HSV Specimen Type EDTA PLASMA     HSV Type 1 PCR Negative NEG^Negative    HSV Type 2 PCR Negative NEG^Negative   Urine Culture Aerobic Bacterial     Status: None    Specimen: Urine Midstream; Midstream Urine   Result Value Ref Range    Specimen Description Midstream Urine     Special Requests Specimen received in preservative     Culture Micro No growth

## 2020-11-19 NOTE — DISCHARGE INSTRUCTIONS
Behavioral Discharge Planning and Instructions      Summary:  You were admitted on 11/4/2020  due to Suicidal Ideations.  You were treated by Dr. Melissa Mckinley MD and discharged on 11/19/2020 from [facility] to [facility] ([facility])    Primary psychiatric diagnoses:   Depressive disorder, unspecified (manic depressive disorder vs PTSD vs adjustment disorder)     Secondary psychiatric diagnoses of concern this admission:   PTSD  Anxiety  Cocaine use disorder, severe  Polysubstance use disorder (amphetamine, cannabis, nicotine)       Health Care Follow-up Appointments:     [facility] ([facility]) - [facility]  [address] [place] [address] Phone: [phone] Fax: [phone]    Attend all scheduled appointments with your outpatient providers. Call at least 24 hours in advance if you need to reschedule an appointment to ensure continued access to your outpatient providers.   Major Treatments, Procedures and Findings:  You were provided with: a psychiatric assessment, assessed for medical stability, medication evaluation and/or management, group therapy, CD evaluation/assessment and milieu management    Symptoms to Report: feeling more aggressive, increased confusion, losing more sleep, mood getting worse or thoughts of suicide    Early warning signs can include: increased depression or anxiety sleep disturbances increased thoughts or behaviors of suicide or self-harm  increased unusual thinking, such as paranoia or hearing voices    Safety and Wellness:  Take all medicines as directed.  Make no changes unless your doctor suggests them.      Follow treatment recommendations.  Refrain from alcohol and non-prescribed drugs.     Resources:   Crisis Intervention: [phone] or [phone] (TTY: [phone]).  Call anytime for help.  National [place] on Mental Illness (www.mn.isamar.org): 821.637.9146 or  History  Chief Complaint   Patient presents with    Knee Pain     pt with bilateral knee pain since last night, no injury to area       History provided by:  Patient   used: No    Knee Pain   Location:  Knee  Injury: no    Knee location:  R knee and L knee  Pain details:     Quality:  Aching    Radiates to:  Does not radiate    Severity:  Moderate    Onset quality:  Gradual    Timing:  Intermittent    Progression:  Waxing and waning  Chronicity:  New  Dislocation: no    Prior injury to area:  No  Relieved by:  Nothing  Worsened by:  Bearing weight  Ineffective treatments:  None tried  Associated symptoms: no back pain, no fatigue, no fever, no muscle weakness, no neck pain, no numbness and no tingling        None       History reviewed  No pertinent past medical history  History reviewed  No pertinent surgical history  History reviewed  No pertinent family history  I have reviewed and agree with the history as documented  Social History   Substance Use Topics    Smoking status: Never Smoker    Smokeless tobacco: Never Used    Alcohol use No        Review of Systems   Constitutional: Negative for activity change, appetite change, fatigue, fever and unexpected weight change  HENT: Negative for sore throat and voice change  Eyes: Negative for pain and visual disturbance  Respiratory: Negative for cough, chest tightness and shortness of breath  Cardiovascular: Negative for chest pain  Gastrointestinal: Negative for abdominal pain, diarrhea, nausea and vomiting  Endocrine: Negative for polyphagia and polyuria  Genitourinary: Negative for difficulty urinating, dysuria, flank pain, frequency and urgency  Musculoskeletal: Negative for back pain, gait problem, neck pain and neck stiffness  Skin: Negative for color change and rash  Allergic/Immunologic: Negative for immunocompromised state     Neurological: Negative for dizziness, syncope, speech difficulty, "[phone].  Alcoholics Anonymous (www.alcoholics-anonymous.org): Check your phone book for your local chapter.  Suicide Awareness Voices of Education (SAVE) (www.save.org): 421-725-SAVE (7237)  [facility] Crisis (COPE) Response - Adult [phone]  Text 4 Life: txt \"LIFE\" to 34581 for immediate support and crisis intervention  Crisis text line: Text \"[place]\" to 547429. Free, confidential, 24/7.  [facility] Acute Psychiatry Services (APS)   [address] [place] [address] ph:[phone]      24-hour walk-in crisis intervention and treatment of behavioral emergencies    Suicide Hotline, giving suicidal callers hope through crisis counseling    Crisis intervention phone service for assessment, information, and referral for psychiatric emergencies.    Treatment of psychiatric emergencies such as acute psychotic conditions, panic states, severe and incapacitating depression, suicidal crisis, danger to others, sudden loss of memory, and situations involving grave mental disability.    Community consultation and education on crisis intervention      The treatment team has appreciated the opportunity to work with you.     If you have any questions or concerns our unit number is [phone]  " light-headedness, numbness and headaches  Hematological: Does not bruise/bleed easily  Psychiatric/Behavioral: Negative for confusion and decreased concentration  Physical Exam  Physical Exam   Constitutional: She is oriented to person, place, and time  She appears well-developed and well-nourished  HENT:   Head: Normocephalic and atraumatic  Eyes: Conjunctivae and EOM are normal  Pupils are equal, round, and reactive to light  No scleral icterus  Neck: Normal range of motion  Neck supple  No JVD present  No tracheal deviation present  Cardiovascular: Normal rate and regular rhythm  Pulmonary/Chest: Effort normal and breath sounds normal  No respiratory distress  Abdominal: Soft  She exhibits no distension  There is no tenderness  Musculoskeletal: Normal range of motion  She exhibits no tenderness or deformity  BL lower ext with normal active and passive ROM, no deformity, warmth, or overlying skin changes  normal NV exam     Lymphadenopathy:     She has no cervical adenopathy  Neurological: She is alert and oriented to person, place, and time  No gross focal sensory or motor deficits   Skin: Skin is warm and dry  No rash noted  She is not diaphoretic  Psychiatric: She has a normal mood and affect  Vitals reviewed        Vital Signs  ED Triage Vitals [07/26/18 2219]   Temperature Pulse Respirations Blood Pressure SpO2   99 1 °F (37 3 °C) (!) 110 16 139/62 99 %      Temp Source Heart Rate Source Patient Position - Orthostatic VS BP Location FiO2 (%)   Oral Monitor Sitting Left arm --      Pain Score       8           Vitals:    07/26/18 2219 07/27/18 0201   BP: 139/62 127/64   Pulse: (!) 110 100   Patient Position - Orthostatic VS: Sitting        Visual Acuity      ED Medications  Medications   ketorolac (TORADOL) injection 30 mg (30 mg Intramuscular Given 7/27/18 0202)       Diagnostic Studies  Results Reviewed     Procedure Component Value Units Date/Time    POCT pregnancy, urine [[id number]]  (Normal) Resulted:  07/27/18 0037    Lab Status:  Final result Updated:  07/27/18 0037     EXT PREG TEST UR (Ref: Negative) negative                 XR knee 4+ views RIGHT   Final Result by Anca Lyon MD (07/27 4051)      No acute osseous abnormality  Workstation performed: [id number]         XR knee 4+ views LEFT   Final Result by Anca Lyon MD (07/27 9238)      No acute osseous abnormality  Workstation performed: [id number]                    Procedures  Procedures       Phone Contacts  ED Phone Contact    ED Course                               MDM  Number of Diagnoses or Management Options  Acute bilateral knee pain: new and requires workup  Diagnosis management comments: 28 yo F c/o atraumatic bl knee pain intermittently for several days/weeks, now worse past 24-48 hours  Denies injury or other complaint  No fevers or recent illnesses, no nvd  No localized n/w/t  Plain radiographs neg  Will d/c to f/u with ortho/pcp  Amount and/or Complexity of Data Reviewed  Tests in the radiology section of CPT®: reviewed and ordered  Review and summarize past medical records: yes  Independent visualization of images, tracings, or specimens: yes      CritCare Time    Disposition  Final diagnoses:   Acute bilateral knee pain     Time reflects when diagnosis was documented in both MDM as applicable and the Disposition within this note     Time User Action Codes Description Comment    7/27/2018  1:19 AM Tammy Tiwari Add [X49 524,  V66 562] Acute bilateral knee pain       ED Disposition     ED Disposition Condition Comment    Discharge  [facility] discharge to home/self care  Condition at discharge: Good        Follow-up Information     Follow up With Specialties Details [address], DO Sports Medicine  Please call to arrange for follow up in 3-5 days if your symptoms do not improve   If you have new or worsening symptoms please call your doctor or return to the emergency department  [address]  [phone]            There are no discharge medications for this patient  No discharge procedures on file      ED Provider  Electronically Signed by           Levar Torres MD  08/27/18 0007

## 2020-11-21 LAB
GAMMA INTERFERON BACKGROUND BLD IA-ACNC: 0.12 IU/ML
M TB IFN-G CD4+ BCKGRND COR BLD-ACNC: 9.88 IU/ML
M TB TUBERC IFN-G BLD QL: POSITIVE
MITOGEN IGNF BCKGRD COR BLD-ACNC: 7.55 IU/ML
MITOGEN IGNF BCKGRD COR BLD-ACNC: 9.88 IU/ML

## 2021-05-05 ENCOUNTER — TRANSFERRED RECORDS (OUTPATIENT)
Dept: HEALTH INFORMATION MANAGEMENT | Facility: CLINIC | Age: 61
End: 2021-05-05

## 2021-05-05 LAB
CREAT SERPL-MCNC: 1.17 MG/DL (ref 0.74–1.35)
GFR SERPL CREATININE-BSD FRML MDRD: 68 ML/MIN/BSA
GLUCOSE SERPL-MCNC: 208 MG/DL (ref 70–140)
POTASSIUM SERPL-SCNC: 4.5 MMOL/L (ref 3.6–5.2)
TSH SERPL-ACNC: 1.1 MIU/L (ref 0.3–4.2)

## 2021-05-07 ENCOUNTER — HOSPITAL ENCOUNTER (INPATIENT)
Facility: CLINIC | Age: 61
LOS: 17 days | Discharge: PSYCHIATRIC OR SUBSTANCE USE FACILITY | End: 2021-05-24
Attending: PSYCHIATRY & NEUROLOGY | Admitting: PSYCHIATRY & NEUROLOGY
Payer: COMMERCIAL

## 2021-05-07 DIAGNOSIS — F33.9 RECURRENT MAJOR DEPRESSIVE DISORDER, REMISSION STATUS UNSPECIFIED (H): ICD-10-CM

## 2021-05-07 DIAGNOSIS — E11.40 TYPE 2 DIABETES MELLITUS WITH DIABETIC NEUROPATHY, UNSPECIFIED WHETHER LONG TERM INSULIN USE (H): ICD-10-CM

## 2021-05-07 DIAGNOSIS — I10 BENIGN ESSENTIAL HYPERTENSION: ICD-10-CM

## 2021-05-07 DIAGNOSIS — F29 PSYCHOSIS, UNSPECIFIED PSYCHOSIS TYPE (H): ICD-10-CM

## 2021-05-07 DIAGNOSIS — R76.12 POSITIVE QUANTIFERON-TB GOLD TEST: Primary | ICD-10-CM

## 2021-05-07 LAB
ALBUMIN SERPL-MCNC: 3.5 G/DL (ref 3.4–5)
ALBUMIN UR-MCNC: 10 MG/DL
ALP SERPL-CCNC: 66 U/L (ref 40–150)
ALT SERPL W P-5'-P-CCNC: 24 U/L (ref 0–70)
ANION GAP SERPL CALCULATED.3IONS-SCNC: 6 MMOL/L (ref 3–14)
APPEARANCE UR: CLEAR
AST SERPL W P-5'-P-CCNC: 20 U/L (ref 0–45)
BACTERIA #/AREA URNS HPF: ABNORMAL /HPF
BASOPHILS # BLD AUTO: 0 10E9/L (ref 0–0.2)
BASOPHILS NFR BLD AUTO: 0.5 %
BILIRUB SERPL-MCNC: 0.4 MG/DL (ref 0.2–1.3)
BILIRUB UR QL STRIP: NEGATIVE
BUN SERPL-MCNC: 11 MG/DL (ref 7–30)
CALCIUM SERPL-MCNC: 9 MG/DL (ref 8.5–10.1)
CHLORIDE SERPL-SCNC: 104 MMOL/L (ref 94–109)
CO2 SERPL-SCNC: 26 MMOL/L (ref 20–32)
COLOR UR AUTO: YELLOW
CREAT SERPL-MCNC: 1.02 MG/DL (ref 0.66–1.25)
DIFFERENTIAL METHOD BLD: NORMAL
EOSINOPHIL # BLD AUTO: 0.1 10E9/L (ref 0–0.7)
EOSINOPHIL NFR BLD AUTO: 0.8 %
ERYTHROCYTE [DISTWIDTH] IN BLOOD BY AUTOMATED COUNT: 13.2 % (ref 10–15)
GFR SERPL CREATININE-BSD FRML MDRD: 80 ML/MIN/{1.73_M2}
GLUCOSE BLDC GLUCOMTR-MCNC: 187 MG/DL (ref 70–99)
GLUCOSE BLDC GLUCOMTR-MCNC: 192 MG/DL (ref 70–99)
GLUCOSE SERPL-MCNC: 234 MG/DL (ref 70–99)
GLUCOSE UR STRIP-MCNC: 1000 MG/DL
HBA1C MFR BLD: 8.3 % (ref 0–5.6)
HCT VFR BLD AUTO: 40.3 % (ref 40–53)
HGB BLD-MCNC: 13.6 G/DL (ref 13.3–17.7)
HGB UR QL STRIP: NEGATIVE
HYALINE CASTS #/AREA URNS LPF: 11 /LPF (ref 0–2)
IMM GRANULOCYTES # BLD: 0 10E9/L (ref 0–0.4)
IMM GRANULOCYTES NFR BLD: 0.4 %
KETONES UR STRIP-MCNC: 20 MG/DL
LACTATE BLD-SCNC: 1.7 MMOL/L (ref 0.7–2)
LEUKOCYTE ESTERASE UR QL STRIP: NEGATIVE
LIPASE SERPL-CCNC: 242 U/L (ref 73–393)
LYMPHOCYTES # BLD AUTO: 1.7 10E9/L (ref 0.8–5.3)
LYMPHOCYTES NFR BLD AUTO: 22.5 %
MAGNESIUM SERPL-MCNC: 2.2 MG/DL (ref 1.6–2.3)
MCH RBC QN AUTO: 30.2 PG (ref 26.5–33)
MCHC RBC AUTO-ENTMCNC: 33.7 G/DL (ref 31.5–36.5)
MCV RBC AUTO: 89 FL (ref 78–100)
MONOCYTES # BLD AUTO: 0.5 10E9/L (ref 0–1.3)
MONOCYTES NFR BLD AUTO: 6.4 %
MUCOUS THREADS #/AREA URNS LPF: PRESENT /LPF
NEUTROPHILS # BLD AUTO: 5.2 10E9/L (ref 1.6–8.3)
NEUTROPHILS NFR BLD AUTO: 69.4 %
NITRATE UR QL: NEGATIVE
NRBC # BLD AUTO: 0 10*3/UL
NRBC BLD AUTO-RTO: 0 /100
PH UR STRIP: 5 PH (ref 5–7)
PHOSPHATE SERPL-MCNC: 3.5 MG/DL (ref 2.5–4.5)
PLATELET # BLD AUTO: 219 10E9/L (ref 150–450)
POTASSIUM SERPL-SCNC: 4.2 MMOL/L (ref 3.4–5.3)
PROT SERPL-MCNC: 8.1 G/DL (ref 6.8–8.8)
RBC # BLD AUTO: 4.51 10E12/L (ref 4.4–5.9)
RBC #/AREA URNS AUTO: 2 /HPF (ref 0–2)
SODIUM SERPL-SCNC: 136 MMOL/L (ref 133–144)
SOURCE: ABNORMAL
SP GR UR STRIP: 1.03 (ref 1–1.03)
SQUAMOUS #/AREA URNS AUTO: <1 /HPF (ref 0–1)
UROBILINOGEN UR STRIP-MCNC: NORMAL MG/DL (ref 0–2)
WBC # BLD AUTO: 7.5 10E9/L (ref 4–11)
WBC #/AREA URNS AUTO: <1 /HPF (ref 0–5)

## 2021-05-07 PROCEDURE — 250N000013 HC RX MED GY IP 250 OP 250 PS 637: Performed by: INTERNAL MEDICINE

## 2021-05-07 PROCEDURE — 124N000002 HC R&B MH UMMC

## 2021-05-07 PROCEDURE — 87389 HIV-1 AG W/HIV-1&-2 AB AG IA: CPT | Performed by: PSYCHIATRY & NEUROLOGY

## 2021-05-07 PROCEDURE — 250N000013 HC RX MED GY IP 250 OP 250 PS 637: Performed by: PSYCHIATRY & NEUROLOGY

## 2021-05-07 PROCEDURE — 83036 HEMOGLOBIN GLYCOSYLATED A1C: CPT | Performed by: PHYSICIAN ASSISTANT

## 2021-05-07 PROCEDURE — 999N001017 HC STATISTIC GLUCOSE BY METER IP

## 2021-05-07 PROCEDURE — 84100 ASSAY OF PHOSPHORUS: CPT | Performed by: PSYCHIATRY & NEUROLOGY

## 2021-05-07 PROCEDURE — 85025 COMPLETE CBC W/AUTO DIFF WBC: CPT | Performed by: PSYCHIATRY & NEUROLOGY

## 2021-05-07 PROCEDURE — 99221 1ST HOSP IP/OBS SF/LOW 40: CPT | Performed by: PHYSICIAN ASSISTANT

## 2021-05-07 PROCEDURE — 80307 DRUG TEST PRSMV CHEM ANLYZR: CPT | Performed by: PSYCHIATRY & NEUROLOGY

## 2021-05-07 PROCEDURE — 36415 COLL VENOUS BLD VENIPUNCTURE: CPT | Performed by: PSYCHIATRY & NEUROLOGY

## 2021-05-07 PROCEDURE — 36415 COLL VENOUS BLD VENIPUNCTURE: CPT | Performed by: PHYSICIAN ASSISTANT

## 2021-05-07 PROCEDURE — 250N000011 HC RX IP 250 OP 636: Performed by: PSYCHIATRY & NEUROLOGY

## 2021-05-07 PROCEDURE — 250N000009 HC RX 250: Performed by: INTERNAL MEDICINE

## 2021-05-07 PROCEDURE — 83690 ASSAY OF LIPASE: CPT | Performed by: PSYCHIATRY & NEUROLOGY

## 2021-05-07 PROCEDURE — 99207 PR CONSULT E&M CHANGED TO INITIAL LEVEL: CPT | Performed by: PHYSICIAN ASSISTANT

## 2021-05-07 PROCEDURE — 83735 ASSAY OF MAGNESIUM: CPT | Performed by: PSYCHIATRY & NEUROLOGY

## 2021-05-07 PROCEDURE — 80053 COMPREHEN METABOLIC PANEL: CPT | Performed by: PSYCHIATRY & NEUROLOGY

## 2021-05-07 PROCEDURE — 83605 ASSAY OF LACTIC ACID: CPT | Performed by: PSYCHIATRY & NEUROLOGY

## 2021-05-07 PROCEDURE — 81001 URINALYSIS AUTO W/SCOPE: CPT | Performed by: PHYSICIAN ASSISTANT

## 2021-05-07 PROCEDURE — 99223 1ST HOSP IP/OBS HIGH 75: CPT | Mod: AI | Performed by: PSYCHIATRY & NEUROLOGY

## 2021-05-07 RX ORDER — MAGNESIUM HYDROXIDE/ALUMINUM HYDROXICE/SIMETHICONE 120; 1200; 1200 MG/30ML; MG/30ML; MG/30ML
30 SUSPENSION ORAL EVERY 4 HOURS PRN
Status: DISCONTINUED | OUTPATIENT
Start: 2021-05-07 | End: 2021-05-24 | Stop reason: HOSPADM

## 2021-05-07 RX ORDER — GABAPENTIN 400 MG/1
400 CAPSULE ORAL 3 TIMES DAILY
Status: DISCONTINUED | OUTPATIENT
Start: 2021-05-07 | End: 2021-05-07

## 2021-05-07 RX ORDER — OLANZAPINE 10 MG/1
10 TABLET, ORALLY DISINTEGRATING ORAL 2 TIMES DAILY
Status: DISCONTINUED | OUTPATIENT
Start: 2021-05-07 | End: 2021-05-13

## 2021-05-07 RX ORDER — HYDROXYZINE HYDROCHLORIDE 25 MG/1
25 TABLET, FILM COATED ORAL EVERY 4 HOURS PRN
Status: DISCONTINUED | OUTPATIENT
Start: 2021-05-07 | End: 2021-05-24 | Stop reason: HOSPADM

## 2021-05-07 RX ORDER — LORAZEPAM 2 MG/ML
1 INJECTION INTRAMUSCULAR EVERY 4 HOURS PRN
Status: DISCONTINUED | OUTPATIENT
Start: 2021-05-07 | End: 2021-05-24 | Stop reason: HOSPADM

## 2021-05-07 RX ORDER — AMOXICILLIN 250 MG
1 CAPSULE ORAL 2 TIMES DAILY PRN
Status: DISCONTINUED | OUTPATIENT
Start: 2021-05-07 | End: 2021-05-24 | Stop reason: HOSPADM

## 2021-05-07 RX ORDER — OLANZAPINE 10 MG/2ML
10 INJECTION, POWDER, FOR SOLUTION INTRAMUSCULAR
Status: DISCONTINUED | OUTPATIENT
Start: 2021-05-07 | End: 2021-05-07

## 2021-05-07 RX ORDER — ACETAMINOPHEN 325 MG/1
650 TABLET ORAL EVERY 4 HOURS PRN
Status: DISCONTINUED | OUTPATIENT
Start: 2021-05-07 | End: 2021-05-24 | Stop reason: HOSPADM

## 2021-05-07 RX ORDER — PROCHLORPERAZINE MALEATE 5 MG
5-10 TABLET ORAL EVERY 6 HOURS PRN
Status: DISCONTINUED | OUTPATIENT
Start: 2021-05-07 | End: 2021-05-24 | Stop reason: HOSPADM

## 2021-05-07 RX ORDER — OLANZAPINE 10 MG/1
10 TABLET, ORALLY DISINTEGRATING ORAL
Status: DISCONTINUED | OUTPATIENT
Start: 2021-05-07 | End: 2021-05-07

## 2021-05-07 RX ORDER — HYDRALAZINE HYDROCHLORIDE 25 MG/1
25 TABLET, FILM COATED ORAL 4 TIMES DAILY PRN
Status: DISCONTINUED | OUTPATIENT
Start: 2021-05-07 | End: 2021-05-07

## 2021-05-07 RX ORDER — TRAZODONE HYDROCHLORIDE 50 MG/1
50 TABLET, FILM COATED ORAL
Status: DISCONTINUED | OUTPATIENT
Start: 2021-05-07 | End: 2021-05-24 | Stop reason: HOSPADM

## 2021-05-07 RX ORDER — LIRAGLUTIDE 6 MG/ML
0.6 INJECTION SUBCUTANEOUS
Status: DISCONTINUED | OUTPATIENT
Start: 2021-05-07 | End: 2021-05-07

## 2021-05-07 RX ORDER — LISINOPRIL 10 MG/1
10 TABLET ORAL DAILY
Status: DISCONTINUED | OUTPATIENT
Start: 2021-05-07 | End: 2021-05-07

## 2021-05-07 RX ORDER — CLONIDINE HYDROCHLORIDE 0.1 MG/1
0.1 TABLET ORAL 2 TIMES DAILY PRN
Status: DISCONTINUED | OUTPATIENT
Start: 2021-05-07 | End: 2021-05-11

## 2021-05-07 RX ORDER — DEXTROSE MONOHYDRATE 25 G/50ML
25-50 INJECTION, SOLUTION INTRAVENOUS
Status: DISCONTINUED | OUTPATIENT
Start: 2021-05-07 | End: 2021-05-24 | Stop reason: HOSPADM

## 2021-05-07 RX ORDER — HYDRALAZINE HYDROCHLORIDE 25 MG/1
25 TABLET, FILM COATED ORAL 4 TIMES DAILY PRN
Status: DISCONTINUED | OUTPATIENT
Start: 2021-05-07 | End: 2021-05-24 | Stop reason: HOSPADM

## 2021-05-07 RX ORDER — OLANZAPINE 10 MG/2ML
10 INJECTION, POWDER, FOR SOLUTION INTRAMUSCULAR 3 TIMES DAILY PRN
Status: DISCONTINUED | OUTPATIENT
Start: 2021-05-07 | End: 2021-05-24 | Stop reason: HOSPADM

## 2021-05-07 RX ORDER — LORAZEPAM 1 MG/1
1 TABLET ORAL EVERY 4 HOURS PRN
Status: DISCONTINUED | OUTPATIENT
Start: 2021-05-07 | End: 2021-05-24 | Stop reason: HOSPADM

## 2021-05-07 RX ORDER — NICOTINE POLACRILEX 4 MG
15-30 LOZENGE BUCCAL
Status: DISCONTINUED | OUTPATIENT
Start: 2021-05-07 | End: 2021-05-24 | Stop reason: HOSPADM

## 2021-05-07 RX ORDER — ONDANSETRON 4 MG/1
4-8 TABLET, ORALLY DISINTEGRATING ORAL EVERY 6 HOURS PRN
Status: DISCONTINUED | OUTPATIENT
Start: 2021-05-07 | End: 2021-05-24 | Stop reason: HOSPADM

## 2021-05-07 RX ORDER — OLANZAPINE 10 MG/1
10 TABLET, ORALLY DISINTEGRATING ORAL 3 TIMES DAILY
Status: DISCONTINUED | OUTPATIENT
Start: 2021-05-07 | End: 2021-05-07

## 2021-05-07 RX ORDER — ANALGESIC BALM 1.74; 4.06 G/29G; G/29G
OINTMENT TOPICAL EVERY 4 HOURS PRN
Status: DISCONTINUED | OUTPATIENT
Start: 2021-05-07 | End: 2021-05-14 | Stop reason: ALTCHOICE

## 2021-05-07 RX ORDER — OLANZAPINE 10 MG/1
10 TABLET, ORALLY DISINTEGRATING ORAL 3 TIMES DAILY PRN
Status: DISCONTINUED | OUTPATIENT
Start: 2021-05-07 | End: 2021-05-24 | Stop reason: HOSPADM

## 2021-05-07 RX ADMIN — ONDANSETRON 8 MG: 4 TABLET, ORALLY DISINTEGRATING ORAL at 16:04

## 2021-05-07 RX ADMIN — ACETAMINOPHEN 650 MG: 325 TABLET, FILM COATED ORAL at 13:35

## 2021-05-07 RX ADMIN — LORAZEPAM 1 MG: 1 TABLET ORAL at 19:58

## 2021-05-07 RX ADMIN — OLANZAPINE 10 MG: 10 TABLET, ORALLY DISINTEGRATING ORAL at 13:35

## 2021-05-07 RX ADMIN — LIDOCAINE HYDROCHLORIDE 30 ML: 20 SOLUTION ORAL; TOPICAL at 21:24

## 2021-05-07 RX ADMIN — OLANZAPINE 10 MG: 10 TABLET, ORALLY DISINTEGRATING ORAL at 19:58

## 2021-05-07 ASSESSMENT — ACTIVITIES OF DAILY LIVING (ADL)
LAUNDRY: UNABLE TO COMPLETE
DRESS: SCRUBS (BEHAVIORAL HEALTH)
HYGIENE/GROOMING: INDEPENDENT
DRESS: SCRUBS (BEHAVIORAL HEALTH);INDEPENDENT
ORAL_HYGIENE: INDEPENDENT
LAUNDRY: UNABLE TO COMPLETE
HYGIENE/GROOMING: HANDWASHING;INDEPENDENT
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
## 2021-05-07 NOTE — PROVIDER NOTIFICATION
"   05/07/21 1700   Pain/Comfort   Patient Currently in Pain yes   Preferred Pain Scale DVPRS   Pain Rating Scale (DVPRS) 8   Interfered with your usual ACTIVITY? Completely interferes 10   Interfered with your SLEEP? Completely interferes 10   Affected your MOOD? Completely affects 10   Contributed to your STRESS? Contributes a great deal 10   Pain Location abdomen;back   Pain Side/Orientation anterior   Pain Description constant  (\"bad\")   Pain Management Interventions Provider notified (comment);MD notified (comment);declines;emotional support;medication offered but refused;quiet environment facilitated;repositioned;rest;therapeutic presence   Response to Pain Interventions nonverbal indicators absent/decreased  (pt denies any help/declines icy/hot patch)   Response to Interventions   (pt denies/resting on side)     Patient in room and appears uncomfortable, states he is in a lot of pain, and irritable, moving about from lying to sitting up, feet frequently shifting, frequent sighs as if holding breath,  poor eye contact, audible sniffles, patient clearly more irritable with further questioning. When asked when pain started, patient states lunch time; patient denies any history of stomach issues no ulcers, reflux  or digestion concerns; patient wrapped up in blanket and irritable with writer who requests to turn on light and assess skin further; patient provides urine sample and another couple emesis, about 200 ML of dark color green liquid and white foamy color on top.  Patient frequently spitting. Writer provide hygiene supplies and towel. Patient declines any further tylenol, Maalox or icy/hot patch for pain; patient declines offers of gingerale, ice chips and declines to eat dinner or take metformin medication.   Writer informs Dr Rao of symptoms of irritability, frequent shifting, chills, pain and sniffles seem to mirror other withdrawal symptoms. Drug screen panel added to urine sent to lab for ua/uc. " MSSA and COWS ordered as patient very guarded and difficult to assess/ TORB and placed.
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
## 2021-05-07 NOTE — PLAN OF CARE
Problem: Sleep Disturbance  Goal: Adequate Sleep/Rest  Outcome: Improving   Night Shift Summary (5/6/21 into 05/07/21)    Pt in bed sleeping,breathing quiet and unlabored. He appeared to have slept for 4.5hrs so far.No prn meds given.    Pt continues on suicide,assault and elopement precautions,with no related events occurring this shift.     Will continue to monitor and assess. The 15 mins safety checks cont.
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
## 2021-05-07 NOTE — PROGRESS NOTES
05/07/21 0406   Patient Belongings   Patient Belongings locker;other (see comments)   Patient Belongings Put in Hospital Secure Location (Security or Locker, etc.) plastic bag;suitcase   Belongings Search Yes   Clothing Search Yes   Second Staff Domenico GOODMAN and Jd     Four white plastic bags, two suitcases, and one backpack in locker and intake room cabinets  ALL BELONGINGS UNSEARCHED UPON ADMISSION TO STATION 12  A               Admission:  I am responsible for any personal items that are not sent to the safe or pharmacy.  [facility] is not responsible for loss, theft or damage of any property in my possession.    Signature:  _________________________________ Date: _______  Time: _____                                              Staff Signature:  ____________________________ Date: ________  Time: _____      2nd Staff person, if patient is unable/unwilling to sign:    Signature: ________________________________ Date: ________  Time: _____     Discharge:  [facility] has returned all of my personal belongings:    Signature: _________________________________ Date: ________  Time: _____                                          Staff Signature:  ____________________________ Date: ________  Time: _____

## 2021-05-07 NOTE — PHARMACY
Attempted to completed medication history with Brody over the phone. Brody answered a few questions then terminated the interview.    From [facility] dispense report, it appears patient last filled medications in October 2020. Brody was not clear about his recent compliance with his medications.    Please consult a pharmacist when Brody would be more agreeable to discuss his home medications.    Thank you,  Jennifer Vitale, PharmD, [facility]  Behavioral Health Pharmacist  [facility] Ascom: *[phone] or *[phone]

## 2021-05-07 NOTE — PLAN OF CARE
Problem: Behavioral Health Plan of Care  Goal: Adheres to Safety Considerations for Self and Others  Outcome: No Change     Problem: Behavioral Health Plan of Care  Goal: Absence of New-Onset Illness or Injury  Outcome: No Change     Pt alert and oriented to place and self throughout shift.  He was primarily isolative to his room, sleeping and watching tv during the day.  Pt appeared neat and tidy and speech was clear and coherent.  He ate meals and was medication compliant.  Pt was compliant with lab draw.  He became upset with the prepetition screener from [facility] and through the phone just before lunch.  Pt denied SI/HI/SIB.  He denied any psychosis.  Pt stated he had pain of 8/10 in the afternoon for which he received PRN tylenol.  He denied and acute physical health concerns or side effects from medications.

## 2021-05-07 NOTE — PLAN OF CARE
BEHAVIORAL TEAM DISCUSSION    Participants: Dr. Emilee Rao, Jalyn Reyes RN, Shira Macedo RN, SB- Jonelle Wilson LM, [facility]   Progress: pt just admitted last evening, continue to assess  Anticipated length of stay: 1-2 weeks   Continued Stay Criteria/Rationale: pt exhibiting psychotic symptoms  Medical/Physical: none noted  Precautions:   Behavioral Orders   Procedures     Assault precautions     Code 1 - Restrict to Unit     Elopement precautions     Routine Programming     As clinically indicated     Status 15     Every 15 minutes.     Suicide precautions     Patients on Suicide Precautions should have a Combination Diet ordered that includes a Diet selection(s) AND a Behavioral Tray selection for Safe Tray - with utensils, or Safe Tray - NO utensils       Plan: conduct initial psychosocial assessment, meet with pyschiatrist and treatment team.  Petition for commitment was initiated prior to admission   Rationale for change in precautions or plan: no change

## 2021-05-07 NOTE — PROVIDER NOTIFICATION
"Jayme and You RN assist patient following emesis and day shift report of worsening back pain and nausea and on and off emesis today. Patient reports severe abdominal pain, \"dull\" low back and now his entire abdomen x 4 quads, patient emesis small and yellow beni in color observed. Patient lie on floor at one time due to pain. Patient denies any help from prn tylenol given earlier. Vitals checked.    Dr Rao and Internal Med, Vy THOMSON informed of patient status and increased pain.  Patient offered ice/Hot packs or patches and declines. Soft  care mattress available for patient once up from bed. Stat labs drawn and awaiting results.      05/07/21 1540   Vital Signs   Temp 97.4  F (36.3  C)   Temp src Oral   Resp 18   Pulse 65   Pulse Rate Source Monitor   BP (!) 156/83   Oxygen Therapy   SpO2 98 %   O2 Device None (Room air)   Pain/Comfort   Patient Currently in Pain yes   Preferred Pain Scale DVPRS   Patient's Stated Pain Goal 10   Pain Rating Scale (DVPRS) 10   Interfered with your usual ACTIVITY? Completely interferes 10   Interfered with your SLEEP? Completely interferes 10   Affected your MOOD? Completely affects 10   Contributed to your STRESS? Contributes a great deal 10   Pain Location back   Pain Side/Orientation medial;bilateral;lower;generalized   Pain Description dull     "

## 2021-05-08 LAB
GLUCOSE BLDC GLUCOMTR-MCNC: 187 MG/DL (ref 70–99)
GLUCOSE BLDC GLUCOMTR-MCNC: 217 MG/DL (ref 70–99)

## 2021-05-08 PROCEDURE — 250N000013 HC RX MED GY IP 250 OP 250 PS 637: Performed by: PSYCHIATRY & NEUROLOGY

## 2021-05-08 PROCEDURE — 99207 PR NO CHARGE LOS: CPT | Performed by: PHYSICIAN ASSISTANT

## 2021-05-08 PROCEDURE — 124N000002 HC R&B MH UMMC

## 2021-05-08 PROCEDURE — 250N000013 HC RX MED GY IP 250 OP 250 PS 637: Performed by: PHYSICIAN ASSISTANT

## 2021-05-08 PROCEDURE — 999N001017 HC STATISTIC GLUCOSE BY METER IP

## 2021-05-08 RX ORDER — LISINOPRIL 10 MG/1
10 TABLET ORAL DAILY
Status: DISCONTINUED | OUTPATIENT
Start: 2021-05-08 | End: 2021-05-09

## 2021-05-08 RX ADMIN — METFORMIN HYDROCHLORIDE 500 MG: 500 TABLET ORAL at 07:56

## 2021-05-08 RX ADMIN — METFORMIN HYDROCHLORIDE 500 MG: 500 TABLET ORAL at 19:53

## 2021-05-08 RX ADMIN — OLANZAPINE 10 MG: 10 TABLET, ORALLY DISINTEGRATING ORAL at 07:56

## 2021-05-08 RX ADMIN — OLANZAPINE 10 MG: 10 TABLET, ORALLY DISINTEGRATING ORAL at 19:53

## 2021-05-08 ASSESSMENT — ACTIVITIES OF DAILY LIVING (ADL)
ORAL_HYGIENE: INDEPENDENT
HYGIENE/GROOMING: INDEPENDENT
ORAL_HYGIENE: INDEPENDENT
DRESS: SCRUBS (BEHAVIORAL HEALTH);INDEPENDENT
LAUNDRY: UNABLE TO COMPLETE
HYGIENE/GROOMING: INDEPENDENT
DRESS: SCRUBS (BEHAVIORAL HEALTH)
LAUNDRY: UNABLE TO COMPLETE

## 2021-05-08 NOTE — PROVIDER NOTIFICATION
"Patient last emesis of green color bile was at 1930. No emesis following ativan and zyprexa at 0815 pm;     2100, patient denies any relief from abdominal discomfort, \"still the same\", patient either more calm or fatigue, \"leave me alone, I don't want to take any more vital signs again,\" Patient accepts GI cocktail with lidocaine and Maalox/simethicone at 2130, Patient going to try and rest. Patient informed to let staff know if any changes or increased discomfort. Will revisit check of vitals and/or temp later.       05/07/21 2100   Quick Adds   Quick Adds Comments  (declines vital signs to be done again)   Pain/Comfort   Patient Currently in Pain yes   Preferred Pain Scale DVPRS   Pain Location abdomen   Pain Side/Orientation anterior   Pain Description constant;dull   Pain Management Interventions medication (see MAR)   Response to Pain Interventions intervention not effective per patient   Response to Interventions No change in pain         "

## 2021-05-08 NOTE — PROGRESS NOTES
Brief Medicine Note    Contacted by nursing yesterday evening regarding emesis. UA, CMP, lipase, and CBC all obtained and were unremarkable. Patient still having some nausea today. Psychiatry team has concern for a withdrawal syndrome, a Utox is pending. I agree that his constellation of symptoms does seem most consistent with a withdrawal symptom.     Blood pressure elevated today, possibly related to withdrawal but patient does have a histroy of hypertension and was not compliant with lisinopril.     BG more elevated this morning.     Plan:   - Resume home lisinopril 10 mg daily with hold parameters   - Monitor BG for now. If persistently >180 would prefer to first resume his Victoza.     Medicine will follow peripherally. Please do not hesitate to contact if new questions or concerns arise.     Vy Hinojosa PA-C  Hospitalist Service  Pager: [phone]

## 2021-05-08 NOTE — PROVIDER NOTIFICATION
05/07/21 2015   Pain/Comfort   Patient Currently in Pain yes   Preferred Pain Scale DVPRS   Pain Rating Scale (DVPRS) 7   Interfered with your usual ACTIVITY? Completely interferes 10   Interfered with your SLEEP? Completely interferes 10   Affected your MOOD? Completely affects 10   Contributed to your STRESS? Contributes a great deal 10   Pain Location abdomen   Pain Side/Orientation left;right;anterior   Pain Description dull;constant   Pain Management Interventions medication (see MAR);Provider notified (comment);rest  (gingerale sips)     Patient is reporting still in pain and wondering if he can see  Or receive pain med; patient informed all labs are normal and likely viral and/or conbtributed by anxiety. Patient although reluctant that medication will help, finally accepts PO ativan and Zyprexa at 0815 pm and now resting. Will follow up on relief.

## 2021-05-08 NOTE — PROVIDER NOTIFICATION
Patient up informs writer that his stomach is in unbearable pain, unable to describe pain but says it is not a gas pain and not cardiac chest pain, appears to be guarding it, pain is only in his belly. Patient reports feeling nauseous and very uncomfortable. Patient denies any relief from rest or tylenol; denies any radiation or aggravating or alleviating factors. BP elevated and patient declines any antihypertensive or other PO med.    Labs are resulting. Writer consults with hospitalist for further direction.    1945- o/c hospitalist, Dr Wu consulted on patient unresolved pain and ativan recommended as well as try Maalox, additional antiemetics.       05/07/21 1908   Vital Signs   Temp 98  F (36.7  C)   Pulse 66   BP (!) 173/78   Oxygen Therapy   SpO2 98 %   O2 Device None (Room air)

## 2021-05-08 NOTE — PROGRESS NOTES
"[facility] assessment: Approached patient at around 0400 to assess his withdrawals. Patient was up, alert with no distress. He denied having any physical withdrawals. He reported struggling to fall asleep. Became irritable when this writer requested to check his vitals. He told this writer to go away. \"I don't have withdrawals. Leave me alone\".  Writer offered PRNs for sleep but patient refused.Writer exited patient room. Patient did not appear to be in withdrawals. Will continue to monitor closely.   "

## 2021-05-08 NOTE — PLAN OF CARE
Problem: Suicidal Behavior  Goal: Suicidal Behavior is Absent or Managed  Outcome: No Change  Isolative to room all shift, Nausea and vomiting, reports abdominal pain, anxious/irritable, 's, declines dinner & meds, frustrated and difficult to assess-guarded/paranoid, Urine sent for repeat drugg screen panel MSSA and COWS started due to restlessness, sniffling and yawning early in the shift; Zofran & Tylenol not helpful, stat labs ordered by psych and medicine provider WNL, VSS although BP elevated 173/78 P 66 at 7 pm; contact [facility] due to unresolved pain, last emesis at 7:30 pm (color of emesis yellow to dark green) Provider encourage ativan and GI cocktail, patient agrees tto take along with zyprexa and appear to be less anxious at 9 pm, reports his pain unchanged and declines any further VS or BG check, ginger-mino at bedside and patient appears to be resting; compazine, prn hydralazine available. (see PN's)

## 2021-05-08 NOTE — PROGRESS NOTES
05/08/21 1118   Patient Belongings   Did you bring any home meds/supplements to the hospital?  Yes   Disposition of meds  Sent to security/pharmacy per site process   Patient Belongings locker   Patient Belongings Put in Hospital Secure Location (Security or Locker, etc.) cell phone/electronics;clothing;medication(s);money (see comment);shoes;suitcase   Belongings Search Yes  (Locker and black backpack checked. Two suitcases unchecked.)   Clothing Search Yes  (Clothing removed from these sources and washed)   Second Staff Monisha WATTS   Comment $7.59 in cash   A             Patient locker and black backpack searched, two suitcases remain not searched.   Cell phone with   $7.59 in cash  [employer] high tops with laces  2 and 1/2 cigarettes  3 lighters, one is torch lighter  Green flip flops  Two winter jackets  Two sleeveless white T-shirts  One crew neck white T-shirt  Six pair of black socks  Camo pants  Camo lounge pants  Black warm up jacket  Yellow T-shirt  Green long sleeve shirt   [employer] with ID and multiple restaurant discount cards   Black backpack contains:  Butch personal groomer kit  Electric toothbrush with   Three DVD's  Multiple medications and diabetic supplies given to RN    Sent to security: 3 debit cards and one EBT card. (envelope #[phone]).    Admission:  I am responsible for any personal items that are not sent to the safe or pharmacy.  [facility] is not responsible for loss, theft or damage of any property in my possession.    Signature:  _________________________________ Date: _______  Time: _____                                              Staff Signature:  ____________________________ Date: ________  Time: _____      2nd Staff person, if patient is unable/unwilling to sign:    Signature: ________________________________ Date: ________  Time: _____     Discharge:  [facility] has returned all of my personal belongings:    Signature:  _________________________________ Date: ________  Time: _____                                          Staff Signature:  ____________________________ Date: ________  Time: _____

## 2021-05-08 NOTE — PLAN OF CARE
Problem: Behavioral Health Plan of Care  Goal: Optimized Coping Skills in Response to Life Stressors  Outcome: No Change     Problem: Suicidal Behavior  Goal: Suicidal Behavior is Absent or Managed  Outcome: No Change      Pt alert and oriented to place and self throughout shift.  He was primarily isolative to his room throughout the day, complianing of nausea and vomiting.  Pt refused meals and vital signs, regardless of several attempts of encouragement.  He was med compliant and agreeable to BG checks (B @ 0745).  Pt appears untidy and although speech is clear and coherent, pt speaks very quiet today.  He denies SI/HI/SIB.  Pt denies any psychosis.  When approached about assessment for withdrawal symptoms, pt denies withdrawal or substance use, therefore, also refusing comfort PRNs.  Pt continues to state nausea and significant back pain, but denies any side effects from medications.

## 2021-05-09 LAB — GLUCOSE BLDC GLUCOMTR-MCNC: 206 MG/DL (ref 70–99)

## 2021-05-09 PROCEDURE — 250N000013 HC RX MED GY IP 250 OP 250 PS 637: Performed by: PHYSICIAN ASSISTANT

## 2021-05-09 PROCEDURE — 124N000002 HC R&B MH UMMC

## 2021-05-09 PROCEDURE — 99207 PR NO CHARGE LOS: CPT | Performed by: PHYSICIAN ASSISTANT

## 2021-05-09 PROCEDURE — 250N000013 HC RX MED GY IP 250 OP 250 PS 637: Performed by: PSYCHIATRY & NEUROLOGY

## 2021-05-09 PROCEDURE — 999N001017 HC STATISTIC GLUCOSE BY METER IP

## 2021-05-09 RX ORDER — DIAZEPAM 5 MG
5-20 TABLET ORAL EVERY 30 MIN PRN
Status: DISCONTINUED | OUTPATIENT
Start: 2021-05-09 | End: 2021-05-11

## 2021-05-09 RX ORDER — LISINOPRIL 5 MG/1
5 TABLET ORAL DAILY
Status: DISCONTINUED | OUTPATIENT
Start: 2021-05-10 | End: 2021-05-24 | Stop reason: HOSPADM

## 2021-05-09 RX ADMIN — METFORMIN HYDROCHLORIDE 500 MG: 500 TABLET ORAL at 10:20

## 2021-05-09 RX ADMIN — ALUMINUM HYDROXIDE, MAGNESIUM HYDROXIDE, AND SIMETHICONE 30 ML: 200; 200; 20 SUSPENSION ORAL at 20:55

## 2021-05-09 RX ADMIN — ALUMINUM HYDROXIDE, MAGNESIUM HYDROXIDE, AND SIMETHICONE 30 ML: 200; 200; 20 SUSPENSION ORAL at 00:24

## 2021-05-09 RX ADMIN — OLANZAPINE 10 MG: 10 TABLET, ORALLY DISINTEGRATING ORAL at 10:20

## 2021-05-09 RX ADMIN — OLANZAPINE 10 MG: 10 TABLET, ORALLY DISINTEGRATING ORAL at 19:46

## 2021-05-09 ASSESSMENT — ACTIVITIES OF DAILY LIVING (ADL)
LAUNDRY: UNABLE TO COMPLETE
LAUNDRY: UNABLE TO COMPLETE
HYGIENE/GROOMING: INDEPENDENT
DRESS: SCRUBS (BEHAVIORAL HEALTH);INDEPENDENT
ORAL_HYGIENE: INDEPENDENT
HYGIENE/GROOMING: INDEPENDENT
ORAL_HYGIENE: INDEPENDENT
DRESS: SCRUBS (BEHAVIORAL HEALTH)

## 2021-05-09 NOTE — PROGRESS NOTES
Brief Medicine Note    Medicine following for diabetes and hypertension management.     Blood pressure: Resumed PTA lisinopril 10 mg qday due to severely elevated BP on admission. SBP now 107 this morning.    - Decrease lisinopril to 5 mg daily, hold for SBP <110     DM2: A1C 8.3. Non-compliant with all medications for at least 6 months PTA. Intermittently compliant with BG checks. Resumed metformin on 5/7.   - Continue metformin 500 mg BID, will gradually up-titrate to goal dose of 1000 mg BID as follows:   - On 5/12 increase metformin to 500 mg qAM, 1000 mg qPM   - On 5/17 increase metformin to 1000 mg BID. Would discharge him on this dose.   - Notify Medicine if patient having significant GI side effects (nausea, vomiting, diarrhea), in which case would delay dose increases.    - BG checks BID   - Hypoglycemia protocol    Medicine will follow peripherally for now. Please do not hesitate to contact if new questions or concerns arise.     Vy Hinojosa PA-C  Hospitalist Service  Pager: [phone]

## 2021-05-09 NOTE — PLAN OF CARE
Problem: Sleep Disturbance  Goal: Adequate Sleep/Rest  Outcome: No Change     Problem: Suicidal Behavior  Goal: Suicidal Behavior is Absent or Managed  Outcome: No Change     Pt intermittently alert and oriented to place and self throughout shift.  He was primarily isolative in his room, laying in bed throughout the day.  Pt appears untidy and needing prompts to perform ADLs.  Pt has pressured speech and answered nursing assessment questions minimally.  Pt denied symptoms of substance withdrawal.  After several prompts from nursing staff, pt was agreeable to VS check.  VS were stable this shift and lisinopril was held because parameters were not met.  Pt did not eat meals.  He denied SI/HI/SIB.  Pt denied any psychosis.  Pt denied acute physical health concerns, including nausea and vomiting and well as denying any side effects from medications.  Pt refused BG checks throughout shift.

## 2021-05-09 NOTE — PLAN OF CARE
Problem: Behavioral Health Plan of Care  Goal: Plan of Care Review  Outcome: No Change  Flowsheets (Taken 5/8/2021 1905)  Plan of Care Reviewed With: patient  Patient Agreement with Plan of Care: agrees     Patient isolative to room all evening sleeping only awake when prompted by staff during assessments and interventions. Patient upon approach blunted in affect, very short often turning away from writer during verbal assessment. Patient reported continued suicidal thoughts with no plan or intent to act agreeing to contract for safety. Patient rated anxiety 8/10 and depression 8/10. Patient stated his goal for hospitalization is to get assistance with housing mentioning interest in an IRTS program. Patient monitored for withdrawal this evening with COWS score of 2 and MSSA of 9 then later in the evening a 10 mainly due to lack of appetite. Patient refused PRN Clonidine and requesting to remain sleeping. Patient denied any nausea or emesis today though does still report GI discomfort. Patient refused glucose check and scheduled medications at dinner time but later in the evening accepting of glucose checks and scheduled Metformin and Zyprexa. Patient was not in the milieu at all this evening and refused prompting for ADL's.

## 2021-05-09 NOTE — PROVIDER NOTIFICATION
05/09/21 0600   Sleep/Rest/Relaxation   Sleep/Rest/Relaxation feeling unrested   Night Time # Hours 4.25 hours     Pt had a quiet night with no behavior or safety concerns. But apparently pt had some sleep disturbances that prevented him from falling and staying asleep last night. Reported heart burn and received Maalox with some relief. MSSA 8, provider notified and new orders received to start MSSA protocol. Pt refuses the prn Valium offered, denying withdrawal symptoms. Will continue to assess and encourage compliance.

## 2021-05-10 LAB — GLUCOSE BLDC GLUCOMTR-MCNC: 192 MG/DL (ref 70–99)

## 2021-05-10 PROCEDURE — 99232 SBSQ HOSP IP/OBS MODERATE 35: CPT | Mod: GC | Performed by: PSYCHIATRY & NEUROLOGY

## 2021-05-10 PROCEDURE — 124N000002 HC R&B MH UMMC

## 2021-05-10 PROCEDURE — 250N000013 HC RX MED GY IP 250 OP 250 PS 637: Performed by: PSYCHIATRY & NEUROLOGY

## 2021-05-10 PROCEDURE — 250N000013 HC RX MED GY IP 250 OP 250 PS 637: Performed by: STUDENT IN AN ORGANIZED HEALTH CARE EDUCATION/TRAINING PROGRAM

## 2021-05-10 PROCEDURE — 999N001017 HC STATISTIC GLUCOSE BY METER IP

## 2021-05-10 RX ORDER — FLUOXETINE 10 MG/1
10 CAPSULE ORAL DAILY
Status: DISCONTINUED | OUTPATIENT
Start: 2021-05-10 | End: 2021-05-17

## 2021-05-10 RX ADMIN — FLUOXETINE 10 MG: 10 CAPSULE ORAL at 14:29

## 2021-05-10 RX ADMIN — TRAZODONE HYDROCHLORIDE 50 MG: 50 TABLET ORAL at 01:10

## 2021-05-10 ASSESSMENT — ACTIVITIES OF DAILY LIVING (ADL)
HYGIENE/GROOMING: PROMPTS
DRESS: SCRUBS (BEHAVIORAL HEALTH);PROMPTS
HYGIENE/GROOMING: PROMPTS
DRESS: SCRUBS (BEHAVIORAL HEALTH)
ORAL_HYGIENE: PROMPTS
ORAL_HYGIENE: PROMPTS
LAUNDRY: UNABLE TO COMPLETE

## 2021-05-10 NOTE — PLAN OF CARE
"Night Shift Summary (5/9/21 into 05/10/21)    Pt received in room asleep at start of shift. Breathing quiet and unlabored. Woke up around 0100. Affect irritable. Pt appeared uncomfortable. Reported experiencing difficulty sleeping. Given 50 mg trazodone at 0110. Writer encouraged pt to take time before getting up to make sure he does not feel dizzy and to let staff know if he does. Pt refused vitals: \"I don't need my vitals checked\"; unable to complete MSSA/COWS. Does not appear to be withdrawing and denies symptoms. Attempted to assess withdrawal symptoms again at 0400. Pt stated, \"Ahh you're waking me up again. I'm not withdrawing from anything.\" Appears to have slept 4 hours.    Suicide, Assault, Elopement and Withdrawal precautions. Any related events noted above.     Will continue to monitor and assess.         Problem: Sleep Disturbance  Goal: Adequate Sleep/Rest  Outcome: No Change   Slept 4 hours.     Problem: Alcohol Withdrawal  Goal: Alcohol Withdrawal Symptom Control  Outcome: No Change   Symptoms under control.    Problem: Behavioral Health Plan of Care  Goal: Absence of New-Onset Illness or Injury  Outcome: Improving   Remains safe.   "

## 2021-05-10 NOTE — PROGRESS NOTES
Brief Medicine Note:    IM peripherally following blood pressure and glucose. BP is stable. Glucose 192 on last check with plan to increase metformin on 5/12/2021 and 5/17/2021  - Continue bid glucose checks, hypoglycemia protocol   - Contact medicine if glucose <70, spikes >350 or if consistently >250  - Continue lisinopril with hold parameters    Medicine will sign off. Please contact with future questions or concerns    Rachel Cheatham PA-C  Internal Medicine [facility]  [phone]

## 2021-05-10 NOTE — PLAN OF CARE
"Patient presents as guarded, irritable, and socially withdrawn.  He has been isolative to his room for the entire shift.  His affect is variable, ranging from blunted to tense/ irritable.  He endorses depressive symptoms, but he reports that the medication changes in the hospital have been \"helping my mood\".  He was unable to elaborate when asked.  He denies any suicidal ideation at this time.  He continues to refuse most interventions.  He was unwilling to allow RN writer to check his blood glucose this shift.  He refused his meal tray when this was presented to him at dinner.  He indicated that he would be receptive to eating grapes and a salad, noting that he has not eaten for three days and reporting that he would likely be unable to tolerate the pasta, marinara, and green beans that came up on his dinner tray.  RN writer called the dietary supervisor, explained the situation and the clinical indications for sending a salad to the unit ASAP.  Updated Brody that we anticipate a dinner salad entree to arrive on the unit in the next 30 to 45 minutes.  Provided him with a cup of grapes from the unit's kitchen.  He accepted this, but he has not eaten any of the grapes so far.  He has not taken any sips of water or any other fluids so far this evening.  He initially stated that he would accept his Metformin and Zyprexa this evening (which he actually agreed to take early this evening), but he refused all of his scheduled medications when they were offered.  He stated, \"You know man, I just really don't feel like taking any medications right now!\".  He was encouraged to reconsider this decision and was told that we can administer these refused medications at any point in the shift he is willing to take them.  Brody affirmed an understanding of this.  He denies any acute physical concerns at this time.  He refused vital signs assessment when this was offered.    "

## 2021-05-10 NOTE — PLAN OF CARE
Problem: Behavioral Health Plan of Care  Goal: Plan of Care Review  Outcome: No Change    Pt observed to be isolative and withdrawn. Spent most of his time in his room, resting or watching television. He needed prompting for vital signs taking and some ADLs. He did took a shower. Presents with flat affect, slightly irritable. Pressured speech noted. Denies SI/SIB/HI. Denies experiencing any type of hallucinations as of time of assessment. Denies having anxiety. Claimed having depression, rated it as 8/10.   Pt agreed to have his blood glucose taken. B mg/dl. Pt refused to take due Metformin. Re approached but still refused.   Encouraged pt to take dinner but insistently refused. Complained feeling nauseous. PRN Zofran offered. Pt refused to take Zofran. Education given on the indication of medication but pt still refused. Snacks were offered, pt accepted and able to eat 50% of share.   Pt did take his due HS medication. Denies experiencing side effects. At , pt complained of stomachache and requested for Maalox. PRN Maalox given per request.   MSSA scores: 7 and 7. No Valium given this shift. COWS: 1 and 3.   Needs attended to. Staff will continue to monitor. No further concerns noted as of this time.

## 2021-05-10 NOTE — PLAN OF CARE
Assessment/Intervention/Current Symtoms and Care Coordination  -Pt's care discussed with treatment team in daily team meeting and chart notes were reviewed     -Petition for MI/CD commitment with [facility] was started in [facility] due to short 72 hr hold.  Screener called this morning to assess him.      - Pt's CCM Stephenie Adkins       Patient signed TENZIN's for [facility] (Stephenie Adkins), including for [employer]. Patient expressed not wanting to be in the hospital. It was observed that he had only taken one bite of his food and then refused to eat the rest.      Discharge Plan or Goal  Still determining discharge plan for this patient.      Barriers to Discharge   Petition for commitment through [facility], awaiting dates on this.     Referral Status  No referrals made at this time.    Legal Status   Court hold

## 2021-05-10 NOTE — PLAN OF CARE
Patient signed TENZIN's for [employer] [facility], including for [employer]. Patient expressed not wanting to be in the hospital. It was observed that he had only taken one bite of his food and then refused to eat the rest.

## 2021-05-10 NOTE — PLAN OF CARE
Problem: Behavioral Health Plan of Care  Goal: Absence of New-Onset Illness or Injury  Outcome: No Change     Problem: Suicidal Behavior  Goal: Suicidal Behavior is Absent or Managed  Outcome: No Change     Problem: Alcohol Withdrawal  Goal: Alcohol Withdrawal Symptom Control  Outcome: No Change     Pt appeared to be alert and oriented to place and self throughout shift.  He was isolative to his room throughout the entirety of the day.  Pt needed several prompts to complete minimal ADLs.  Pt agreed to BG check (192), however refused VS and medications during shift.  Pt ate minimally during each meal.  He appears to not be getting out of his bed aside from using the restroom during shift, he is untidy in appearance.  Pt's speech is muffled but pressured.  He denied SI/HI/SIB.  Pt denied any psychosis.  He denied any acute physical health concerns or pain.
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
## 2021-05-11 ENCOUNTER — APPOINTMENT (OUTPATIENT)
Dept: GENERAL RADIOLOGY | Facility: CLINIC | Age: 61
End: 2021-05-11
Attending: PHYSICIAN ASSISTANT
Payer: COMMERCIAL

## 2021-05-11 LAB
AMPHET UR CFM-MCNC: 1160 NG/ML
AMPHET/CREAT UR: 507 NG/MG{CREAT}
BZE UR CFM-MCNC: 5680 NG/ML
BZE/CREAT UR: 2480 NG/MG{CREAT}
CREAT UR-MCNC: 229 MG/DL
GABAPENTIN UR QL CFM: PRESENT
GLUCOSE BLDC GLUCOMTR-MCNC: 176 MG/DL (ref 70–99)
GLUCOSE BLDC GLUCOMTR-MCNC: 209 MG/DL (ref 70–99)
HIV 1+2 AB+HIV1 P24 AG SERPL QL IA: NONREACTIVE
LORAZEPAM UR QL CFM: PRESENT
METHAMPHET UR CFM-MCNC: 3140 NG/ML
METHAMPHET/CREAT UR: 1371 NG/MG{CREAT}
RPT COMMENT: ABNORMAL

## 2021-05-11 PROCEDURE — 250N000013 HC RX MED GY IP 250 OP 250 PS 637: Performed by: PSYCHIATRY & NEUROLOGY

## 2021-05-11 PROCEDURE — 71046 X-RAY EXAM CHEST 2 VIEWS: CPT | Mod: 26 | Performed by: RADIOLOGY

## 2021-05-11 PROCEDURE — 250N000013 HC RX MED GY IP 250 OP 250 PS 637: Performed by: PHYSICIAN ASSISTANT

## 2021-05-11 PROCEDURE — 250N000013 HC RX MED GY IP 250 OP 250 PS 637: Performed by: STUDENT IN AN ORGANIZED HEALTH CARE EDUCATION/TRAINING PROGRAM

## 2021-05-11 PROCEDURE — 71046 X-RAY EXAM CHEST 2 VIEWS: CPT

## 2021-05-11 PROCEDURE — 999N001017 HC STATISTIC GLUCOSE BY METER IP

## 2021-05-11 PROCEDURE — 124N000002 HC R&B MH UMMC

## 2021-05-11 PROCEDURE — 99232 SBSQ HOSP IP/OBS MODERATE 35: CPT | Mod: GC | Performed by: PSYCHIATRY & NEUROLOGY

## 2021-05-11 RX ADMIN — METFORMIN HYDROCHLORIDE 500 MG: 500 TABLET, FILM COATED ORAL at 18:58

## 2021-05-11 RX ADMIN — OLANZAPINE 10 MG: 10 TABLET, ORALLY DISINTEGRATING ORAL at 08:18

## 2021-05-11 RX ADMIN — FLUOXETINE 10 MG: 10 CAPSULE ORAL at 08:18

## 2021-05-11 RX ADMIN — LISINOPRIL 5 MG: 5 TABLET ORAL at 08:18

## 2021-05-11 RX ADMIN — OLANZAPINE 10 MG: 10 TABLET, ORALLY DISINTEGRATING ORAL at 19:34

## 2021-05-11 RX ADMIN — METFORMIN HYDROCHLORIDE 500 MG: 500 TABLET, FILM COATED ORAL at 08:18

## 2021-05-11 ASSESSMENT — ACTIVITIES OF DAILY LIVING (ADL)
ORAL_HYGIENE: PROMPTS
LAUNDRY: UNABLE TO COMPLETE
HYGIENE/GROOMING: PROMPTS
DRESS: SCRUBS (BEHAVIORAL HEALTH)
LAUNDRY: UNABLE TO COMPLETE
HYGIENE/GROOMING: PROMPTS
DRESS: SCRUBS (BEHAVIORAL HEALTH);INDEPENDENT
ORAL_HYGIENE: PROMPTS

## 2021-05-11 NOTE — PLAN OF CARE
Problem: Behavioral Health Plan of Care  Goal: Adheres to Safety Considerations for Self and Others  Outcome: Improving     Problem: Behavioral Health Plan of Care  Goal: Absence of New-Onset Illness or Injury  Outcome: Improving     Problem: Suicidal Behavior  Goal: Suicidal Behavior is Absent or Managed  Outcome: Improving     Pt appears to be alert and oriented to place and self throughout shift.  He remains primarily isolated to his room throughout the day, however has been visible on occasion in the milieu for phone calls.  Pt appearance is slightly untidy and lacks basic ADLs without staff prompts.  His speech is clear and coherent.  Pt is more compliant with medical cares today, including VS, BG check, medication compliance, and eating roughly 50% of his meals.  Pt is not observed taking in much for fluids.  He denied SI/HI/SIB.  Pt denied any psychosis.  Pt denied any acute physical health concerns, pain, or side effects from medications.

## 2021-05-11 NOTE — PROGRESS NOTES
Brief Medicine Note:    IM contacted by psychiatry that overnight nurse noted positive quantiferon gold from 11/2020. Per psychiatry, patient asymptomatic aside from some night sweats. It appears as if this lab was checked per psychiatry at the time and medicine was not involved with this test. Per d/w ID, will obtain CXR and HIV tests. Medicine will peripherally follow    Rachel Cheatham PA-C  Internal Medicine [facility]  [phone]

## 2021-05-11 NOTE — PLAN OF CARE
Problem: Alcohol Withdrawal  Goal: Alcohol Withdrawal Symptom Control  Outcome: Completed   MSSA discontinued.

## 2021-05-11 NOTE — PLAN OF CARE
Assessment/Intervention/Current Symtoms and Care Coordination  -Pt's care discussed with treatment team in daily team meeting and chart notes were reviewed     -Assessment/Intervention/Current Symtoms and Care Coordination  -Pt's care discussed with treatment team in daily team meeting and chart notes were reviewed     - Court hold and court dates received from [facility]   Exam and Preliminary hearin/12 @ 11am  Final hearin/20 @ 9am     - Writer sent court dates to ITV team.     Discharge Plan or Goal  Still determining discharge plan for this patient.      Barriers to Discharge   Petition for commitment through [facility]     Referral Status  No referrals made at this time.     Legal Status   Court hold

## 2021-05-11 NOTE — PROGRESS NOTES
"[facility], [place]   Psychiatric Progress Note  Hospital Day: 4        Interim History:   The patient's care was discussed with the treatment team during the daily team meeting and/or staff's chart notes were reviewed.  Staff reported Brody eating minimally/drinking minimally. Declined evening medications, stating, \"I just don't feel like taking medications right now!\"     Upon interview, reports that he's doing \"alright\" this AM, arm hurts slightly from sleeping on it funny, as does his R outer thigh. Reports that thigh hurts when he gets up and walks, not new. Says that depression is a \"6 or 7,\" medications somewhat helpful. No suicidal thoughts today. Denies any worry about people trying to harm him today. No side effects noted with prozac or zyprexa. Continues to have poor appetite, no nausea. Reports having a good appetite before coming into the hospital. Sleeping \"off and on,\" no nightmares nor pain waking him up.     Suicidal ideation: none today  Homicidal ideation: none  Medication side effects reported: none  Acute medical concerns: none today. Continues to have low appetite. Encouraged him to focus on getting enough fluids, and was agreeable to Boost being added to his trays.    Patient requested his own slippers/shoes to wear on the unit, agreed that was fine. No further questions or concerns.           Medications:       FLUoxetine  10 mg Oral Daily     lisinopril  5 mg Oral Daily     [START ON 5/12/2021] metFORMIN  1,000 mg Oral Daily with supper     [START ON 5/17/2021] metFORMIN  1,000 mg Oral BID w/meals     metFORMIN  500 mg Oral BID w/meals     [START ON 5/12/2021] metFORMIN  500 mg Oral Daily with breakfast     OLANZapine zydis  10 mg Oral BID          Allergies:   No Known Allergies       Labs:     Recent Results (from the past 24 hour(s))   Glucose by meter    Collection Time: 05/11/21  8:13 AM   Result Value Ref Range    Glucose 176 (H) 70 - 99 mg/dL          " "Psychiatric Examination:     /83   Pulse 91   Temp 97.7  F (36.5  C) (Tympanic)   Resp 16   SpO2 98%   Weight is 0 lbs 0 oz  There is no height or weight on file to calculate BMI.    Weight over time:  There were no vitals filed for this visit.    Orthostatic Vitals       Most Recent      Lying Orthostatic /66 05/09 0900    Lying Orthostatic Pulse (bpm) 95 05/09 0900    Sitting Orthostatic /74 05/10 0842    Sitting Orthostatic Pulse (bpm) 115 05/10 0842            Cardiometabolic risk assessment. 05/10/21      Reviewed patient profile for cardiometabolic risk factors    Date taken /Value  REFERENCE RANGE   Abdominal Obesity  (Waist Circumference)   See nursing flowsheet Women ?35 in (88 cm)   Men ?40 in (102 cm)      Triglycerides  Triglycerides   Date Value Ref Range Status   09/28/2020 95 <150 mg/dL Final       ?150 mg/dL (1.7 mmol/L) or current treatment for elevated triglycerides   HDL cholesterol  HDL Cholesterol   Date Value Ref Range Status   09/28/2020 54 >39 mg/dL Final   ]   Women <50 mg/dL (1.3 mmol/L) in women or current treatment for low HDL cholesterol  Men <40 mg/dL (1 mmol/L) in men or current treatment for low HDL cholesterol     Fasting plasma glucose (FPG) Lab Results   Component Value Date     05/07/2021      FPG ?100 mg/dL (5.6 mmol/L) or treatment for elevated blood glucose   Blood pressure  BP Readings from Last 3 Encounters:   05/11/21 123/83   11/19/20 (!) 149/89   10/30/20 (!) 141/80    Blood pressure ?130/85 mmHg or treatment for elevated blood pressure   Family History  See family history     Appearance: poorly groomed and awake, fatigued. Lying in bed with covers around him  Attitude:  somewhat cooperative  Eye Contact:  Poor, eyes closed for large portion of interview  Mood:  \"fine\"  Affect:  mood congruent, intensity is blunted and restricted range  Speech:  paucity of speech and mumbling  Language: fluent and intact in English  Psychomotor, Gait, " Musculoskeletal:  no evidence of tardive dyskinesia, dystonia, or tics  Throught Process:  linear and goal oriented  Associations:  no loose associations  Thought Content:  no evidence of suicidal ideation or homicidal ideation and paranoia present  Insight:  fair  Judgement:  fair  Oriented to:  time, person, and place  Attention Span and Concentration:  fair  Recent and Remote Memory:  intact  Fund of Knowledge:  appropriate    Clinical Global Impressions  First:    Most recent:             Precautions:     Behavioral Orders   Procedures     Assault precautions     Code 1 - Restrict to Unit     Elopement precautions     Routine Programming     As clinically indicated     Status 15     Every 15 minutes.     Suicide precautions     Patients on Suicide Precautions should have a Combination Diet ordered that includes a Diet selection(s) AND a Behavioral Tray selection for Safe Tray - with utensils, or Safe Tray - NO utensils            Diagnoses:     Psychosis, unspecified (substance induced vs primary psychotic illness)  Depressive disorder, unspecified (substance induced vs MDD)   PTSD  Anxiety, unspecified  Stimulant Use Disorder, cocaine and amphetamine type, severe  Cannabis Use Disorder, severe  Nicotine Use Disorder, moderate  Extensive history of malingering per chart review  R/O Antisocial personality disorder  Type II Diabetes  Diabetic peripheral neuropathy  HTN         Assessment & Plan:     Assessment:  This patient is a 60 year old -American/Black male with history PTSD, depression, psychosis, polysubstance use (primarily cocaine) who presented to [facility] ED with active SI and plan to overdose or run into traffic, severe symptoms of psychosis and aggressive behavior toward others in context of medication non-adherence, homelessness, methamphetamine and cocaine use/intoxication.     The above is in the context of multiple hospitalizations for similar symptoms. Current symptoms are consistent  with unspecified psychosis and depression, unclear if secondary to substances vs primary disorders.  Petition for commitment with [facility] was filed at outside ED. Patient will likely benefit from CD treatment upon discharge.    Hospital summary: Initiated zyprexa on day of admission for treatment of psychosis and mood, 10mg BID. Started prozac 10mg on 5/10 due to symptoms of depression, history of mood improvement on this medication. Initially during hospital stay had nausea and vomiting thought 2/2 withdrawal syndrome, and after nausea had resolved, had persistent decreased appetite, and was monitored for fluid intake/output, particularly given diabetes diagnosis.     Target psychiatric symptoms and interventions:  #Unspecified psychosis (substance-induced vs schizophrenia vs schizoaffective disorder)  #Unspecified depressive disorder (MDD vs substance-induced mood disorder)  #stimulant use disorder  #stimulant use withdrawal  -zyprexa 10mg BID  -prozac 10mg daily    Risks, benefits, and alternatives discussed at length with patient.     Acute Medical Problems and Treatments:   #nausea and vomiting  -medicine consult: thought most likely 2/2 withdrawal syndrome  -symptomatic treatment (zofran PRN)  -encourage fluids  -monitor Is/Os    #positive TB test (Nov 2020)  -medicine contacted, awaiting recommendations    Chronic conditions:  #DMII  #HTN  -medicine consulted, PTA medications continued      Behavioral/Psychological/Social:  - Encourage unit programming    Safety:  - Continue precautions as noted above  - Status 15 minute checks    Legal Status: 72 hour hold    Disposition Plan   Reason for ongoing admission: poses an imminent risk to self and is unable to care for self due to severe psychosis or artem  Discharge location: Chemical dependency treatment facility  Discharge Medications: not ordered  Follow-up Appointments: not scheduled    Susaan Szymanski MD  PGY-2    Patient seen and staffed with attending  psychiatrist, Emilee Rao MD

## 2021-05-11 NOTE — PLAN OF CARE
"Night Shift Summary (5/10/21 into 05/11/21)    Pt received in room awake at start of shift. Out for water shortly after. Affect flat. Pt appeared calm and guarded. Writer re-introduced self, commented that withdrawal assessment orders had been discontinued so writer wouldn't have to wake pt up this shift. Pt replied, \"Okay.\" Writer requested that pt let writer know if he needs anything. Pt observed drinking a total of one cup of water this shift. Reported voiding x 1. Appears to have slept 5.5 hours.    Suicide, Assault, Elopement and Withdrawal precautions. Any related events noted above.     Will continue to monitor and assess.         Problem: Behavioral Health Plan of Care  Goal: Absence of New-Onset Illness or Injury  Outcome: Improving     Problem: Sleep Disturbance  Goal: Adequate Sleep/Rest  Outcome: Improving     "

## 2021-05-12 LAB — GLUCOSE BLDC GLUCOMTR-MCNC: 200 MG/DL (ref 70–99)

## 2021-05-12 PROCEDURE — 250N000013 HC RX MED GY IP 250 OP 250 PS 637: Performed by: STUDENT IN AN ORGANIZED HEALTH CARE EDUCATION/TRAINING PROGRAM

## 2021-05-12 PROCEDURE — 999N001017 HC STATISTIC GLUCOSE BY METER IP

## 2021-05-12 PROCEDURE — 250N000013 HC RX MED GY IP 250 OP 250 PS 637: Performed by: PHYSICIAN ASSISTANT

## 2021-05-12 PROCEDURE — 124N000002 HC R&B MH UMMC

## 2021-05-12 PROCEDURE — 250N000013 HC RX MED GY IP 250 OP 250 PS 637: Performed by: PSYCHIATRY & NEUROLOGY

## 2021-05-12 RX ADMIN — OLANZAPINE 10 MG: 10 TABLET, ORALLY DISINTEGRATING ORAL at 08:39

## 2021-05-12 RX ADMIN — LISINOPRIL 5 MG: 5 TABLET ORAL at 08:39

## 2021-05-12 RX ADMIN — FLUOXETINE 10 MG: 10 CAPSULE ORAL at 08:39

## 2021-05-12 RX ADMIN — METFORMIN HYDROCHLORIDE 1000 MG: 500 TABLET ORAL at 17:43

## 2021-05-12 RX ADMIN — METFORMIN HYDROCHLORIDE 500 MG: 500 TABLET, FILM COATED ORAL at 08:38

## 2021-05-12 ASSESSMENT — ACTIVITIES OF DAILY LIVING (ADL)
LAUNDRY: UNABLE TO COMPLETE
ORAL_HYGIENE: INDEPENDENT
DRESS: SCRUBS (BEHAVIORAL HEALTH);INDEPENDENT
HYGIENE/GROOMING: INDEPENDENT

## 2021-05-12 NOTE — PLAN OF CARE
Problem: Behavioral Health Plan of Care  Goal: Absence of New-Onset Illness or Injury  Outcome: No Change     Problem: Behavioral Health Plan of Care  Goal: Optimized Coping Skills in Response to Life Stressors  Outcome: No Change     Pt appeared to be alert and oriented to place and self throughout shift.  He remained in his room for a majority of the day, only visible in the milieu for ice from the machine in the lounge.  Pt appears to be slightly untidy, and unable to state when he has last showered or groomed.  He speech is clear and coherent.  Pt drank two Ensures for breakfast, however did not touch any of the rest of his meal.  He was med compliant and agreeable to vital signs, but refused BG check.  Pt Had court at 1100 via Zoom.  He denied SI/HI/SIB.  Pt denied any psychosis.  He denied any acute physical health concerns, pain, or side effects from medications.

## 2021-05-12 NOTE — PLAN OF CARE
"  Problem: Behavioral Health Plan of Care  Goal: Plan of Care Review  Outcome: No Change  Flowsheets (Taken 2021)  Plan of Care Reviewed With: patient  Patient Agreement with Plan of Care: agrees     Pt observed to be isolative, withdrawn. Spent most of his time in his room. Presents with blunted affect, guarded and irritable. He was due for chest Xray in which he complied and had it done. Pt also allowed writer to check blood glucose. B mg/dl. Initially refused to take Metformin but later on agreed to take it. Pt was encouraged to take in food. Pt was accepting with the only the pudding and the Ensure. Able to consume 240cc of Ensure and took approximately 25% of the pudding.  Pt denies SI/SIB/HI. Pt denies experiencing any type of hallucinations. Denies having anxiety but stated he does have depression, pt unable to rate it. Verbalized \"I am really  depressed. It is there but I don't know the extent\".   Pt still reports having intermittent stomach aches. Declined to take PRN medication.   Due medications given. Denies experiencing side effects. Pt refused HS blood sugar checks. Verbalized \"No. No more checks please. I want to sleep\". Re approached but still refused.   Needs attended to. On suicide, assault, withdrawal and elopement precautions. No further concerns noted as of this time.   "

## 2021-05-12 NOTE — PLAN OF CARE
Problem: Sleep Disturbance  Goal: Adequate Sleep/Rest  Outcome: No Change     Pt slept for 5.75 hours. No concerns noted.

## 2021-05-12 NOTE — PROGRESS NOTES
Brief Medicine Note:    IM peripherally following workup after +quanteriferon gold 11/2020. Case was d/w ID 5/11 who suggested CXR and HIV. CXR clear, HIV NR. Findings d/w ID who suggested OP follow up with their team. Please contact medicine if patient develops new or concerning infectious symptoms. OP ID follow up consult placed in discharge orders. Medicine will sign off    Rachel Cheatham PA-C  Internal Medicine [facility]  [phone]

## 2021-05-12 NOTE — PLAN OF CARE
"  Problem: Behavioral Health Plan of Care  Goal: Plan of Care Review  Outcome: No Change  Flowsheets (Taken 5/12/2021 1746)  Plan of Care Reviewed With: patient  Patient Agreement with Plan of Care: agrees    Pt was unpleasant on approach.He was upset and agitated about staff going in his room for assessment.\"I can't sleep, you people keep coming in my room \" he says. Pt was dismissive and he responded with the answer no before writer could complete a sentence.He endorsed depression and pain, He c/o knee pain but declined intervention. Pt denied SI,HI,SIB,AVH,anxiety and medication concerns.His appetite is poor(Had only Ensure from his tray).No c/o stomach problems this shift. He hates the food .Pt does not remember his last BM date.His goal this evening was to sleep.Pt maintained a flat affect,and  was irritable the whole shift. He instructed staff to stay out of his room otherwise he will explode.He was not happy also because his request for his personal tooth brush,tooth paste and deodorant was deferred to tomorrow.He was withdrawn and isolative to his room. before supper.Pt took his Metformin but refused his HS Zyprexa, blood glucose monitoring and prn Senna for constipation.Pt refused to have his BP and temp taken. HR 86.     "

## 2021-05-12 NOTE — PROGRESS NOTES
"Pt approached staff asking for personal toiletries from his locker.  Per charge RN, pt was to use unit items until obtaining a doctors order tomorrow morning.  Pt was offered unit items, however he declined.  Pt immediately became agitated, pounding on the lounge chair and cursing at staff.  He then stated \"don't any of y'all mother fuckers wake me up for nothing.  I swear to god you won't like what happens\".  Pt returned to room slamming his door, and can be heard cursing to himself by staff during rounds.  "

## 2021-05-12 NOTE — PLAN OF CARE
Work Completed: attended team meeting and reviewed chart notes    Pt was present for the court proceedings. He reports being very fatigued and has difficulty sleeping.     Discharge plan or goal: possible IRTs placement                Barriers to discharge: pt is in the court process and needs further stabillization

## 2021-05-12 NOTE — PROGRESS NOTES
[facility], [place]   Psychiatric Progress Note  Hospital Day: 5        Interim History:   The patient's care was discussed with the treatment team during the daily team meeting and/or staff's chart notes were reviewed.  Staff reported Brody primarily isolated to room, although visible in the milieu for phone calls. Appears untidy, does not attend to ADLs without staff prompting. Observed eating ~50% of meals (more so than days prior). Agreeable to completing CXR, vitals, some blood sugar checks (more than prior days). Did report intermittent stomach aches.     He declined interview today due to feeling very tired, not sleeping well last night. No specific questions or concerns, agreeable to speaking tomorrow AM.         Medications:       FLUoxetine  10 mg Oral Daily     lisinopril  5 mg Oral Daily     metFORMIN  1,000 mg Oral Daily with supper     [START ON 5/17/2021] metFORMIN  1,000 mg Oral BID w/meals     metFORMIN  500 mg Oral Daily with breakfast     OLANZapine zydis  10 mg Oral BID          Allergies:   No Known Allergies       Labs:     Recent Results (from the past 24 hour(s))   Glucose by meter    Collection Time: 05/11/21  5:15 PM   Result Value Ref Range    Glucose 209 (H) 70 - 99 mg/dL          Psychiatric Examination:     /72   Pulse 81   Temp 98.1  F (36.7  C) (Oral)   Resp 16   SpO2 98%   Weight is 0 lbs 0 oz  There is no height or weight on file to calculate BMI.    Weight over time:  There were no vitals filed for this visit.    Orthostatic Vitals       Most Recent      Lying Orthostatic /66 05/09 0900    Lying Orthostatic Pulse (bpm) 95 05/09 0900    Sitting Orthostatic /74 05/10 0842    Sitting Orthostatic Pulse (bpm) 115 05/10 0842            Cardiometabolic risk assessment. 05/10/21      Reviewed patient profile for cardiometabolic risk factors    Date taken /Value  REFERENCE RANGE   Abdominal Obesity  (Waist Circumference)   See nursing  "flowsheet Women ?35 in (88 cm)   Men ?40 in (102 cm)      Triglycerides  Triglycerides   Date Value Ref Range Status   09/28/2020 95 <150 mg/dL Final       ?150 mg/dL (1.7 mmol/L) or current treatment for elevated triglycerides   HDL cholesterol  HDL Cholesterol   Date Value Ref Range Status   09/28/2020 54 >39 mg/dL Final   ]   Women <50 mg/dL (1.3 mmol/L) in women or current treatment for low HDL cholesterol  Men <40 mg/dL (1 mmol/L) in men or current treatment for low HDL cholesterol     Fasting plasma glucose (FPG) Lab Results   Component Value Date     05/07/2021      FPG ?100 mg/dL (5.6 mmol/L) or treatment for elevated blood glucose   Blood pressure  BP Readings from Last 3 Encounters:   05/11/21 124/72   11/19/20 (!) 149/89   10/30/20 (!) 141/80    Blood pressure ?130/85 mmHg or treatment for elevated blood pressure   Family History  See family history     Appearance: poorly groomed and awake, fatigued. Lying in bed with covers around him  Attitude:  somewhat cooperative  Eye Contact:  Poor  Mood:  \"fine\"  Affect:  mood congruent, intensity is blunted and restricted range  Speech:  paucity of speech and mumbling  Language: fluent and intact in English  Psychomotor, Gait, Musculoskeletal:  no evidence of tardive dyskinesia, dystonia, or tics  Throught Process:  linear and goal oriented  Associations:  no loose associations  Thought Content:  no evidence of suicidal ideation or homicidal ideation and paranoia present  Insight:  fair  Judgement:  fair  Oriented to:  time, person, and place  Attention Span and Concentration:  fair  Recent and Remote Memory:  intact  Fund of Knowledge:  appropriate    Clinical Global Impressions  First:    Most recent:             Precautions:     Behavioral Orders   Procedures     Assault precautions     Code 1 - Restrict to Unit     Code 2     FOR CXR on 5/11/21     Elopement precautions     Routine Programming     As clinically indicated     Status 15     Every 15 " minutes.     Suicide precautions     Patients on Suicide Precautions should have a Combination Diet ordered that includes a Diet selection(s) AND a Behavioral Tray selection for Safe Tray - with utensils, or Safe Tray - NO utensils            Diagnoses:     Psychosis, unspecified (substance induced vs primary psychotic illness)  Depressive disorder, unspecified (substance induced vs MDD)   PTSD  Anxiety, unspecified  Stimulant Use Disorder, cocaine and amphetamine type, severe  Cannabis Use Disorder, severe  Nicotine Use Disorder, moderate  Extensive history of malingering per chart review  R/O Antisocial personality disorder  Type II Diabetes  Diabetic peripheral neuropathy  HTN         Assessment & Plan:     Assessment:  This patient is a 60 year old -American/Black male with history PTSD, depression, psychosis, polysubstance use (primarily cocaine) who presented to [facility] ED with active SI and plan to overdose or run into traffic, severe symptoms of psychosis and aggressive behavior toward others in context of medication non-adherence, homelessness, methamphetamine and cocaine use/intoxication.     The above is in the context of multiple hospitalizations for similar symptoms. Current symptoms are consistent with unspecified psychosis and depression, unclear if secondary to substances vs primary disorders.  Petition for commitment with [facility] was filed at outside ED. Patient will likely benefit from CD treatment upon discharge.    Hospital summary: Initiated zyprexa on day of admission for treatment of psychosis and mood, 10mg BID. Started prozac 10mg on 5/10 due to symptoms of depression, history of mood improvement on this medication. Initially during hospital stay had nausea and vomiting thought 2/2 withdrawal syndrome, and after nausea had resolved, had persistent decreased appetite, and was monitored for fluid intake/output, particularly given diabetes diagnosis.     Target psychiatric symptoms  "and interventions:  #Unspecified psychosis (substance-induced vs schizophrenia vs schizoaffective disorder)  #Unspecified depressive disorder (MDD vs substance-induced mood disorder)  #stimulant use disorder  #stimulant use withdrawal  -zyprexa 10mg BID  -prozac 10mg daily    Risks, benefits, and alternatives discussed at length with patient.     Acute Medical Problems and Treatments:   #nausea and vomiting  -medicine consult: thought most likely 2/2 withdrawal syndrome  -symptomatic treatment (zofran PRN)  -encourage fluids  -monitor Is/Os    #positive quantiferon gold TB test (Nov 2020)  -medicine consult: CXR & HIV, both negative. Recommend f/u with infectious disease as outpatient, order placed for discharge    Chronic conditions:  #DMII  #HTN  -medicine consulted, PTA medications continued      Behavioral/Psychological/Social:  - Encourage unit programming    Safety:  - Continue precautions as noted above  - Status 15 minute checks    Legal Status: court hold    Disposition Plan   Reason for ongoing admission: poses an imminent risk to self and is unable to care for self due to severe psychosis or artem  Discharge location: Chemical dependency treatment facility  Discharge Medications: not ordered  Follow-up Appointments: not scheduled    Susana Szymanski MD  PGY-2    Patient staffed with attending psychiatrist, Dr Neff     This patient has been seen and evaluated by me, Sam Neff MD. I have discussed this patient with the resident and I agree with the findings and plan in this note. I have reviewed today's vital signs, medications, labs and imaging\".                 "

## 2021-05-13 LAB — GLUCOSE BLDC GLUCOMTR-MCNC: 152 MG/DL (ref 70–99)

## 2021-05-13 PROCEDURE — 99207 PR CDG-CODE CATEGORY CHANGED: CPT | Performed by: PHYSICIAN ASSISTANT

## 2021-05-13 PROCEDURE — 250N000013 HC RX MED GY IP 250 OP 250 PS 637: Performed by: STUDENT IN AN ORGANIZED HEALTH CARE EDUCATION/TRAINING PROGRAM

## 2021-05-13 PROCEDURE — 99232 SBSQ HOSP IP/OBS MODERATE 35: CPT | Performed by: PHYSICIAN ASSISTANT

## 2021-05-13 PROCEDURE — 250N000013 HC RX MED GY IP 250 OP 250 PS 637: Performed by: PHYSICIAN ASSISTANT

## 2021-05-13 PROCEDURE — 250N000013 HC RX MED GY IP 250 OP 250 PS 637: Performed by: PSYCHIATRY & NEUROLOGY

## 2021-05-13 PROCEDURE — 99232 SBSQ HOSP IP/OBS MODERATE 35: CPT | Mod: GC | Performed by: PSYCHIATRY & NEUROLOGY

## 2021-05-13 PROCEDURE — 124N000002 HC R&B MH UMMC

## 2021-05-13 PROCEDURE — 999N001017 HC STATISTIC GLUCOSE BY METER IP

## 2021-05-13 RX ORDER — OLANZAPINE 5 MG/1
5 TABLET ORAL EVERY MORNING
Status: DISCONTINUED | OUTPATIENT
Start: 2021-05-14 | End: 2021-05-24 | Stop reason: HOSPADM

## 2021-05-13 RX ORDER — OLANZAPINE 15 MG/1
15 TABLET, ORALLY DISINTEGRATING ORAL AT BEDTIME
Status: DISCONTINUED | OUTPATIENT
Start: 2021-05-13 | End: 2021-05-24 | Stop reason: HOSPADM

## 2021-05-13 RX ADMIN — METFORMIN HYDROCHLORIDE 500 MG: 500 TABLET, FILM COATED ORAL at 08:24

## 2021-05-13 RX ADMIN — OLANZAPINE 10 MG: 10 TABLET, ORALLY DISINTEGRATING ORAL at 08:24

## 2021-05-13 RX ADMIN — METFORMIN HYDROCHLORIDE 1000 MG: 500 TABLET ORAL at 17:32

## 2021-05-13 RX ADMIN — LISINOPRIL 5 MG: 5 TABLET ORAL at 08:24

## 2021-05-13 RX ADMIN — ALUMINUM HYDROXIDE, MAGNESIUM HYDROXIDE, AND SIMETHICONE 30 ML: 200; 200; 20 SUSPENSION ORAL at 18:29

## 2021-05-13 RX ADMIN — OLANZAPINE 15 MG: 15 TABLET, ORALLY DISINTEGRATING ORAL at 22:23

## 2021-05-13 RX ADMIN — FLUOXETINE 10 MG: 10 CAPSULE ORAL at 08:24

## 2021-05-13 RX ADMIN — TRAZODONE HYDROCHLORIDE 50 MG: 50 TABLET ORAL at 02:13

## 2021-05-13 ASSESSMENT — ACTIVITIES OF DAILY LIVING (ADL)
DRESS: SCRUBS (BEHAVIORAL HEALTH)
DRESS: SCRUBS (BEHAVIORAL HEALTH)
ORAL_HYGIENE: INDEPENDENT
HYGIENE/GROOMING: INDEPENDENT
ORAL_HYGIENE: INDEPENDENT
LAUNDRY: UNABLE TO COMPLETE
HYGIENE/GROOMING: INDEPENDENT

## 2021-05-13 NOTE — CONSULTS
"Brief Consult    Contacted for consult regarding patient complained to psychiatrist about abdominal pain    On exam patient notes pain \"in my stomach\" and does not offer a specific location, but when suggested \"is it near your belly button\" he answers yes and states it is dull 6/10, non-radiating and without known agrevating factors and alleviated with sleeping.      Denies F/C, N/V, bleeding (including hematuria, melena, hematochezia, hemoptysis) or other complaitns     Today's vital signs, medications, and nursing notes were reviewed. Labs reviewed.     /73   Pulse 86   Temp 97.1  F (36.2  C)   Resp 16   SpO2 99%   General: A&O. NAD. Resting on right side in bed under covers  Cv: refused  PULM: nonlabored breathing, refused further exam  HEENT: NC AT  GI: refused  BACK: refused  Extremities: refused      A/P:  Periumbilical abdominal pain  No red-flag signs or symptoms and is not c/w acute abdomen  - continue to trend vitals  - encourage po hydration   - ensure bowel regularity  - bladder scan prn suprapubic pain or lack of void >6h while awake  - hold off on labs given lack of signs/symptoms    DM2  Hyperglycemia  Recent Labs   Lab 05/12/21  1735 05/11/21  1715 05/11/21  0813 05/10/21  0824 05/09/21  1704 05/08/21  2000 05/07/21  1625 05/07/21  1625   GLC  --   --   --   --   --   --   --  234*   * 209* 176* 192* 206* 187*   < >  --     < > = values in this interval not displayed.   (please see also note by Luisana Cheatham PA-C 5/12/21)  - ok to decrease POCT glucose checks to BID  - continue metformin    Medicine will sign off at this time, please call with questions or concerns    Laurence Serrano PA-C  [facility]  Contact information available via [facility] Paging/Directory      "
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
## 2021-05-13 NOTE — PLAN OF CARE
Assessment/Intervention/Current Symtoms and Care Coordination  -Pt's care discussed with treatment team in daily team meeting and chart notes were reviewed  [facility]   Final hearin/20 @ 9am     Writer spoke to [employer] today from [facility] regarding neuroleptic hearing.       Discharge Plan or Goal  Still determining discharge plan for this patient. He will likely need a CD evaluation and referrals.      Barriers to Discharge   Petition for commitment through [facility]     Referral Status  No referrals made at this time, unknown plan.      Legal Status   Court hold

## 2021-05-13 NOTE — PROGRESS NOTES
"[facility], [place]   Psychiatric Progress Note  Hospital Day: 6        Interim History:   The patient's care was discussed with the treatment team during the daily team meeting and/or staff's chart notes were reviewed.  Staff reported that he was primarily isolated to his room, consumed ensure, but no other food items. Did drink some crystal light. Appeared untidy, unclear when last showered. Reported knee pain, did not want any help for it. Very agitated (cursing at staff, pounding on lounge chair) when told he could not have personal toiletries without doctors order. Declined evening medications. Woke up around 2am with feeling tired but restless, trazodone helpful.     Upon interview today, reports that he had trouble sleeping again at night, trazodone not helpful. Seroquel has been helpful in the past. Agreeable to changing zyprexa dosing to help with sleep. Mood is \"not great\" due to lack of sleep. No current SI, reports last time felt suicidal \"a few days ago.\" Reports constant abdominal pain and continued lack of appetite. Last BM last night, was a \"normal\" BM. Agreeable to IM seeing him for abdominal pain. Asked where he's going next after the hospital, doesn't have anywhere in particular he's hoping to go, just wondering as he's feeling cooped up in the hospital.          Medications:       FLUoxetine  10 mg Oral Daily     lisinopril  5 mg Oral Daily     metFORMIN  1,000 mg Oral Daily with supper     [START ON 5/17/2021] metFORMIN  1,000 mg Oral BID w/meals     metFORMIN  500 mg Oral Daily with breakfast     OLANZapine zydis  10 mg Oral BID          Allergies:   No Known Allergies       Labs:     Recent Results (from the past 24 hour(s))   Glucose by meter    Collection Time: 05/12/21  5:35 PM   Result Value Ref Range    Glucose 200 (H) 70 - 99 mg/dL          Psychiatric Examination:     /73   Pulse 86   Temp 97.1  F (36.2  C)   Resp 16   SpO2 99%   Weight is 0 lbs 0 " "oz  There is no height or weight on file to calculate BMI.    Weight over time:  There were no vitals filed for this visit.    Orthostatic Vitals       Most Recent      Lying Orthostatic /66 05/09 0900    Lying Orthostatic Pulse (bpm) 95 05/09 0900    Sitting Orthostatic /74 05/10 0842    Sitting Orthostatic Pulse (bpm) 115 05/10 0842            Cardiometabolic risk assessment. 05/10/21      Reviewed patient profile for cardiometabolic risk factors    Date taken /Value  REFERENCE RANGE   Abdominal Obesity  (Waist Circumference)   See nursing flowsheet Women ?35 in (88 cm)   Men ?40 in (102 cm)      Triglycerides  Triglycerides   Date Value Ref Range Status   09/28/2020 95 <150 mg/dL Final       ?150 mg/dL (1.7 mmol/L) or current treatment for elevated triglycerides   HDL cholesterol  HDL Cholesterol   Date Value Ref Range Status   09/28/2020 54 >39 mg/dL Final   ]   Women <50 mg/dL (1.3 mmol/L) in women or current treatment for low HDL cholesterol  Men <40 mg/dL (1 mmol/L) in men or current treatment for low HDL cholesterol     Fasting plasma glucose (FPG) Lab Results   Component Value Date     05/07/2021      FPG ?100 mg/dL (5.6 mmol/L) or treatment for elevated blood glucose   Blood pressure  BP Readings from Last 3 Encounters:   05/12/21 111/73   11/19/20 (!) 149/89   10/30/20 (!) 141/80    Blood pressure ?130/85 mmHg or treatment for elevated blood pressure   Family History  See family history     Appearance: poorly groomed and awake, fatigued. Lying in bed with covers around him  Attitude:  somewhat cooperative  Eye Contact:  Poor  Mood:  \"fine\"  Affect:  mood congruent, intensity is blunted and restricted range  Speech:  paucity of speech and mumbling  Language: fluent and intact in English  Psychomotor, Gait, Musculoskeletal:  no evidence of tardive dyskinesia, dystonia, or tics  Throught Process:  linear and goal oriented  Associations:  no loose associations  Thought Content:  no evidence " of suicidal ideation or homicidal ideation and paranoia present  Insight:  fair  Judgement:  fair  Oriented to:  time, person, and place  Attention Span and Concentration:  fair  Recent and Remote Memory:  intact  Fund of Knowledge:  appropriate    Clinical Global Impressions  First: 5/3 5/13/2021     Most recent:             Precautions:     Behavioral Orders   Procedures     Assault precautions     Code 1 - Restrict to Unit     Code 2     FOR CXR on 5/11/21     Elopement precautions     Routine Programming     As clinically indicated     Status 15     Every 15 minutes.     Suicide precautions     Patients on Suicide Precautions should have a Combination Diet ordered that includes a Diet selection(s) AND a Behavioral Tray selection for Safe Tray - with utensils, or Safe Tray - NO utensils            Diagnoses:     Psychosis, unspecified (substance induced vs primary psychotic illness)  Depressive disorder, unspecified (substance induced vs MDD)   PTSD  Anxiety, unspecified  Stimulant Use Disorder, cocaine and amphetamine type, severe  Cannabis Use Disorder, severe  Nicotine Use Disorder, moderate  Extensive history of malingering per chart review  R/O Antisocial personality disorder  Type II Diabetes  Diabetic peripheral neuropathy  HTN         Assessment & Plan:     Assessment:  This patient is a 60 year old -American/Black male with history PTSD, depression, psychosis, polysubstance use (primarily cocaine) who presented to [facility] ED with active SI and plan to overdose or run into traffic, severe symptoms of psychosis and aggressive behavior toward others in context of medication non-adherence, homelessness, methamphetamine and cocaine use/intoxication.     The above is in the context of multiple hospitalizations for similar symptoms. Current symptoms are consistent with unspecified psychosis and depression, unclear if secondary to substances vs primary disorders.  Petition for commitment with [facility]  was filed at outside ED. Patient will likely benefit from CD treatment upon discharge.    Hospital summary: Initiated zyprexa on day of admission for treatment of psychosis and mood, 10mg BID. Started prozac 10mg on 5/10 due to symptoms of depression, history of mood improvement on this medication. Initially during hospital stay had nausea and vomiting thought 2/2 withdrawal syndrome, and after nausea had resolved, had persistent decreased appetite, and was monitored for fluid intake/output, particularly given diabetes diagnosis. Due to continued decreased appetite + abdominal pain, internal medicine consulted on 5/13. Due to insomnia, changed zyprexa dosing from 10mg BID to 5mg + 15mg at bedtime.     Target psychiatric symptoms and interventions:  #Unspecified psychosis (substance-induced vs schizophrenia vs schizoaffective disorder)  #Unspecified depressive disorder (MDD vs substance-induced mood disorder)  #stimulant use disorder  #stimulant use withdrawal  -zyprexa 5mg qam + 15mg at betime  -prozac 10mg daily    Risks, benefits, and alternatives discussed at length with patient.     Acute Medical Problems and Treatments:   #nausea and vomiting  #abdominal pain  #anorexia  -medicine consult x2: thought most likely 2/2 withdrawal syndrome initially. Repeat medicine consult on 5/13 due to continued abdominal pain, anorexia.  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 Recommend f/u with infectious disease as outpatient, order placed for discharge    Chronic conditions:  #DMII  #HTN  -medicine consulted, PTA medications continued      Behavioral/Psychological/Social:  - Encourage unit programming    Safety:  - Continue precautions as noted above  - Status 15 minute checks    Legal Status: court hold    Disposition Plan   Reason for ongoing admission: poses an imminent risk to self and is unable  "to care for self due to severe psychosis or artem  Discharge location: [facility] dependency treatment facility  Discharge Medications: not ordered  Follow-up Appointments: not scheduled    Susana Szymanski MD  PGY-2    Patient staffed with attending psychiatrist, Dr Neff     This patient has been seen and evaluated by me, Sam Neff MD. I have discussed this patient with the resident and I agree with the findings and plan in this note. I have reviewed today's vital signs, medications, labs and imaging\".                       "

## 2021-05-13 NOTE — PLAN OF CARE
Pt has isolated to his room all day, napping off and on. He declined all therapeutic groups and activities. Affect flat/blunted. He was calm and pleasant on approach, but withdrawn and quiet. Participates minimally in check in with RN writer. He denies SI/SIB and HI. Denies hallucinations. He declined BG check at breakfast and lunch. MD aware. Per pt, he does not check BG qid at home and only uses Metformin, so is frustrated that he is being asked to complete BG so often. IM was consulted by Psychiatry. Pt was compliant with all scheduled medications and denies any side effects. Appetite fair. He ate a muffin and Boost for breakfast, and had about 50% of lunch tray. Pt is on I & Os.

## 2021-05-14 LAB — GLUCOSE BLDC GLUCOMTR-MCNC: 164 MG/DL (ref 70–99)

## 2021-05-14 PROCEDURE — 250N000013 HC RX MED GY IP 250 OP 250 PS 637: Performed by: STUDENT IN AN ORGANIZED HEALTH CARE EDUCATION/TRAINING PROGRAM

## 2021-05-14 PROCEDURE — 999N001017 HC STATISTIC GLUCOSE BY METER IP

## 2021-05-14 PROCEDURE — 250N000013 HC RX MED GY IP 250 OP 250 PS 637: Performed by: PSYCHIATRY & NEUROLOGY

## 2021-05-14 PROCEDURE — H0001 ALCOHOL AND/OR DRUG ASSESS: HCPCS

## 2021-05-14 PROCEDURE — 99232 SBSQ HOSP IP/OBS MODERATE 35: CPT | Mod: GC | Performed by: PSYCHIATRY & NEUROLOGY

## 2021-05-14 PROCEDURE — 124N000002 HC R&B MH UMMC

## 2021-05-14 PROCEDURE — 250N000013 HC RX MED GY IP 250 OP 250 PS 637: Performed by: PHYSICIAN ASSISTANT

## 2021-05-14 PROCEDURE — 99207 PR CDG-MDM COMPONENT: MEETS MODERATE - UP CODED: CPT | Performed by: PSYCHIATRY & NEUROLOGY

## 2021-05-14 RX ADMIN — OLANZAPINE 15 MG: 15 TABLET, ORALLY DISINTEGRATING ORAL at 20:48

## 2021-05-14 RX ADMIN — TRAZODONE HYDROCHLORIDE 50 MG: 50 TABLET ORAL at 01:27

## 2021-05-14 RX ADMIN — Medication: at 18:33

## 2021-05-14 RX ADMIN — METFORMIN HYDROCHLORIDE 1000 MG: 500 TABLET ORAL at 17:44

## 2021-05-14 ASSESSMENT — ACTIVITIES OF DAILY LIVING (ADL)
DRESS: SCRUBS (BEHAVIORAL HEALTH)
ORAL_HYGIENE: PROMPTS
DRESS: SCRUBS (BEHAVIORAL HEALTH);INDEPENDENT
LAUNDRY: UNABLE TO COMPLETE
HYGIENE/GROOMING: PROMPTS
ORAL_HYGIENE: INDEPENDENT
HYGIENE/GROOMING: INDEPENDENT

## 2021-05-14 NOTE — PLAN OF CARE
Problem: Sleep Disturbance  Goal: Adequate Sleep/Rest  Outcome: No Change     Pt was in bed at the beginning of the shift. Pt came out at 0120H and requested for PRN medication for insomnia. Writer offered PRN trazodone. Pt stated that he prefers Seroquel but took the Trazodone before going back to bed. Pt slept for 4 hours.
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
## 2021-05-14 NOTE — PLAN OF CARE
Problem: Behavioral Health Plan of Care  Goal: Optimized Coping Skills in Response to Life Stressors  Outcome: No Change     Problem: Behavioral Health Plan of Care  Goal: Develops/Participates in Therapeutic [place] to Support Successful Transition  Outcome: No Change     Pt presented as alert and oriented to place and self throughout shift.  He was primarily isolative to his room throughout the day, and did not participate in daily groups.  Pt appeared to lack basic ADLs without staff prompts, and speech was quiet but clear and coherent.  Pt refused most of meals (ate cereal at breakfast) and medications, as well as any PRNs.  He refused VS and BG checks at breakfast and lunch.  Pt also participated minimally in nursing assessment questions.  He denied SI/HI/SIB.  Pt denied psychosis.

## 2021-05-14 NOTE — PLAN OF CARE
Assessment/Intervention/Current Symtoms and Care Coordination  -Pt's care discussed with treatment team in daily team meeting and chart notes were reviewed  [facility] Final commitment hearing is 5/20 @ 9am     - pt is doing his Rule 25 with  currently.      Discharge Plan or Goal  Still determining discharge plan for this patient. He will likely need a CD evaluation and referrals.      Barriers to Discharge   Petition for commitment through [facility]     Referral Status  No referrals made at this time, unknown plan.      Legal Status   Court hold

## 2021-05-14 NOTE — PROGRESS NOTES
Pt was seen in [facility] socializing with staff ands peers in the afternoon.  He talked with writer about some of his hx of being homeless, living on the street, and his family.  Pt also was agreeable to sign papers to get the process in motion for CD tx at [facility] [place].

## 2021-05-14 NOTE — CONSULTS
5/14/2021    CD consult completed.   Pt would like to return to [facility] for treatment.     Recommendations:   1. Abstain from all non-prescribed mood-altering substances  2. Take all medications as prescribed  3. Enter and complete a co-occurring residential or inpatient treatment program  4. Follow all recommendations upon discharge from treatment. Recommendations may include, but are not limited to: extended treatment, outpatient treatment and/or sober housing.  5. Follow all recommendations of your medical providers  6. Follow all recommendations/conditions of Commitment    Referrals:  [facility] Team for Referrals  Phone: [phone]  Fax: [phone]  [facility]  [address], [place] [address]  Phone: [phone]  fax: [phone]    EDWIN consult completed by: WES Goldman.  Phone Number: [phone]   E-mail Address: [email or web address] Mental Health and Addiction Services Evaluation Department  [address] [place] [address]

## 2021-05-14 NOTE — PROGRESS NOTES
"[facility], [place]   Psychiatric Progress Note  Hospital Day: 7        Interim History:   The patient's care was discussed with the treatment team during the daily team meeting and/or staff's chart notes were reviewed.  Staff reported that he was isolative to room all day, napping on and off. Did not participate in groups. Declined BG checks. Appetite OK, ate ~50% of meals. Slept for 4 hours overnight, requested PRN for insomnia around 1AM.     Upon interview today, difficult to rouse, feeling very tired, did not sleep very well overnight. Continues to have OK appetite, not interested in hospital food. Denies side effects from medications. Reports minimal abdominal pain, improved from yesterday. Denies SI/HI. Denies hallucinations. Asks when he's leaving--informed of remaining court process, and asked about CD treatment after hospital stay. He reports that \"I thought that was the plan,\" and was agreeable to doing a Rule 25 while in the hospital. No further questions or concerns.          Medications:       FLUoxetine  10 mg Oral Daily     lisinopril  5 mg Oral Daily     metFORMIN  1,000 mg Oral Daily with supper     [START ON 5/17/2021] metFORMIN  1,000 mg Oral BID w/meals     metFORMIN  500 mg Oral Daily with breakfast     OLANZapine  5 mg Oral QAM     OLANZapine zydis  15 mg Oral At Bedtime          Allergies:   No Known Allergies       Labs:     Recent Results (from the past 24 hour(s))   Glucose by meter    Collection Time: 05/13/21  5:29 PM   Result Value Ref Range    Glucose 152 (H) 70 - 99 mg/dL          Psychiatric Examination:     /76   Pulse 87   Temp 97.8  F (36.6  C) (Tympanic)   Resp 16   SpO2 99%   Weight is 0 lbs 0 oz  There is no height or weight on file to calculate BMI.    Weight over time:  There were no vitals filed for this visit.    Orthostatic Vitals       Most Recent      Lying Orthostatic /66 05/09 0900    Lying Orthostatic Pulse (bpm) 95 05/09 " "0900    Sitting Orthostatic /74 05/10 0842    Sitting Orthostatic Pulse (bpm) 115 05/10 0842            Cardiometabolic risk assessment. 05/10/21      Reviewed patient profile for cardiometabolic risk factors    Date taken /Value  REFERENCE RANGE   Abdominal Obesity  (Waist Circumference)   See nursing flowsheet Women ?35 in (88 cm)   Men ?40 in (102 cm)      Triglycerides  Triglycerides   Date Value Ref Range Status   09/28/2020 95 <150 mg/dL Final       ?150 mg/dL (1.7 mmol/L) or current treatment for elevated triglycerides   HDL cholesterol  HDL Cholesterol   Date Value Ref Range Status   09/28/2020 54 >39 mg/dL Final   ]   Women <50 mg/dL (1.3 mmol/L) in women or current treatment for low HDL cholesterol  Men <40 mg/dL (1 mmol/L) in men or current treatment for low HDL cholesterol     Fasting plasma glucose (FPG) Lab Results   Component Value Date     05/07/2021      FPG ?100 mg/dL (5.6 mmol/L) or treatment for elevated blood glucose   Blood pressure  BP Readings from Last 3 Encounters:   05/13/21 118/76   11/19/20 (!) 149/89   10/30/20 (!) 141/80    Blood pressure ?130/85 mmHg or treatment for elevated blood pressure   Family History  See family history     Appearance: poorly groomed and awake, fatigued. Lying in bed with covers around him  Attitude:  somewhat cooperative  Eye Contact:  Poor  Mood:  \"fine\"  Affect:  mood congruent, intensity is blunted and restricted range  Speech:  paucity of speech and mumbling, mumbling improved with awakening  Language: fluent and intact in English  Psychomotor, Gait, Musculoskeletal:  no evidence of tardive dyskinesia, dystonia, or tics  Throught Process:  linear and goal oriented  Associations:  no loose associations  Thought Content:  no evidence of suicidal ideation or homicidal ideation and paranoia present  Insight:  fair  Judgement:  fair  Oriented to:  time, person, and place  Attention Span and Concentration:  fair  Recent and Remote Memory:  " intact  Fund of Knowledge:  appropriate    Clinical Global Impressions  First: 5/3 5/13/2021     Most recent:             Precautions:     Behavioral Orders   Procedures     Assault precautions     Code 1 - Restrict to Unit     Code 2     FOR CXR on 5/11/21     Elopement precautions     Routine Programming     As clinically indicated     Status 15     Every 15 minutes.     Suicide precautions     Patients on Suicide Precautions should have a Combination Diet ordered that includes a Diet selection(s) AND a Behavioral Tray selection for Safe Tray - with utensils, or Safe Tray - NO utensils            Diagnoses:     Psychosis, unspecified (substance induced vs primary psychotic illness)  Depressive disorder, unspecified (substance induced vs MDD)   PTSD  Anxiety, unspecified  Stimulant Use Disorder, cocaine and amphetamine type, severe  Cannabis Use Disorder, severe  Nicotine Use Disorder, moderate  Extensive history of malingering per chart review  R/O Antisocial personality disorder  Type II Diabetes  Diabetic peripheral neuropathy  HTN         Assessment & Plan:     Assessment:  This patient is a 60 year old -American/Black male with history PTSD, depression, psychosis, polysubstance use (primarily cocaine) who presented to [facility] ED with active SI and plan to overdose or run into traffic, severe symptoms of psychosis and aggressive behavior toward others in context of medication non-adherence, homelessness, methamphetamine and cocaine use/intoxication.     The above is in the context of multiple hospitalizations for similar symptoms. Current symptoms are consistent with unspecified psychosis and depression, unclear if secondary to substances vs primary disorders.  Petition for commitment with [facility] was filed at outside ED. Patient will likely benefit from CD treatment upon discharge.    Hospital summary: Initiated zyprexa on day of admission for treatment of psychosis and mood, 10mg BID. Started  prozac 10mg on 5/10 due to symptoms of depression, history of mood improvement on this medication. Initially during hospital stay had nausea and vomiting thought 2/2 withdrawal syndrome, and after nausea had resolved, had persistent decreased appetite, and was monitored for fluid intake/output, particularly given diabetes diagnosis. Due to continued decreased appetite + abdominal pain, internal medicine consulted on 5/13. Due to insomnia, changed zyprexa dosing from 10mg BID to 5mg + 15mg at bedtime.     Changes today:  -Rule 25/CD consult    Target psychiatric symptoms and interventions:  #Unspecified psychosis (substance-induced vs schizophrenia vs schizoaffective disorder)  #Unspecified depressive disorder (MDD vs substance-induced mood disorder)  #stimulant use disorder  #stimulant use withdrawal  -zyprexa 5mg qam + 15mg at betime  -prozac 10mg daily    Risks, benefits, and alternatives discussed at length with patient.     Acute Medical Problems and Treatments:   #nausea and vomiting  #abdominal pain  #anorexia  -medicine consult x2: thought most likely 2/2 withdrawal syndrome initially. Repeat medicine consult on 5/13 due to continued abdominal pain, anorexia.  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 Recommend f/u with infectious disease as outpatient, order placed for discharge    Chronic conditions:  #DMII  #HTN  -medicine consulted, PTA medications continued      Behavioral/Psychological/Social:  - Encourage unit programming    Safety:  - Continue precautions as noted above  - Status 15 minute checks    Legal Status: court hold    Disposition Plan   Reason for ongoing admission: poses an imminent risk to self and is unable to care for self due to severe psychosis or artem  Discharge location: Chemical dependency treatment facility  Discharge Medications: not ordered  Follow-up  "Appointments: not scheduled    Susana Szymanski MD  PGY-2    Patient staffed with attending psychiatrist, Dr Neff     This patient has been seen and evaluated by me, Sam Neff MD. I have discussed this patient with the resident and I agree with the findings and plan in this note. I have reviewed today's vital signs, medications, labs and imaging\".                   "

## 2021-05-15 PROCEDURE — 250N000013 HC RX MED GY IP 250 OP 250 PS 637: Performed by: STUDENT IN AN ORGANIZED HEALTH CARE EDUCATION/TRAINING PROGRAM

## 2021-05-15 PROCEDURE — 124N000002 HC R&B MH UMMC

## 2021-05-15 PROCEDURE — 250N000013 HC RX MED GY IP 250 OP 250 PS 637: Performed by: PHYSICIAN ASSISTANT

## 2021-05-15 RX ADMIN — OLANZAPINE 15 MG: 15 TABLET, ORALLY DISINTEGRATING ORAL at 20:58

## 2021-05-15 RX ADMIN — LISINOPRIL 5 MG: 5 TABLET ORAL at 09:57

## 2021-05-15 RX ADMIN — METFORMIN HYDROCHLORIDE 500 MG: 500 TABLET, FILM COATED ORAL at 09:57

## 2021-05-15 RX ADMIN — FLUOXETINE 10 MG: 10 CAPSULE ORAL at 09:57

## 2021-05-15 RX ADMIN — METFORMIN HYDROCHLORIDE 1000 MG: 500 TABLET ORAL at 17:48

## 2021-05-15 RX ADMIN — OLANZAPINE 5 MG: 5 TABLET, FILM COATED ORAL at 09:57

## 2021-05-15 ASSESSMENT — ACTIVITIES OF DAILY LIVING (ADL)
ORAL_HYGIENE: INDEPENDENT
LAUNDRY: UNABLE TO COMPLETE
HYGIENE/GROOMING: INDEPENDENT
HYGIENE/GROOMING: INDEPENDENT
DRESS: SCRUBS (BEHAVIORAL HEALTH);INDEPENDENT
DRESS: SCRUBS (BEHAVIORAL HEALTH)
ORAL_HYGIENE: INDEPENDENT

## 2021-05-15 NOTE — PLAN OF CARE
Problem: Behavioral Health Plan of Care  Goal: Optimized Coping Skills in Response to Life Stressors  Outcome: Improving     Problem: Behavioral Health Plan of Care  Goal: Plan of Care Review  Recent Flowsheet Documentation  Taken 5/15/2021 1001 by Susana Saravia RN  Plan of Care Reviewed With: patient  Patient Agreement with Plan of Care: agrees     Pt presented as alert and oriented throughout shift.  He presented as slightly untidy, but speech was clear and coherent.  Pt ate some of his meals and was med compliant.  He was also compliant with VS, however refused BG checks.  Pt was visible in the milieu on occasion, social with staff and select peers.  Pt denied SI/HI/SIB.  He denied any psychosis.  Pt denied any acute physical health concerns, pain, or side effects to medications.

## 2021-05-15 NOTE — PLAN OF CARE
Pt spent much more time in the milieu this evening.  He was pleasant and social with peers and staff.  He requested to have his room moved to pod 1 and that was completed.  He was cooperative with BG check prior to dinner.  BG = 164.  Patient continues to decline his dinner tray, but did eat snacks, chicken broth, and ensures.  She is still endorsing abdominal pain.  He requested and was given PRN [place].  He was medication compliant.  He denied SI/SIB/HI or any psychotic symptoms.

## 2021-05-15 NOTE — PROVIDER NOTIFICATION
05/15/21 0600   Sleep/Rest/Relaxation   Sleep/Rest/Relaxation appears asleep   Night Time # Hours 6.5 hours     Pt had a quiet night with no behavior or safety concerns. Was observed sleeping during the safety checks. He had 2 milk and 2 cheerios.

## 2021-05-15 NOTE — PROGRESS NOTES
SPIRITUAL HEALTH SERVICES  SPIRITUAL ASSESSMENT Progress Note  [facility] ([facility]) 12N   ON-CALL VISIT    REFERRAL SOURCE: consult for emotional support    Attempted to visit 3x. Brody was sleeping at each attempt. Will refer care to unit .    PLAN: Will refer to unit . [facility] remains available, as needed.    Lucero Fu  Chaplain Resident  Pager: [phone]

## 2021-05-16 PROCEDURE — 250N000013 HC RX MED GY IP 250 OP 250 PS 637: Performed by: STUDENT IN AN ORGANIZED HEALTH CARE EDUCATION/TRAINING PROGRAM

## 2021-05-16 PROCEDURE — 124N000002 HC R&B MH UMMC

## 2021-05-16 PROCEDURE — 250N000013 HC RX MED GY IP 250 OP 250 PS 637: Performed by: PHYSICIAN ASSISTANT

## 2021-05-16 RX ADMIN — METFORMIN HYDROCHLORIDE 500 MG: 500 TABLET, FILM COATED ORAL at 08:03

## 2021-05-16 RX ADMIN — FLUOXETINE 10 MG: 10 CAPSULE ORAL at 08:03

## 2021-05-16 RX ADMIN — OLANZAPINE 5 MG: 5 TABLET, FILM COATED ORAL at 08:03

## 2021-05-16 RX ADMIN — OLANZAPINE 15 MG: 15 TABLET, ORALLY DISINTEGRATING ORAL at 20:24

## 2021-05-16 RX ADMIN — LISINOPRIL 5 MG: 5 TABLET ORAL at 08:03

## 2021-05-16 ASSESSMENT — ACTIVITIES OF DAILY LIVING (ADL)
DRESS: SCRUBS (BEHAVIORAL HEALTH)
ORAL_HYGIENE: INDEPENDENT
DRESS: SCRUBS (BEHAVIORAL HEALTH);INDEPENDENT
ORAL_HYGIENE: INDEPENDENT
HYGIENE/GROOMING: INDEPENDENT
HYGIENE/GROOMING: INDEPENDENT
LAUNDRY: UNABLE TO COMPLETE

## 2021-05-16 NOTE — PROGRESS NOTES
"SPIRITUAL HEALTH SERVICES  SPIRITUAL ASSESSMENT Progress Note  [facility] ([facility]) 12N     REFERRAL SOURCE: follow up from on-call attempt 5/15    Brief check-in visit with Brody; pt resting in his room. Brody stated that he would like prayer for \"things at home\" and that he is very tired right now.  Shared brief prayer at bedside; Brody welcomed follow up visits \"when I'm more awake.\"    Plan: I will check in with staff to arrange visits 1-2x weekly when Brody is awake.    Heather Mcpherson MDiv  Associate   Pager [phone]    "

## 2021-05-16 NOTE — PLAN OF CARE
"  Problem: Behavioral Health Plan of Care  Goal: Adheres to Safety Considerations for Self and Others  Outcome: No Change     Problem: Sleep Disturbance  Goal: Adequate Sleep/Rest  Outcome: No Change     Pt slept for 6.5 hours without any concern. At around 0600H, Pt was in his room when another peer (female), walked by the Pt's room and was racially inappropriate stating \"You black nigga, you shouldn't be here.\" This statement triggered th Pt, the Pt came out was following the other Pt to her room, threw a cup on her, staff de-escalated the situation before it got worse. Pt was redirected back to his room. No PRN administered at this time because the Pt followed directions and quietly went back to his bed.  "

## 2021-05-16 NOTE — PLAN OF CARE
Problem: Behavioral Health Plan of Care  Goal: Absence of New-Onset Illness or Injury  Outcome: No Change     Problem: Behavioral Health Plan of Care  Goal: Develops/Participates in Therapeutic [place] to Support Successful Transition  Outcome: No Change     Pt presented as alert and oriented to place and self throughout shift.  He was visible in the milieu throughout the day, however did not participate in any daily groups.  Pt socialized with select staff.  He appeared fairly tidy and speech was clear and coherent.  Pt ate meals and was med compliant.  He was agreeable to vital signs but refused BG checks.  Pt denies SI/HI/SIB.  He denied psychosis.  Pt denied any side effects from medications. Pt also had a brief meeting/prayer with tara in late morning today.

## 2021-05-16 NOTE — PLAN OF CARE
Pt continues to spend more time outside his room and is more social with staff and peers.  He presents as overall pleasant and cooperative.  He continues to endorse abdominal pain, but did not request any PRNs.  He ate 50% of dinner this evening and his appetite is improving.  He also drank ensure and chicken broth.  He had snacks and reports no issues using the bathroom.  He was medication compliant, but doesn't like taking his metformin.  He said that he doesn't understand why he is no longer doing injections like he was PTA.  He denied SI/SIB/HI or any psychotic symptoms.

## 2021-05-16 NOTE — PROGRESS NOTES
"Pt became very agitated as another Pt on the unit was yelling racist comments over and over in the sales. Pt made his way to her room and started telling her that she needed to shut her mouth as he was sick and tired of her racist remarks. Pt then became more upset, before writer could make it to the other pod to redirect Pt, he threw his water at female Pt then his cup. Writer grabbed Pt and told him that he needed to back away from the room as staff was attempting to deal with the situation. Pt allowed writer to redirect him from female Pts doorway and back to his room. Once back in his room writer explained that Pt cannot go to other Pts rooms and yell at them nor throw things. Pt explained that he was out of line but it was too much to continue to listen to the other Pt screaming \"Nigger fuckers\" etc. Writer told Pt that he needed to stay in his room and have no more contact with that Pt. Pt understood and agreed to stay calm and in his room so that the situation with the other Pt could be dealt with.   "

## 2021-05-17 LAB — GLUCOSE BLDC GLUCOMTR-MCNC: 130 MG/DL (ref 70–99)

## 2021-05-17 PROCEDURE — 250N000013 HC RX MED GY IP 250 OP 250 PS 637: Performed by: PHYSICIAN ASSISTANT

## 2021-05-17 PROCEDURE — 124N000002 HC R&B MH UMMC

## 2021-05-17 PROCEDURE — 999N001017 HC STATISTIC GLUCOSE BY METER IP

## 2021-05-17 PROCEDURE — 250N000013 HC RX MED GY IP 250 OP 250 PS 637: Performed by: STUDENT IN AN ORGANIZED HEALTH CARE EDUCATION/TRAINING PROGRAM

## 2021-05-17 PROCEDURE — 99232 SBSQ HOSP IP/OBS MODERATE 35: CPT | Mod: GC | Performed by: PSYCHIATRY & NEUROLOGY

## 2021-05-17 RX ADMIN — FLUOXETINE 10 MG: 10 CAPSULE ORAL at 08:16

## 2021-05-17 RX ADMIN — OLANZAPINE 5 MG: 5 TABLET, FILM COATED ORAL at 08:16

## 2021-05-17 RX ADMIN — METFORMIN HYDROCHLORIDE 1000 MG: 500 TABLET, FILM COATED ORAL at 08:16

## 2021-05-17 RX ADMIN — METFORMIN HYDROCHLORIDE 1000 MG: 500 TABLET, FILM COATED ORAL at 17:47

## 2021-05-17 RX ADMIN — LISINOPRIL 5 MG: 5 TABLET ORAL at 08:16

## 2021-05-17 RX ADMIN — OLANZAPINE 15 MG: 15 TABLET, ORALLY DISINTEGRATING ORAL at 20:44

## 2021-05-17 ASSESSMENT — ACTIVITIES OF DAILY LIVING (ADL)
DRESS: SCRUBS (BEHAVIORAL HEALTH)
ORAL_HYGIENE: INDEPENDENT
ORAL_HYGIENE: INDEPENDENT
HYGIENE/GROOMING: INDEPENDENT
LAUNDRY: UNABLE TO COMPLETE
DRESS: SCRUBS (BEHAVIORAL HEALTH)
HYGIENE/GROOMING: INDEPENDENT

## 2021-05-17 NOTE — PLAN OF CARE
Assessment/Intervention/Current Symtoms and Care Coordination  -Pt's care discussed with treatment team in daily team meeting and chart notes were reviewed  [facility] Final commitment hearing is 5/20 @ 9am      - pt completed his rule 25 assessment and has been referred to [facility] in [place], [place].  CTC did not hear from [facility] today.      Discharge Plan or Goal  Still determining discharge plan for this patient. Patient was referred to [facility] through [facility] for MI/CD Residential Tx.      Barriers to Discharge   Petition for commitment through [place], final is on 5/20 @ 9 AM.     Referral Status  Per CD  Ashley Francis she sent Rule 25 Assessment to [facility] the morning of 5/17.     Legal Status   Court hold

## 2021-05-17 NOTE — PLAN OF CARE
Problem: Sleep Disturbance  Goal: Adequate Sleep/Rest  Outcome: Improving     Pt slept for 6.25 hours. No concerns noted this shift

## 2021-05-17 NOTE — PLAN OF CARE
How Severe Are Your Spot(S)?: mild Patient was isolated and withdrawn to his room for much of the evening.  He presented as blunted/flat, but also quite irritable.  Patient was asked to move rooms this evening so that we could accommodate a new admission.  Patient ultimately was cooperative, but was very unhappy about it and said that he wanted to leave.  Patient reminded that he has court this week and that discharge wasn't possible at this time.  Patient refused BG check and refused his scheduled metformin because he would rather get insulin injections and he believes that it's causing his abdominal discomfort, which he continues to endorse.  He denied any mental health concerns.  He was otherwise medication compliant.      Have Your Spot(S) Been Treated In The Past?: has not been treated Hpi Title: Evaluation of Skin Lesions

## 2021-05-17 NOTE — PROGRESS NOTES
"[facility], [place]   Psychiatric Progress Note  Hospital Day: 10        Interim History:   The patient's care was discussed with the treatment team during the daily team meeting and/or staff's chart notes were reviewed.  Staff reported that he was more present in the mileu over the weekend, did request to change rooms (from pod 2 to pod 1) which was accomodated. Did have conflict with another patient after said patient made multiple racist remarks toward him, able to be redirected to his room. Declined blood glucose checks, declined metformin, wanting insulin injections instead. Intake OK.     Upon interview today, was in bed, reported feeling tired. Feels like mood is \"fine,\" agreed that he continues to feel cooped up in the hospital. Reports that he's \"just waiting for everything to be settled [court on Thursday, referral to CD treatment].\" No SI or HI, no AH or VH. No side effects noted from medications, agreeable to increase in prozac dose. Continues to report difficulty sleeping. Appetite improved. No additional questions or concerns.           Medications:       FLUoxetine  10 mg Oral Daily     lisinopril  5 mg Oral Daily     metFORMIN  1,000 mg Oral Daily with supper     metFORMIN  1,000 mg Oral BID w/meals     OLANZapine  5 mg Oral QAM     OLANZapine zydis  15 mg Oral At Bedtime          Allergies:   No Known Allergies       Labs:     Recent Results (from the past 24 hour(s))   Glucose by meter    Collection Time: 05/17/21  8:22 AM   Result Value Ref Range    Glucose 130 (H) 70 - 99 mg/dL          Psychiatric Examination:     BP (!) 127/98   Pulse 75   Temp 97.6  F (36.4  C)   Resp 16   SpO2 100%   Weight is 0 lbs 0 oz  There is no height or weight on file to calculate BMI.    Weight over time:  There were no vitals filed for this visit.    Orthostatic Vitals       Most Recent      Lying Orthostatic /66 05/09 0900    Lying Orthostatic Pulse (bpm) 95 05/09 0900    " "Sitting Orthostatic /74 05/10 0842    Sitting Orthostatic Pulse (bpm) 115 05/10 0842            Cardiometabolic risk assessment. 05/10/21      Reviewed patient profile for cardiometabolic risk factors    Date taken /Value  REFERENCE RANGE   Abdominal Obesity  (Waist Circumference)   See nursing flowsheet Women ?35 in (88 cm)   Men ?40 in (102 cm)      Triglycerides  Triglycerides   Date Value Ref Range Status   09/28/2020 95 <150 mg/dL Final       ?150 mg/dL (1.7 mmol/L) or current treatment for elevated triglycerides   HDL cholesterol  HDL Cholesterol   Date Value Ref Range Status   09/28/2020 54 >39 mg/dL Final   ]   Women <50 mg/dL (1.3 mmol/L) in women or current treatment for low HDL cholesterol  Men <40 mg/dL (1 mmol/L) in men or current treatment for low HDL cholesterol     Fasting plasma glucose (FPG) Lab Results   Component Value Date     05/07/2021      FPG ?100 mg/dL (5.6 mmol/L) or treatment for elevated blood glucose   Blood pressure  BP Readings from Last 3 Encounters:   05/17/21 (!) 127/98   11/19/20 (!) 149/89   10/30/20 (!) 141/80    Blood pressure ?130/85 mmHg or treatment for elevated blood pressure   Family History  See family history     Appearance: poorly groomed and awake, fatigued. Lying in bed with covers around him  Attitude:  somewhat cooperative  Eye Contact:  Poor  Mood:  \"fine\"  Affect:  mood congruent, intensity is blunted and restricted range  Speech:  paucity of speech and mumbling, mumbling improved with awakening  Language: fluent and intact in English  Psychomotor, Gait, Musculoskeletal:  no evidence of tardive dyskinesia, dystonia, or tics  Throught Process:  linear and goal oriented  Associations:  no loose associations  Thought Content:  No evidence of SI or HI, not responding to internal stimuli, denied AH, denied VH  Insight:  fair  Judgement:  fair  Oriented to:  time, person, and place  Attention Span and Concentration:  fair  Recent and Remote Memory:  " intact  Fund of Knowledge:  appropriate    Clinical Global Impressions  First: 5/3 5/13/2021     Most recent:             Precautions:     Behavioral Orders   Procedures     Assault precautions     Code 1 - Restrict to Unit     Code 2     FOR CXR on 5/11/21     Elopement precautions     Routine Programming     As clinically indicated     Status 15     Every 15 minutes.     Suicide precautions     Patients on Suicide Precautions should have a Combination Diet ordered that includes a Diet selection(s) AND a Behavioral Tray selection for Safe Tray - with utensils, or Safe Tray - NO utensils            Diagnoses:     Psychosis, unspecified (substance induced vs primary psychotic illness)  Depressive disorder, unspecified (substance induced vs MDD)   PTSD  Anxiety, unspecified  Stimulant Use Disorder, cocaine and amphetamine type, severe  Cannabis Use Disorder, severe  Nicotine Use Disorder, moderate  Extensive history of malingering per chart review  R/O Antisocial personality disorder  Type II Diabetes  Diabetic peripheral neuropathy  HTN         Assessment & Plan:     Assessment:  This patient is a 60 year old -American/Black male with history PTSD, depression, psychosis, polysubstance use (primarily cocaine) who presented to [facility] ED with active SI and plan to overdose or run into traffic, severe symptoms of psychosis and aggressive behavior toward others in context of medication non-adherence, homelessness, methamphetamine and cocaine use/intoxication.     The above is in the context of multiple hospitalizations for similar symptoms. Current symptoms are consistent with unspecified psychosis and depression, unclear if secondary to substances vs primary disorders.  Petition for commitment with [facility] was filed at outside ED. Patient will likely benefit from CD treatment upon discharge.    Hospital summary: Initiated zyprexa on day of admission for treatment of psychosis and mood, 10mg BID. Started  prozac 10mg on 5/10 due to symptoms of depression, history of mood improvement on this medication. Initially during hospital stay had nausea and vomiting thought 2/2 withdrawal syndrome, and after nausea had resolved, had persistent decreased appetite, and was monitored for fluid intake/output, particularly given diabetes diagnosis. Due to continued decreased appetite + abdominal pain, internal medicine consulted on 5/13. Due to insomnia, changed zyprexa dosing from 10mg BID to 5mg + 15mg at bedtime. Increased prozac to 20mg on 5/18 for continued support in managing depressive symptoms. Over the weekend of 5/14/-5/17, was noted to be more active in the milieu, appetite improved.     Changes today:  Increase prozac to 20mg daily    Target psychiatric symptoms and interventions:  #Unspecified psychosis (substance-induced vs schizophrenia vs schizoaffective disorder)  #Unspecified depressive disorder (MDD vs substance-induced mood disorder)  #stimulant use disorder  #stimulant use withdrawal  -zyprexa 5mg qam + 15mg at betime  -prozac 20mg daily    Risks, benefits, and alternatives discussed at length with patient.     Acute Medical Problems and Treatments:   #nausea and vomiting  #abdominal pain  #anorexia  -medicine consult x2: thought most likely 2/2 withdrawal syndrome initially. Repeat medicine consult on 5/13 due to continued abdominal pain, anorexia.  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 Recommend f/u with infectious disease as outpatient, order placed for discharge    Chronic conditions:  #DMII  #HTN  -medicine consulted, PTA medications continued      Behavioral/Psychological/Social:  - Encourage unit programming    Safety:  - Continue precautions as noted above  - Status 15 minute checks    Legal Status: court hold    Disposition Plan   Reason for ongoing admission: poses an  imminent risk to self and is unable to care for self due to severe psychosis or artem  Discharge location: Chemical dependency treatment facility  Discharge Medications: not ordered  Follow-up Appointments: not scheduled    Susana Szymanski MD  PGY-2    Patient seen and staffed with attending psychiatrist, Emilee Rao MD

## 2021-05-17 NOTE — PLAN OF CARE
Nursing assessment completed. Patient up early and visible in the lounge. He cooperates with vitals, am medications, and doing his BG. EE=[id number]. He eats his full breakfast tray. Blunted affect.

## 2021-05-18 PROCEDURE — 250N000013 HC RX MED GY IP 250 OP 250 PS 637: Performed by: PSYCHIATRY & NEUROLOGY

## 2021-05-18 PROCEDURE — 250N000013 HC RX MED GY IP 250 OP 250 PS 637: Performed by: STUDENT IN AN ORGANIZED HEALTH CARE EDUCATION/TRAINING PROGRAM

## 2021-05-18 PROCEDURE — 124N000002 HC R&B MH UMMC

## 2021-05-18 PROCEDURE — 99232 SBSQ HOSP IP/OBS MODERATE 35: CPT | Performed by: PSYCHIATRY & NEUROLOGY

## 2021-05-18 PROCEDURE — 250N000013 HC RX MED GY IP 250 OP 250 PS 637: Performed by: PHYSICIAN ASSISTANT

## 2021-05-18 RX ORDER — GABAPENTIN 300 MG/1
300 CAPSULE ORAL 3 TIMES DAILY
Status: DISCONTINUED | OUTPATIENT
Start: 2021-05-18 | End: 2021-05-24 | Stop reason: HOSPADM

## 2021-05-18 RX ADMIN — FLUOXETINE 20 MG: 20 CAPSULE ORAL at 10:47

## 2021-05-18 RX ADMIN — ALUMINUM HYDROXIDE, MAGNESIUM HYDROXIDE, AND SIMETHICONE 30 ML: 200; 200; 20 SUSPENSION ORAL at 00:05

## 2021-05-18 RX ADMIN — METFORMIN HYDROCHLORIDE 1000 MG: 500 TABLET, FILM COATED ORAL at 18:03

## 2021-05-18 RX ADMIN — OLANZAPINE 5 MG: 5 TABLET, FILM COATED ORAL at 10:46

## 2021-05-18 RX ADMIN — METFORMIN HYDROCHLORIDE 1000 MG: 500 TABLET, FILM COATED ORAL at 10:46

## 2021-05-18 RX ADMIN — OLANZAPINE 15 MG: 15 TABLET, ORALLY DISINTEGRATING ORAL at 20:40

## 2021-05-18 RX ADMIN — Medication: at 00:05

## 2021-05-18 RX ADMIN — GABAPENTIN 300 MG: 300 CAPSULE ORAL at 10:47

## 2021-05-18 RX ADMIN — GABAPENTIN 300 MG: 300 CAPSULE ORAL at 20:40

## 2021-05-18 ASSESSMENT — ACTIVITIES OF DAILY LIVING (ADL)
HYGIENE/GROOMING: INDEPENDENT
HYGIENE/GROOMING: INDEPENDENT
DRESS: SCRUBS (BEHAVIORAL HEALTH);INDEPENDENT
DRESS: SCRUBS (BEHAVIORAL HEALTH)
LAUNDRY: UNABLE TO COMPLETE
ORAL_HYGIENE: INDEPENDENT
ORAL_HYGIENE: INDEPENDENT

## 2021-05-18 NOTE — PLAN OF CARE
Problem: Behavioral Health Plan of Care  Goal: Absence of New-Onset Illness or Injury  Outcome: Improving     Problem: Suicidal Behavior  Goal: Suicidal Behavior is Absent or Managed  Outcome: Improving     Problem: Behavioral Health Plan of Care  Goal: Adheres to Safety Considerations for Self and Others  Outcome: Improving     Pt appeared alert and oriented to place and self throughout shift.  He was primarily isolative to his room throughout the day, sleeping in his bed.  Pt presented as mostly tidy and speech was clear and coherent.  Pt did not eat breakfast or lunch, nor did he participate in most of nursing assessments or cares.  Pt refused VS, and BG checks.  Pt initially refused morning medications, however took medications later in the morning after being informed that Gabapentin was added to his scheduled meds.  He took all medications except his Lisinopril because he continued to decline VS checks.  Pt was not observed to be in any pain, have any acute physical health concerns, or experiencing any side effects from medications at this time.

## 2021-05-18 NOTE — PLAN OF CARE
Assessment/Intervention/Current Symtoms and Care Coordination  -Pt's care discussed with treatment team in daily team meeting and chart notes were reviewed  [facility] Final commitment hearing is 5/20 @ 9am      - Writer called [facility] to check on status of referral. They report they never received the referral.  Writer asked person who completed the CD evaluation and referral to send the needed documents again to [facility].  They have immediate openings at [facility].      Discharge Plan or Goal  Discharge to residential MI/CD program.      Barriers to Discharge   Stabilization of symptoms, Petition for commitment through [place] - final hearing on 5/20 and also opening for MI/CD tx     Referral Status  Referral to [facility] - [facility]      Legal Status   Court hold

## 2021-05-18 NOTE — PLAN OF CARE
Pt spent much of the evening in his room, coming out for meals and food.  He continues to have improved intake.  He initially refused his metformin, but then later requested it.  He presented as somewhat irritable at times, but was overall calm and cooperative.  He did not have any episodes of agitation.  He was medication compliant.  He denied SI/SIB/HI or any psychotic symptoms.

## 2021-05-18 NOTE — PROGRESS NOTES
Pt woke and asked writer to let him in the bathroom. While walking with Pt writer noticed that PTs scrubs were wet. Writer offered Pt new scrubs and changed linins on bed. Pt mentioned that his room was very cold yet he had been sweating very badly. Pt said that this had not happened to him in the past but did not seem too concerned.

## 2021-05-18 NOTE — PLAN OF CARE
Problem: Sleep Disturbance  Goal: Adequate Sleep/Rest  Outcome: No Change     Pt came out around 0200H, reported abdominal discomfort. PRN Maalox was administered. Pt requested for Gabapentin for his legs, stating that he was taking the medication prior to his admission for neuropathy and that he has some in his bag. Pt does not have an order for gabapentin, writer left a note for the providers. Pt accepted Bengay topical cream. No other concerns were reported.  Pt slept for 5 hours.

## 2021-05-18 NOTE — PROGRESS NOTES
"[facility], [place]   Psychiatric Progress Note  Hospital Day: 11        Interim History:   The patient's care was discussed with the treatment team during the daily team meeting and/or staff's chart notes were reviewed.  Staff reported that patient has been more pleasant in the milieu overall.  He continues to have improved oral intake.  He did not have any episodes of agitation.  He has remained medication adherent.  He denied SI/SIB/HI or any psychotic symptoms.  No evidence of psychosis or artem.  The patient is requesting reinitiation of PTA gabapentin due to leg pain.    Upon interview today, was in bed, reported feeling tired. Feels like mood is \"good.\" No SI or HI, no AH or VH. No side effects noted from medications. Continues to report difficulty sleeping. Appetite improved.  States that he feels that he is at baseline.  No additional questions or concerns other than his request to reinitiate gabapentin for leg pain.  He is willing to go [id number] to chemical dependency treatment.         Medications:       FLUoxetine  20 mg Oral Daily     gabapentin  300 mg Oral TID     lisinopril  5 mg Oral Daily     metFORMIN  1,000 mg Oral BID w/meals     OLANZapine  5 mg Oral QAM     OLANZapine zydis  15 mg Oral At Bedtime          Allergies:   No Known Allergies       Labs:     No results found for this or any previous visit (from the past 24 hour(s)).       Psychiatric Examination:     BP (!) 143/80   Pulse 73   Temp 98.3  F (36.8  C)   Resp 16   SpO2 100%   Weight is 0 lbs 0 oz  There is no height or weight on file to calculate BMI.    Weight over time:  There were no vitals filed for this visit.    Orthostatic Vitals       Most Recent      Lying Orthostatic /66 05/09 0900    Lying Orthostatic Pulse (bpm) 95 05/09 0900    Sitting Orthostatic /74 05/10 0842    Sitting Orthostatic Pulse (bpm) 115 05/10 0842            Cardiometabolic risk assessment. " "05/10/21      Reviewed patient profile for cardiometabolic risk factors    Date taken /Value  REFERENCE RANGE   Abdominal Obesity  (Waist Circumference)   See nursing flowsheet Women ?35 in (88 cm)   Men ?40 in (102 cm)      Triglycerides  Triglycerides   Date Value Ref Range Status   09/28/2020 95 <150 mg/dL Final       ?150 mg/dL (1.7 mmol/L) or current treatment for elevated triglycerides   HDL cholesterol  HDL Cholesterol   Date Value Ref Range Status   09/28/2020 54 >39 mg/dL Final   ]   Women <50 mg/dL (1.3 mmol/L) in women or current treatment for low HDL cholesterol  Men <40 mg/dL (1 mmol/L) in men or current treatment for low HDL cholesterol     Fasting plasma glucose (FPG) Lab Results   Component Value Date     05/07/2021      FPG ?100 mg/dL (5.6 mmol/L) or treatment for elevated blood glucose   Blood pressure  BP Readings from Last 3 Encounters:   05/17/21 (!) 143/80   11/19/20 (!) 149/89   10/30/20 (!) 141/80    Blood pressure ?130/85 mmHg or treatment for elevated blood pressure   Family History  See family history     Appearance: poorly groomed and awake, fatigued. Lying in bed with covers around him  Attitude: Cooperative  Eye Contact: Fair  Mood:  \"fine\"  Affect:  mood congruent, intensity is blunted and restricted range  Speech: Clear and coherent  Language: fluent and intact in English  Psychomotor, Gait, Musculoskeletal:  no evidence of tardive dyskinesia, dystonia, or tics  Throught Process:  linear and goal oriented  Associations:  no loose associations  Thought Content:  No evidence of SI or HI, not responding to internal stimuli, denied AH, denied VH  Insight:  fair  Judgement:  fair  Oriented to:  time, person, and place  Attention Span and Concentration:  fair  Recent and Remote Memory:  intact  Fund of Knowledge:  appropriate    Clinical Global Impressions  First: 5/3 5/13/2021     Most recent:2             Precautions:     Behavioral Orders   Procedures     Assault precautions     " Code 1 - Restrict to Unit     Code 2     FOR CXR on 5/11/21     Elopement precautions     Routine Programming     As clinically indicated     Status 15     Every 15 minutes.     Suicide precautions     Patients on Suicide Precautions should have a Combination Diet ordered that includes a Diet selection(s) AND a Behavioral Tray selection for Safe Tray - with utensils, or Safe Tray - NO utensils            Diagnoses:     Psychosis, unspecified (substance induced vs primary psychotic illness)  Depressive disorder, unspecified (substance induced vs MDD)   PTSD  Anxiety, unspecified  Stimulant Use Disorder, cocaine and amphetamine type, severe  Cannabis Use Disorder, severe  Nicotine Use Disorder, moderate  Extensive history of malingering per chart review  R/O Antisocial personality disorder  Type II Diabetes  Diabetic peripheral neuropathy  HTN         Assessment & Plan:     Assessment:  This patient is a 60 year old -American/Black male with history PTSD, depression, psychosis, polysubstance use (primarily cocaine) who presented to [facility] ED with active SI and plan to overdose or run into traffic, severe symptoms of psychosis and aggressive behavior toward others in context of medication non-adherence, homelessness, methamphetamine and cocaine use/intoxication.     The above is in the context of multiple hospitalizations for similar symptoms. Current symptoms are consistent with unspecified psychosis and depression, unclear if secondary to substances vs primary disorders.  Petition for commitment with [facility] was filed at outside ED. Patient will likely benefit from CD treatment upon discharge.    Hospital summary: Initiated zyprexa on day of admission for treatment of psychosis and mood, 10mg BID. Started prozac 10mg on 5/10 due to symptoms of depression, history of mood improvement on this medication. Initially during hospital stay had nausea and vomiting thought 2/2 withdrawal syndrome, and after nausea  had resolved, had persistent decreased appetite, and was monitored for fluid intake/output, particularly given diabetes diagnosis. Due to continued decreased appetite + abdominal pain, internal medicine consulted on 5/13. Due to insomnia, changed zyprexa dosing from 10mg BID to 5mg + 15mg at bedtime. Increased prozac to 20mg on 5/18 for continued support in managing depressive symptoms. Over the weekend of 5/14/-5/17, was noted to be more active in the milieu, appetite improved.     Changes today:  Add gabapentin 300 mg 3 times daily for neuropathic pain/peripheral neuropathy.  Of note, PTA dose was 400 mg 3 times a day    Target psychiatric symptoms and interventions:  #Unspecified psychosis (substance-induced vs schizophrenia vs schizoaffective disorder)  #Unspecified depressive disorder (MDD vs substance-induced mood disorder)  #stimulant use disorder  #stimulant use withdrawal  -zyprexa 5mg qam + 15mg at betime  -prozac 20mg daily    Risks, benefits, and alternatives discussed at length with patient.     Acute Medical Problems and Treatments:   #nausea and vomiting  #abdominal pain  #anorexia  -medicine consult x2: thought most likely 2/2 withdrawal syndrome initially. Repeat medicine consult on 5/13 due to continued abdominal pain, anorexia.  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 Recommend f/u with infectious disease as outpatient, order placed for discharge    Chronic conditions:  #DMII  #HTN  -medicine consulted, PTA medications continued      Behavioral/Psychological/Social:  - Encourage unit programming    Safety:  - Continue precautions as noted above  - Status 15 minute checks    Legal Status: court hold    Disposition Plan   Reason for ongoing admission: poses an imminent risk to self and is unable to care for self due to severe psychosis or artem  Discharge location: Chemical  dependency treatment facility  Discharge Medications: not ordered  Follow-up Appointments: not scheduled    Selma Rao MD  [facility] Psychiatry

## 2021-05-18 NOTE — PROGRESS NOTES
"SPIRITUAL HEALTH SERVICES   Spiritual Assessment Progress Note (Behavioral Health/CD Focus)  [employer] ([facility]) 12N    REFERRAL SOURCE: follow up support    On this visit, I checked in with staff and met with Brody in his room for 10 minutes.    EXPERIENCE OF ILLNESS/HOSPITALIZATION:  Brody has been very sleepy, on my second attempt, staff woke Brody who agreed to the visit. Brody shared about his \"rough road\" over the past 4.5 years, beginning with the death of his mother, moving to different places around the country and \"trying to find myself again.\"  I offered supportive listening and prayer at the end of our visit at Brody's request.    MEANING-MAKING:  Brody stated that he was very close to his mother and recognizes that he is still grieving the loss.    SPIRITUALITY/VALUES/Mormon:  Confucianism    COPING/SPIRITUAL PRACTICES: Brody welcomed a brief reading from [facility] and asked for prayer for healing.    SUPPORT SYSTEMS: Not Discussed    PLAN: I will follow up with Brody 1-2x weekly as he remains on the unit.                                                                                                                                             Heather Mcpherson MDiv  Associate   Pager [phone]    "

## 2021-05-19 PROCEDURE — 250N000013 HC RX MED GY IP 250 OP 250 PS 637: Performed by: PSYCHIATRY & NEUROLOGY

## 2021-05-19 PROCEDURE — 250N000013 HC RX MED GY IP 250 OP 250 PS 637: Performed by: PHYSICIAN ASSISTANT

## 2021-05-19 PROCEDURE — 124N000002 HC R&B MH UMMC

## 2021-05-19 PROCEDURE — 99232 SBSQ HOSP IP/OBS MODERATE 35: CPT | Mod: GC | Performed by: PSYCHIATRY & NEUROLOGY

## 2021-05-19 PROCEDURE — 250N000013 HC RX MED GY IP 250 OP 250 PS 637: Performed by: STUDENT IN AN ORGANIZED HEALTH CARE EDUCATION/TRAINING PROGRAM

## 2021-05-19 PROCEDURE — 99207 PR CDG-MDM COMPONENT: MEETS MODERATE - UP CODED: CPT | Performed by: PSYCHIATRY & NEUROLOGY

## 2021-05-19 RX ORDER — VITAMIN B COMPLEX
50 TABLET ORAL DAILY
Qty: 60 TABLET | Refills: 0 | Status: SHIPPED | OUTPATIENT
Start: 2021-05-19

## 2021-05-19 RX ORDER — OLANZAPINE 5 MG/1
TABLET ORAL
Qty: 120 TABLET | Refills: 0 | Status: SHIPPED | OUTPATIENT
Start: 2021-05-19

## 2021-05-19 RX ORDER — GABAPENTIN 300 MG/1
300 CAPSULE ORAL 3 TIMES DAILY
Qty: 90 CAPSULE | Refills: 0 | Status: SHIPPED | OUTPATIENT
Start: 2021-05-19

## 2021-05-19 RX ORDER — TRAZODONE HYDROCHLORIDE 50 MG/1
50 TABLET, FILM COATED ORAL
Qty: 60 TABLET | Refills: 0 | Status: SHIPPED | OUTPATIENT
Start: 2021-05-19

## 2021-05-19 RX ORDER — LISINOPRIL 5 MG/1
5 TABLET ORAL DAILY
Qty: 30 TABLET | Refills: 0 | Status: SHIPPED | OUTPATIENT
Start: 2021-05-20

## 2021-05-19 RX ADMIN — LISINOPRIL 5 MG: 5 TABLET ORAL at 08:54

## 2021-05-19 RX ADMIN — FLUOXETINE 20 MG: 20 CAPSULE ORAL at 08:55

## 2021-05-19 RX ADMIN — GABAPENTIN 300 MG: 300 CAPSULE ORAL at 13:14

## 2021-05-19 RX ADMIN — GABAPENTIN 300 MG: 300 CAPSULE ORAL at 19:35

## 2021-05-19 RX ADMIN — METFORMIN HYDROCHLORIDE 1000 MG: 500 TABLET, FILM COATED ORAL at 17:47

## 2021-05-19 RX ADMIN — GABAPENTIN 300 MG: 300 CAPSULE ORAL at 08:54

## 2021-05-19 RX ADMIN — METFORMIN HYDROCHLORIDE 1000 MG: 500 TABLET, FILM COATED ORAL at 08:55

## 2021-05-19 RX ADMIN — OLANZAPINE 5 MG: 5 TABLET, FILM COATED ORAL at 08:55

## 2021-05-19 RX ADMIN — OLANZAPINE 15 MG: 15 TABLET, ORALLY DISINTEGRATING ORAL at 19:35

## 2021-05-19 ASSESSMENT — ACTIVITIES OF DAILY LIVING (ADL)
HYGIENE/GROOMING: HANDWASHING;INDEPENDENT
ORAL_HYGIENE: INDEPENDENT
HYGIENE/GROOMING: INDEPENDENT
DRESS: SCRUBS (BEHAVIORAL HEALTH)
DRESS: SCRUBS (BEHAVIORAL HEALTH)
LAUNDRY: UNABLE TO COMPLETE
ORAL_HYGIENE: INDEPENDENT
LAUNDRY: UNABLE TO COMPLETE

## 2021-05-19 NOTE — PLAN OF CARE
Assessment/Intervention/Current Symtoms and Care Coordination  -Pt's care discussed with treatment team in daily team meeting and chart notes were reviewed  [facility] Final commitment hearing is 5/20 @ 9am      - Writer called [facility], they received the Rule 25.  They requested H&P, MAR and last 72 hrs of progress notes.  Those were faxed to them. They are reviewing.  Will update writer later today.      Discharge Plan or Goal  Discharge to residential MI/CD program [facility]      Barriers to Discharge   Stabilization of symptoms, Petition for commitment through [place] - final hearing on 5/20 and also opening for MI/CD tx     Referral Status  [facility] Restrepo is reviewing Rule 25.      Legal Status   Court hold

## 2021-05-19 NOTE — PLAN OF CARE
Nursing assessment completed. Patient out for breakfast, but mostly isolative to his room. He does sit in the lounge for a bit, but declines to attend group and is not interactive with peers. Cooperative with vitals and medications. Blunted affect. Denies symptoms, side effects or any complaints.

## 2021-05-19 NOTE — PROGRESS NOTES
"[facility], [place]   Psychiatric Progress Note  Hospital Day: 12        Interim History:   The patient's care was discussed with the treatment team during the daily team meeting and/or staff's chart notes were reviewed.  Staff reported that Brody met with , and shared that he is still grieving the loss of his mother from ~4.5yrs ago. Primarily isolative to room, declined nursing cares and medications in the AM, later took medications. Did not eat meals in the AM, did eat dinner. Did not shower, is malodorous. Slept 6.5hrs.     Upon interview today, was in bed, slightly more alert/talkative than days prior. Reports doing \"alright,\" no major changes to mood. Denies any physical health complaints. No SI/HI, no AH/VH. Denies side effects from medications. Reports addition of gabapentin was helpful for leg pain, no additional pain noted. Informed of court tomorrow, continues to be agreeable to [id number] CD treatment.         Medications:       FLUoxetine  20 mg Oral Daily     gabapentin  300 mg Oral TID     lisinopril  5 mg Oral Daily     metFORMIN  1,000 mg Oral BID w/meals     OLANZapine  5 mg Oral QAM     OLANZapine zydis  15 mg Oral At Bedtime          Allergies:   No Known Allergies       Labs:     No results found for this or any previous visit (from the past 24 hour(s)).       Psychiatric Examination:     BP (!) 143/80   Pulse 73   Temp 98.7  F (37.1  C)   Resp 16   SpO2 100%   Weight is 0 lbs 0 oz  There is no height or weight on file to calculate BMI.    Weight over time:  There were no vitals filed for this visit.    Orthostatic Vitals       Most Recent      Lying Orthostatic /66 05/09 0900    Lying Orthostatic Pulse (bpm) 95 05/09 0900    Sitting Orthostatic /74 05/10 0842    Sitting Orthostatic Pulse (bpm) 115 05/10 0842            Cardiometabolic risk assessment. 05/10/21      Reviewed patient profile for cardiometabolic risk factors    Date taken " "/Value  REFERENCE RANGE   Abdominal Obesity  (Waist Circumference)   See nursing flowsheet Women ?35 in (88 cm)   Men ?40 in (102 cm)      Triglycerides  Triglycerides   Date Value Ref Range Status   09/28/2020 95 <150 mg/dL Final       ?150 mg/dL (1.7 mmol/L) or current treatment for elevated triglycerides   HDL cholesterol  HDL Cholesterol   Date Value Ref Range Status   09/28/2020 54 >39 mg/dL Final   ]   Women <50 mg/dL (1.3 mmol/L) in women or current treatment for low HDL cholesterol  Men <40 mg/dL (1 mmol/L) in men or current treatment for low HDL cholesterol     Fasting plasma glucose (FPG) Lab Results   Component Value Date     05/07/2021      FPG ?100 mg/dL (5.6 mmol/L) or treatment for elevated blood glucose   Blood pressure  BP Readings from Last 3 Encounters:   05/17/21 (!) 143/80   11/19/20 (!) 149/89   10/30/20 (!) 141/80    Blood pressure ?130/85 mmHg or treatment for elevated blood pressure   Family History  See family history     Appearance: poorly groomed and awake, fatigued. Lying in bed with covers around him  Attitude: Cooperative  Eye Contact: Fair  Mood:  \"fine\"  Affect:  mood congruent, intensity is blunted and restricted range  Speech: Clear and coherent  Language: fluent and intact in English  Psychomotor, Gait, Musculoskeletal:  no evidence of tardive dyskinesia, dystonia, or tics  Throught Process:  linear and goal oriented  Associations:  no loose associations  Thought Content:  No evidence of SI or HI, not responding to internal stimuli, denied AH, denied VH  Insight:  fair  Judgement:  fair  Oriented to:  time, person, and place  Attention Span and Concentration:  fair  Recent and Remote Memory:  intact  Fund of Knowledge:  appropriate    Clinical Global Impressions  First: 5/3 5/13/2021     Most recent:2             Precautions:     Behavioral Orders   Procedures     Assault precautions     Code 1 - Restrict to Unit     Code 2     FOR CXR on 5/11/21     Elopement precautions "     Routine Programming     As clinically indicated     Status 15     Every 15 minutes.     Suicide precautions     Patients on Suicide Precautions should have a Combination Diet ordered that includes a Diet selection(s) AND a Behavioral Tray selection for Safe Tray - with utensils, or Safe Tray - NO utensils            Diagnoses:     Psychosis, unspecified (substance induced vs primary psychotic illness)  Depressive disorder, unspecified (substance induced vs MDD)   PTSD  Anxiety, unspecified  Stimulant Use Disorder, cocaine and amphetamine type, severe  Cannabis Use Disorder, severe  Nicotine Use Disorder, moderate  Extensive history of malingering per chart review  R/O Antisocial personality disorder  Type II Diabetes  Diabetic peripheral neuropathy  HTN         Assessment & Plan:     Assessment:  This patient is a 60 year old -American/Black male with history PTSD, depression, psychosis, polysubstance use (primarily cocaine) who presented to [facility] ED with active SI and plan to overdose or run into traffic, severe symptoms of psychosis and aggressive behavior toward others in context of medication non-adherence, homelessness, methamphetamine and cocaine use/intoxication.     The above is in the context of multiple hospitalizations for similar symptoms. Current symptoms are consistent with unspecified psychosis and depression, unclear if secondary to substances vs primary disorders.  Petition for commitment with [facility] was filed at outside ED. Patient will likely benefit from CD treatment upon discharge.    Hospital summary: Initiated zyprexa on day of admission for treatment of psychosis and mood, 10mg BID. Started prozac 10mg on 5/10 due to symptoms of depression, history of mood improvement on this medication. Initially during hospital stay had nausea and vomiting thought 2/2 withdrawal syndrome, and after nausea had resolved, had persistent decreased appetite, and was monitored for fluid  intake/output, particularly given diabetes diagnosis. Due to continued decreased appetite + abdominal pain, internal medicine consulted on 5/13. Due to insomnia, changed zyprexa dosing from 10mg BID to 5mg + 15mg at bedtime. Increased prozac to 20mg on 5/18 for continued support in managing depressive symptoms. Over the weekend of 5/14/-5/17, was noted to be more active in the milieu, appetite improved. Added gabapentin 300mg TID for neuropathic pain (presumed peripheral neuropathy 2/2 DMII) to good effect 5/18.    Changes today:  none    Target psychiatric symptoms and interventions:  #Unspecified psychosis (substance-induced vs schizophrenia vs schizoaffective disorder)  #Unspecified depressive disorder (MDD vs substance-induced mood disorder)  #stimulant use disorder  #stimulant use withdrawal  -zyprexa 5mg qam + 15mg at betime  -prozac 20mg daily    Risks, benefits, and alternatives discussed at length with patient.     Acute Medical Problems and Treatments:   #nausea and vomiting  #abdominal pain  #anorexia  -medicine consult x2: thought most likely 2/2 withdrawal syndrome initially. Repeat medicine consult on 5/13 due to continued abdominal pain, anorexia.  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 Recommend f/u with infectious disease as outpatient, order placed for discharge    Chronic conditions:  #DMII  #HTN  -medicine consulted, PTA medications continued      Behavioral/Psychological/Social:  - Encourage unit programming    Safety:  - Continue precautions as noted above  - Status 15 minute checks    Legal Status: court hold    Disposition Plan   Reason for ongoing admission: poses an imminent risk to self and is unable to care for self due to severe psychosis or artem  Discharge location: Chemical dependency treatment facility  Discharge Medications: not ordered  Follow-up  Appointments: not scheduled    Susana Szymanski MD  PGY-2     Patient seen and staffed with attending psychiatrist, Emilee Rao MD

## 2021-05-19 NOTE — PLAN OF CARE
Problem: OT General Care Plan  Goal: OT Goal 1  Description: OT Goal 1  Outcome: No Change     Pt is frequently invited to groups, including today, though declines - usually states he's in pain or tired, though is always friendly and pleasant with writer.  When pt is in the lounge, he has been friendly and social with writer.

## 2021-05-19 NOTE — PLAN OF CARE
Pt asleep at start of shift. Breathing quiet and unlabored.     Appears to have slept 6.75 hours.     Pt on SUICIDE, ASSAULT, and ELOPEMENT, precautions in addition to single room order. Any related events noted above.     Will continue to monitor and assess.   Problem: Sleep Disturbance  Goal: Adequate Sleep/Rest  Outcome: No Change

## 2021-05-19 NOTE — PLAN OF CARE
BEHAVIORAL TEAM DISCUSSION    Participants: Dr. Emilee Rao, Ernestina Szymanski MD (resident), Charmaine Mcgee RN, Shira Lopez RN, SB- Jonelle VALLES, [facility]   Progress: Pt at baseline currently.   Anticipated length of stay: 1-3 days   Continued Stay Criteria/Rationale: needs safe residential MI/CD treatment  Medical/Physical: back pain   Precautions:   Behavioral Orders   Procedures     Assault precautions     Code 1 - Restrict to Unit     Code 2     FOR CXR on 5/11/21     Elopement precautions     Routine Programming     As clinically indicated     Status 15     Every 15 minutes.     Suicide precautions     Patients on Suicide Precautions should have a Combination Diet ordered that includes a Diet selection(s) AND a Behavioral Tray selection for Safe Tray - with utensils, or Safe Tray - NO utensils       Plan: referral sent to [facility].  They are reviewing and have openings. Waiting to hear after they review his Rule 25.   Rationale for change in precautions or plan: no change

## 2021-05-19 NOTE — PLAN OF CARE
Pt was isolated and withdrawn to his room for much the evening.  He presents as cooperative, but somewhat irritable. He ate much of his dinner and snacks.  He did not shower and is malodorous.  He denied any mental health concerns and denied SI/SIB/HI or any psychotic symptoms.  He was medication compliant.

## 2021-05-20 PROCEDURE — 99232 SBSQ HOSP IP/OBS MODERATE 35: CPT | Mod: GC | Performed by: PSYCHIATRY & NEUROLOGY

## 2021-05-20 PROCEDURE — 124N000002 HC R&B MH UMMC

## 2021-05-20 PROCEDURE — 250N000013 HC RX MED GY IP 250 OP 250 PS 637: Performed by: STUDENT IN AN ORGANIZED HEALTH CARE EDUCATION/TRAINING PROGRAM

## 2021-05-20 PROCEDURE — 250N000013 HC RX MED GY IP 250 OP 250 PS 637: Performed by: PHYSICIAN ASSISTANT

## 2021-05-20 PROCEDURE — 250N000013 HC RX MED GY IP 250 OP 250 PS 637: Performed by: PSYCHIATRY & NEUROLOGY

## 2021-05-20 RX ADMIN — OLANZAPINE 15 MG: 15 TABLET, ORALLY DISINTEGRATING ORAL at 19:14

## 2021-05-20 RX ADMIN — METFORMIN HYDROCHLORIDE 1000 MG: 500 TABLET, FILM COATED ORAL at 17:49

## 2021-05-20 RX ADMIN — GABAPENTIN 300 MG: 300 CAPSULE ORAL at 19:14

## 2021-05-20 RX ADMIN — OLANZAPINE 5 MG: 5 TABLET, FILM COATED ORAL at 08:15

## 2021-05-20 RX ADMIN — GABAPENTIN 300 MG: 300 CAPSULE ORAL at 13:59

## 2021-05-20 RX ADMIN — FLUOXETINE 20 MG: 20 CAPSULE ORAL at 08:15

## 2021-05-20 RX ADMIN — GABAPENTIN 300 MG: 300 CAPSULE ORAL at 08:15

## 2021-05-20 ASSESSMENT — ACTIVITIES OF DAILY LIVING (ADL)
DRESS: SCRUBS (BEHAVIORAL HEALTH)
ORAL_HYGIENE: INDEPENDENT
HYGIENE/GROOMING: HANDWASHING;INDEPENDENT
HYGIENE/GROOMING: INDEPENDENT
LAUNDRY: UNABLE TO COMPLETE
ORAL_HYGIENE: INDEPENDENT
DRESS: SCRUBS (BEHAVIORAL HEALTH)
LAUNDRY: UNABLE TO COMPLETE

## 2021-05-20 NOTE — PLAN OF CARE
Problem: Behavioral Health Plan of Care  Goal: Plan of Care Review  Outcome: Improving  Flowsheets (Taken 5/19/2021 4757)  Plan of Care Reviewed With: patient  Patient Agreement with Plan of Care: agrees     Patient in the milieu majority of the evening calm and social with staff and peers watching basketball on television. Patient upon approach full range in affect, calm and polite during verbal assessment. Patient states overall feeling improved reporting relief from past reported pain and denying all psych symptoms. Patient accepting of current care plan and demonstrates insight into court process, understanding that he has court tomorrow morning. Patient independent and accepting of HS medications with no stated or observed side effects. Patient independent with identifying needs and completing ADL's. Patient had no reports or observed concerns with blood glucose this evening.

## 2021-05-20 NOTE — PROGRESS NOTES
Asked to evaluate diabetes meds. Patient is only on metformin. Would prefer insulin over this, as he notes it upsets his stomach.     #DM2: Last A1C 8.3 on 05/07/2021. PTA on Victoza, Lantus 15 units, metformin 1000 mg BID. Has not filled in quite some time, so metformin only was restarted on admission. Minimal blood glucose checks as he refuses, but AM glucose on 05/17 was great at 130.   *Weight is 113 kg. Weight based dosing would be TDD of 56 units of insulin/day (28 units of long acting, 28 units of short acting divided between meals)  -Would be reasonable to continue only metformin on discharge as BG seems well controlled  -If patient wants to stop metformin, can restart lantus at PTA dose of 15 units, but patient needs to check blood glucose levels at least BID and follow up with PCP for consideration of victoza resumption. Unable to give further recommendations on resumption of other meds due to lack of data and BG checks (discharge order placed)     Medicine will not follow as patient discharging. Please notify on call MISAEL/physician if any acute medical issues arise.    Emilee Willams PA-C

## 2021-05-20 NOTE — PLAN OF CARE
Assessment/Intervention/Current Symtoms and Care Coordination  -Pt's care discussed with treatment team in daily team meeting and chart notes were reviewed  [facility] commitment hearing is today.     - Writer called [facility] - want update on referral  They called back and said they needed notes, writer explained that these requested notes were already faxed to them yesterday.  Faxed them again and called to inform them they were sent again.     - unknown outcome of hearing today.  Likely hear later today/tomorrow.      Discharge Plan or Goal  Discharge to residential MI/CD program [facility]      Barriers to Discharge   Stabilization of symptoms, Petition for commitment through [place] - final hearing on 5/20 and also opening for MI/CD tx     Referral Status  [facility] is reviewing Rule 25.      Legal Status   Court hold

## 2021-05-20 NOTE — PROVIDER NOTIFICATION
05/20/21 0600   Sleep/Rest/Relaxation   Sleep/Rest/Relaxation appears asleep   Night Time # Hours 6.5 hours     Pt had a quiet night with no behavior or safety concerns. Was observed sleeping during the safety checks.

## 2021-05-20 NOTE — PLAN OF CARE
"Pt was out in the milieu at times and selectively social with peers. He participated in court hearing, reports \"I'm not sure\" how court went. Affect blunted. Withdrawn on approach. No irritability or agitation observed. He declined scheduled Lisinopril and metformin, but accepted all other morning medications. Pt unable to state why he was refusing metformin. He later told treatment team it gives him an upset stomach sometimes. Denies SI/SIB and HI. He isolated to his room for most of the afternoon and spent some time napping. Pt denies any other concerns or questions.  "

## 2021-05-21 LAB — GLUCOSE BLDC GLUCOMTR-MCNC: 333 MG/DL (ref 70–99)

## 2021-05-21 PROCEDURE — 250N000013 HC RX MED GY IP 250 OP 250 PS 637: Performed by: PHYSICIAN ASSISTANT

## 2021-05-21 PROCEDURE — 250N000013 HC RX MED GY IP 250 OP 250 PS 637: Performed by: STUDENT IN AN ORGANIZED HEALTH CARE EDUCATION/TRAINING PROGRAM

## 2021-05-21 PROCEDURE — 99232 SBSQ HOSP IP/OBS MODERATE 35: CPT | Mod: GC | Performed by: PSYCHIATRY & NEUROLOGY

## 2021-05-21 PROCEDURE — 999N001017 HC STATISTIC GLUCOSE BY METER IP

## 2021-05-21 PROCEDURE — 250N000013 HC RX MED GY IP 250 OP 250 PS 637: Performed by: PSYCHIATRY & NEUROLOGY

## 2021-05-21 PROCEDURE — 250N000012 HC RX MED GY IP 250 OP 636 PS 637: Performed by: STUDENT IN AN ORGANIZED HEALTH CARE EDUCATION/TRAINING PROGRAM

## 2021-05-21 PROCEDURE — 124N000002 HC R&B MH UMMC

## 2021-05-21 RX ADMIN — GABAPENTIN 300 MG: 300 CAPSULE ORAL at 15:03

## 2021-05-21 RX ADMIN — INSULIN GLARGINE 15 UNITS: 100 INJECTION, SOLUTION SUBCUTANEOUS at 21:41

## 2021-05-21 RX ADMIN — GABAPENTIN 300 MG: 300 CAPSULE ORAL at 08:20

## 2021-05-21 RX ADMIN — FLUOXETINE 20 MG: 20 CAPSULE ORAL at 08:20

## 2021-05-21 RX ADMIN — OLANZAPINE 15 MG: 15 TABLET, ORALLY DISINTEGRATING ORAL at 20:49

## 2021-05-21 RX ADMIN — OLANZAPINE 5 MG: 5 TABLET, FILM COATED ORAL at 08:20

## 2021-05-21 RX ADMIN — GABAPENTIN 300 MG: 300 CAPSULE ORAL at 20:48

## 2021-05-21 ASSESSMENT — ACTIVITIES OF DAILY LIVING (ADL)
HYGIENE/GROOMING: HANDWASHING;SHOWER;INDEPENDENT
ORAL_HYGIENE: INDEPENDENT
HYGIENE/GROOMING: INDEPENDENT
LAUNDRY: UNABLE TO COMPLETE
LAUNDRY: UNABLE TO COMPLETE
ORAL_HYGIENE: INDEPENDENT
DRESS: SCRUBS (BEHAVIORAL HEALTH);INDEPENDENT
DRESS: SCRUBS (BEHAVIORAL HEALTH)

## 2021-05-21 NOTE — PROGRESS NOTES
"[facility], [place]   Psychiatric Progress Note  Hospital Day: 14        Interim History:   The patient's care was discussed with the treatment team during the daily team meeting and/or staff's chart notes were reviewed.  Staff reported that Brody was primarily isolative to room. Calm & cooperative with staff. Slept for 7 hrs. Declined metformin due to stomach upset.     Upon interview today, Brody was sitting out in the lounge (first time this writer has witnessed this). Reports that he's \"making [himself]\" get out of bed, feels quite tired. Mood the same, no SI. With regard to the future, acknowledges some hope, but overall feels like his life \"ended\" about 4.5years ago when his mother and son . Reports that \"God\" helps him get through life/give him hope. Declined spiritual health consult, although informed of this option. Denies hallucinations. Continues to decline metformin due to stomach pain with this, informed we are still waiting to hear back from internal medicine on other DMII treatment options. Continues to be agreeable to discharge to  treatment ([facility]) when a bed is available.          Medications:       FLUoxetine  20 mg Oral Daily     gabapentin  300 mg Oral TID     lisinopril  5 mg Oral Daily     metFORMIN  1,000 mg Oral BID w/meals     OLANZapine  5 mg Oral QAM     OLANZapine zydis  15 mg Oral At Bedtime          Allergies:   No Known Allergies       Labs:     No results found for this or any previous visit (from the past 24 hour(s)).       Psychiatric Examination:     BP (!) 143/80   Pulse 73   Temp 96.1  F (35.6  C) (Tympanic)   Resp 16   SpO2 100%   Weight is 0 lbs 0 oz  There is no height or weight on file to calculate BMI.    Weight over time:  There were no vitals filed for this visit.    Orthostatic Vitals       Most Recent      Lying Orthostatic /66 05 0900    Lying Orthostatic Pulse (bpm) 95 05 0900    Sitting Orthostatic /74 " "05/10 0842    Sitting Orthostatic Pulse (bpm) 115 05/10 0842            Cardiometabolic risk assessment. 05/10/21      Reviewed patient profile for cardiometabolic risk factors    Date taken /Value  REFERENCE RANGE   Abdominal Obesity  (Waist Circumference)   See nursing flowsheet Women ?35 in (88 cm)   Men ?40 in (102 cm)      Triglycerides  Triglycerides   Date Value Ref Range Status   09/28/2020 95 <150 mg/dL Final       ?150 mg/dL (1.7 mmol/L) or current treatment for elevated triglycerides   HDL cholesterol  HDL Cholesterol   Date Value Ref Range Status   09/28/2020 54 >39 mg/dL Final   ]   Women <50 mg/dL (1.3 mmol/L) in women or current treatment for low HDL cholesterol  Men <40 mg/dL (1 mmol/L) in men or current treatment for low HDL cholesterol     Fasting plasma glucose (FPG) Lab Results   Component Value Date     05/07/2021      FPG ?100 mg/dL (5.6 mmol/L) or treatment for elevated blood glucose   Blood pressure  BP Readings from Last 3 Encounters:   05/17/21 (!) 143/80   11/19/20 (!) 149/89   10/30/20 (!) 141/80    Blood pressure ?130/85 mmHg or treatment for elevated blood pressure   Family History  See family history     Appearance: poorly groomed and awake, fatigued. Sitting in chair in lounge  Attitude: Cooperative  Eye Contact: Fair  Mood:  \"fine\"  Affect:  mood congruent, intensity is blunted and restricted range  Speech: Clear and coherent  Language: fluent and intact in English  Psychomotor, Gait, Musculoskeletal:  no evidence of tardive dyskinesia, dystonia, or tics  Throught Process:  linear and goal oriented  Associations:  no loose associations  Thought Content:  No evidence of SI or HI, not responding to internal stimuli, denied AH, denied VH  Insight:  fair  Judgement:  fair  Oriented to:  time, person, and place  Attention Span and Concentration:  fair  Recent and Remote Memory:  intact  Fund of Knowledge:  appropriate    Clinical Global Impressions  First: 5/3 5/13/2021     Most " recent:2             Precautions:     Behavioral Orders   Procedures     Assault precautions     Code 1 - Restrict to Unit     Code 2     FOR CXR on 5/11/21     Elopement precautions     Routine Programming     As clinically indicated     Status 15     Every 15 minutes.     Suicide precautions     Patients on Suicide Precautions should have a Combination Diet ordered that includes a Diet selection(s) AND a Behavioral Tray selection for Safe Tray - with utensils, or Safe Tray - NO utensils            Diagnoses:     Psychosis, unspecified (substance induced vs primary psychotic illness)  Depressive disorder, unspecified (substance induced vs MDD)   PTSD  Anxiety, unspecified  Stimulant Use Disorder, cocaine and amphetamine type, severe  Cannabis Use Disorder, severe  Nicotine Use Disorder, moderate  Extensive history of malingering per chart review  R/O Antisocial personality disorder  Type II Diabetes  Diabetic peripheral neuropathy  HTN         Assessment & Plan:     Assessment:  This patient is a 60 year old -American/Black male with history PTSD, depression, psychosis, polysubstance use (primarily cocaine) who presented to [facility] ED with active SI and plan to overdose or run into traffic, severe symptoms of psychosis and aggressive behavior toward others in context of medication non-adherence, homelessness, methamphetamine and cocaine use/intoxication.     The above is in the context of multiple hospitalizations for similar symptoms. Current symptoms are consistent with unspecified psychosis and depression, unclear if secondary to substances vs primary disorders.  Petition for commitment with [facility] was filed at outside ED. Patient will likely benefit from CD treatment upon discharge.    Hospital summary: Initiated zyprexa on day of admission for treatment of psychosis and mood, 10mg BID. Started prozac 10mg on 5/10 due to symptoms of depression, history of mood improvement on this medication.  Initially during hospital stay had nausea and vomiting thought 2/2 withdrawal syndrome, and after nausea had resolved, had persistent decreased appetite, and was monitored for fluid intake/output, particularly given diabetes diagnosis. Due to continued decreased appetite + abdominal pain, internal medicine consulted on 5/13. Due to insomnia, changed zyprexa dosing from 10mg BID to 5mg + 15mg at bedtime. Increased prozac to 20mg on 5/18 for continued support in managing depressive symptoms. Over the weekend of 5/14/-5/17, was noted to be more active in the milieu, appetite improved. Added gabapentin 300mg TID for neuropathic pain (presumed peripheral neuropathy 2/2 DMII) to good effect 5/18. Discontinued metformin on 5/21 due to Brody declining this due to stomach pain as a side effect. Curbsided medicine, who recommended starting his PTA insulin glargine 15mg in place of metformin.     Changes today:  -per curbside with medicine: start insulin glargine 15mg at bedtime (with BID glucose checks)  -discontinue metformin (d/t stomach pain side effect)    Target psychiatric symptoms and interventions:  #Unspecified psychosis (substance-induced vs schizophrenia vs schizoaffective disorder)  #Unspecified depressive disorder (MDD vs substance-induced mood disorder)  #stimulant use disorder  #stimulant use withdrawal  -zyprexa 5mg qam + 15mg at betime  -prozac 20mg daily    Risks, benefits, and alternatives discussed at length with patient.     Acute Medical Problems and Treatments:   #nausea and vomiting  #abdominal pain  #anorexia  -medicine consult x2: thought most likely 2/2 withdrawal syndrome initially. Repeat medicine consult on 5/13 due to continued abdominal pain, anorexia.  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 Recommend f/u with infectious disease as outpatient, order placed for  discharge    Chronic conditions:  #DMII  #HTN  -medicine consulted, some PTA medications continued      Behavioral/Psychological/Social:  - Encourage unit programming    Safety:  - Continue precautions as noted above  - Status 15 minute checks    Legal Status: court hold    Disposition Plan   Reason for ongoing admission: poses an imminent risk to self and is unable to care for self due to severe psychosis or artem  Discharge location: Chemical dependency treatment facility  Discharge Medications: ordered.  Follow-up Appointments: not scheduled    Susana Szymanski MD  PGY-2     Patient seen and staffed with attending psychiatrist, Emilee Rao MD

## 2021-05-21 NOTE — PLAN OF CARE
Problem: Behavioral Health Plan of Care  Goal: Plan of Care Review  Outcome: No Change  Flowsheets (Taken 5/20/2021 4736)  Plan of Care Reviewed With: patient  Patient Agreement with Plan of Care: agrees      Patient in the milieu majority of the evening mainly isolative to self watching movies. Patient upon approach blunted and at times seeming tense and short with responses during verbal assessment. Patient denies any current symptoms. Patient accepting of HS medications. Patient reporting stomach discomfort that he relates to the metformin. Patient requesting to no longer receive metformin. Patient ADL's appear poorly kept and not receptive of staff prompting.

## 2021-05-21 NOTE — PLAN OF CARE
Assessment/Intervention/Current Symtoms and Care Coordination  -Pt's care discussed with treatment team in daily team meeting and chart notes were reviewed  [facility] Final commitment hearing is today.      - Pt will discharge Monday morning to [facility].  They will pick him up at the [facility] entrance between 10:30/11am.  They will call the nurse line when they arrive.      - writer called [facility] to follow-up on court hearing yesterday.  They sent court order.  Pt is committed MI/CD w/ lawson order to the commissioner of human services.  He will need a provisional discharge when he leaves and goes to treatment. PD was prepped.      Discharge Plan or Goal  Discharge to residential MI/CD program [facility]      Barriers to Discharge   Treatment center reviewing.      Referral Status  [facility] is reviewing Rule 25.      Legal Status   Court hold

## 2021-05-21 NOTE — PLAN OF CARE
Problem: Behavioral Health Plan of Care  Goal: Adheres to Safety Considerations for Self and Others  Outcome: No Change     Problem: Behavioral Health Plan of Care  Goal: Absence of New-Onset Illness or Injury  Outcome: No Change     Problem: Behavioral Health Plan of Care  Goal: Optimized Coping Skills in Response to Life Stressors  Outcome: No Change     Pt presented as alert and oriented to place and self throughout the shift.  He was primarily isolative to his room throughout the day.  Pt appeared slightly untidy, needing cues from staff to perform ADLs.  Speech was clear and coherent.  Pt ate meals and was compliant with some of his medications.  Pt refused vital sign check in the morning and did not receive BP medication.  He also refused his Metformin, stating that he believes that it is the cause of his previous stomach pain.  Pt denied SI/HI/SIB.  He denied psychosis.  Pt denies any acute physical health concerns, pain, or side effects to the medications that he is compliant with.

## 2021-05-21 NOTE — PLAN OF CARE
Problem: Sleep Disturbance  Goal: Adequate Sleep/Rest  Outcome: Improving     Patient appeared to be asleep for 7 hours during safety checks this shift. No complaints or concerns voiced by patient or noted by staff. Will continue to monitor and update if there are changes.

## 2021-05-22 LAB
GLUCOSE BLDC GLUCOMTR-MCNC: 147 MG/DL (ref 70–99)
GLUCOSE BLDC GLUCOMTR-MCNC: 357 MG/DL (ref 70–99)

## 2021-05-22 PROCEDURE — 250N000013 HC RX MED GY IP 250 OP 250 PS 637: Performed by: PHYSICIAN ASSISTANT

## 2021-05-22 PROCEDURE — 99231 SBSQ HOSP IP/OBS SF/LOW 25: CPT | Performed by: PHYSICIAN ASSISTANT

## 2021-05-22 PROCEDURE — 250N000013 HC RX MED GY IP 250 OP 250 PS 637: Performed by: PSYCHIATRY & NEUROLOGY

## 2021-05-22 PROCEDURE — 250N000013 HC RX MED GY IP 250 OP 250 PS 637: Performed by: STUDENT IN AN ORGANIZED HEALTH CARE EDUCATION/TRAINING PROGRAM

## 2021-05-22 PROCEDURE — 124N000002 HC R&B MH UMMC

## 2021-05-22 PROCEDURE — 999N001017 HC STATISTIC GLUCOSE BY METER IP

## 2021-05-22 RX ADMIN — FLUOXETINE 20 MG: 20 CAPSULE ORAL at 09:15

## 2021-05-22 RX ADMIN — GABAPENTIN 300 MG: 300 CAPSULE ORAL at 19:52

## 2021-05-22 RX ADMIN — GABAPENTIN 300 MG: 300 CAPSULE ORAL at 09:15

## 2021-05-22 RX ADMIN — GABAPENTIN 300 MG: 300 CAPSULE ORAL at 14:45

## 2021-05-22 RX ADMIN — LISINOPRIL 5 MG: 5 TABLET ORAL at 09:15

## 2021-05-22 RX ADMIN — OLANZAPINE 15 MG: 15 TABLET, ORALLY DISINTEGRATING ORAL at 19:52

## 2021-05-22 RX ADMIN — INSULIN GLARGINE 15 UNITS: 100 INJECTION, SOLUTION SUBCUTANEOUS at 19:53

## 2021-05-22 RX ADMIN — OLANZAPINE 5 MG: 5 TABLET, FILM COATED ORAL at 09:15

## 2021-05-22 ASSESSMENT — ACTIVITIES OF DAILY LIVING (ADL)
LAUNDRY: UNABLE TO COMPLETE
HYGIENE/GROOMING: INDEPENDENT
DRESS: SCRUBS (BEHAVIORAL HEALTH)
HYGIENE/GROOMING: HANDWASHING;SHOWER;INDEPENDENT
ORAL_HYGIENE: INDEPENDENT
DRESS: SCRUBS (BEHAVIORAL HEALTH)
ORAL_HYGIENE: INDEPENDENT

## 2021-05-22 NOTE — PLAN OF CARE
Problem: Sleep Disturbance  Goal: Adequate Sleep/Rest  Outcome: Improving     Patient appeared to be asleep for 6 hours during safety checks this shift. No complaints or concerns voiced by patient or noted by staff. Will continue to monitor and update if there are changes.

## 2021-05-22 NOTE — PLAN OF CARE
The patient stayed in his room the vast majority of the shift. He took all his scheduled medications and completed BG checks as recommended. He decline further discussion of mental health symptoms or treatment. He stays in his room with the lights off most of the shift. He placed a sign on the door asking not to be disturbed as he wanted to sleep. He did not interact with staff much this shift. He was irritable during brief interactions with RN writer. No physical health symptoms or side effects from medications noted this shift. His VS were WDL. His BG was normal.

## 2021-05-22 NOTE — PLAN OF CARE
Problem: Behavioral Health Plan of Care  Goal: Plan of Care Review  Outcome: No Change  Flowsheets (Taken 5/21/2021 5442 by Susana Saravia RN)  Plan of Care Reviewed With: patient  Patient Agreement with Plan of Care: agrees     Patient in the milieu all evening mainly keeping to himself watching movies. Patient upon approach blunted and flat in affect but cooperative with assessment. Patient reporting relief with placement and discharge plans for Monday. Patient denied any psych symptoms with none observed this evening. Patient independent and accepting of HS medications. Patient cooperative with vital's and blood glucose assessment. Patient independent in identifying needs and completing ADL's.

## 2021-05-22 NOTE — CONSULTS
"Reviewed overnight STAT consult for \"hyperglycemia, BG of 333\".    Does not appear that psychiatry/primary team contacted medicine overnight to discuss.     See this authors note from 05/20/2021. Was contacted by psychiatry resident 05/20 as patient did not want to use metformin due to GI upset, he preferred insulin which he has taken in the past. Did not receive his metformin 05/21. He received his first dose of lantus last night.     Patient interviewed. He is lying in bed. He notes he doesn't like metformin as it makes him nauseated, no vomiting, no diarrhea. Prefers insulin which he has been on before. Doesn't particularly like checking BG, but agreeable to check at least twice daily while on insulin. Educated about importance of ensuring BG control, and ensuring his dosing is correct. He will follow up with his PCP. Denies any acute physical concerns, otherwise feeling well. Plan to discharge to treatment on Monday.     /79   Pulse 80   Temp 97.9  F (36.6  C) (Tympanic)   Resp 16   SpO2 99%   GEN: Well appearing male, lying comfortably in bed,    #DM2: Last A1C 8.3 on 05/07/2021. PTA on Victoza, Lantus 15 units, metformin 1000 mg BID. Has not filled in quite some time, so metformin only was restarted on admission. Transitioned to lantus last evening due to patient preference.  this AM.   *Weight is 113 kg. Weight based dosing would be TDD of 56 units of insulin/day (28 units of long acting, 28 units of short acting divided between meals)  -Continue lantus at PTA dose of 15 units  -Check BG BID  -Hypoglycemia protocol  -Notify medicine if <70 or consistently >250  -Patient verified with this writer that he has meter and supplies, agrees to check BG at least twice daily  -Ensure patient has PCP f/u with in 1 week    Medicine will peripherally follow BG checks.    Emilee Willams PA-C     Time Spent on this Encounter   I spent 15 minutes on the unit/floor managing the care of Brody Chung " Bernadine. Over 50% of my time was spent on the following:   - Counseling the patient and/or family regarding: diagnostic results, risks and benefits of treatment options, recommended follow-up and medical compliance with diabetes.  - Coordination of care with the: nurse and patient    Emilee Willams PA-C

## 2021-05-22 NOTE — PROGRESS NOTES
RN writer observed a blood glucose of 333 with patient denying and not demonstrating any symptoms of hyperglycemia. On call provider Dr. Harp notified and ordered for a stat internal medicine consult. Order placed and page sent out. Patient requested HS ordered Lantus 15 mg which he was provided. He requested not to be woke up for any additional medications if ordered. Will continue to monitor.

## 2021-05-23 LAB
GLUCOSE BLDC GLUCOMTR-MCNC: 158 MG/DL (ref 70–99)
GLUCOSE BLDC GLUCOMTR-MCNC: 364 MG/DL (ref 70–99)

## 2021-05-23 PROCEDURE — 250N000013 HC RX MED GY IP 250 OP 250 PS 637: Performed by: PSYCHIATRY & NEUROLOGY

## 2021-05-23 PROCEDURE — 250N000013 HC RX MED GY IP 250 OP 250 PS 637: Performed by: STUDENT IN AN ORGANIZED HEALTH CARE EDUCATION/TRAINING PROGRAM

## 2021-05-23 PROCEDURE — 124N000002 HC R&B MH UMMC

## 2021-05-23 PROCEDURE — 999N001017 HC STATISTIC GLUCOSE BY METER IP

## 2021-05-23 PROCEDURE — 250N000013 HC RX MED GY IP 250 OP 250 PS 637: Performed by: PHYSICIAN ASSISTANT

## 2021-05-23 RX ADMIN — GABAPENTIN 300 MG: 300 CAPSULE ORAL at 08:36

## 2021-05-23 RX ADMIN — OLANZAPINE 15 MG: 15 TABLET, ORALLY DISINTEGRATING ORAL at 20:22

## 2021-05-23 RX ADMIN — GABAPENTIN 300 MG: 300 CAPSULE ORAL at 14:40

## 2021-05-23 RX ADMIN — FLUOXETINE 20 MG: 20 CAPSULE ORAL at 08:36

## 2021-05-23 RX ADMIN — GABAPENTIN 300 MG: 300 CAPSULE ORAL at 20:22

## 2021-05-23 RX ADMIN — LISINOPRIL 5 MG: 5 TABLET ORAL at 08:36

## 2021-05-23 RX ADMIN — INSULIN GLARGINE 15 UNITS: 100 INJECTION, SOLUTION SUBCUTANEOUS at 20:25

## 2021-05-23 RX ADMIN — OLANZAPINE 5 MG: 5 TABLET, FILM COATED ORAL at 08:36

## 2021-05-23 ASSESSMENT — ACTIVITIES OF DAILY LIVING (ADL)
HYGIENE/GROOMING: HANDWASHING;INDEPENDENT
LAUNDRY: UNABLE TO COMPLETE
ORAL_HYGIENE: INDEPENDENT
DRESS: SCRUBS (BEHAVIORAL HEALTH)

## 2021-05-23 NOTE — PLAN OF CARE
Problem: Behavioral Health Plan of Care  Goal: Plan of Care Review  Outcome: No Change  Flowsheets  Taken 5/22/2021 2044  Progress: no change  Taken 5/22/2021 1900  Plan of Care Reviewed With: patient  Patient Agreement with Plan of Care: agrees    Patient in the milieu majority of the evening watching television occasionally social with staff and peers. Patient upon approach blunted and slightly irritable and short during verbal assessment. Patient denies any symptoms, reporting though the he feels the medications are helping his mood. Patient independent with requesting and accepting of HS glucose checks and medications. Patient glucose was 357 upon assessment, internal medicine provider notes were reviewed and scheduled Lantus was given. RN writer attempted to offer education to patient for diabetic management and diet to maintain safe blood sugar but patient refused education. Patient independent in identifying needs and completing ADL's.

## 2021-05-23 NOTE — PLAN OF CARE
The patient continues to be generally isolative and chooses to spend most of the time in his room watching TV or sleeping. He states that he is ready to leave. He denies any psychiatric or physical health concerns this shift. He is generally irritable but cooperative. He completed his BG check with prompting and took all medications without issue. No emergent physical health concerns or side effects from medications noted this shift. His VS were WDL.

## 2021-05-23 NOTE — PROGRESS NOTES
Patient requesting bedtime medications including glucose check and ordered Lantus insulin. Upon assessment blood glucose of 357 was found with no observed or reported symptoms of hyperglycemia. Upon review of today's internal medicine consult indicates to notify if patient is consistently over 250, review shows his AM blood glucose was 147. Patient provided with HS Lantus. Will continue to monitor.
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
## 2021-05-24 ENCOUNTER — TRANSFERRED RECORDS (OUTPATIENT)
Dept: HEALTH INFORMATION MANAGEMENT | Facility: CLINIC | Age: 61
End: 2021-05-24

## 2021-05-24 VITALS
HEART RATE: 73 BPM | OXYGEN SATURATION: 100 % | RESPIRATION RATE: 16 BRPM | DIASTOLIC BLOOD PRESSURE: 77 MMHG | TEMPERATURE: 96.8 F | SYSTOLIC BLOOD PRESSURE: 128 MMHG

## 2021-05-24 LAB — GLUCOSE BLDC GLUCOMTR-MCNC: 158 MG/DL (ref 70–99)

## 2021-05-24 PROCEDURE — 250N000013 HC RX MED GY IP 250 OP 250 PS 637: Performed by: STUDENT IN AN ORGANIZED HEALTH CARE EDUCATION/TRAINING PROGRAM

## 2021-05-24 PROCEDURE — 250N000013 HC RX MED GY IP 250 OP 250 PS 637: Performed by: PSYCHIATRY & NEUROLOGY

## 2021-05-24 PROCEDURE — 250N000013 HC RX MED GY IP 250 OP 250 PS 637: Performed by: PHYSICIAN ASSISTANT

## 2021-05-24 PROCEDURE — 99239 HOSP IP/OBS DSCHRG MGMT >30: CPT | Mod: GC | Performed by: PSYCHIATRY & NEUROLOGY

## 2021-05-24 PROCEDURE — 999N001017 HC STATISTIC GLUCOSE BY METER IP

## 2021-05-24 RX ADMIN — LISINOPRIL 5 MG: 5 TABLET ORAL at 08:21

## 2021-05-24 RX ADMIN — GABAPENTIN 300 MG: 300 CAPSULE ORAL at 08:21

## 2021-05-24 RX ADMIN — OLANZAPINE 5 MG: 5 TABLET, FILM COATED ORAL at 08:21

## 2021-05-24 RX ADMIN — FLUOXETINE 20 MG: 20 CAPSULE ORAL at 08:21

## 2021-05-24 NOTE — PLAN OF CARE
The patient's current status is ready for discharge. He presents as slightly brighter and more conversant this morning. He expresses readiness for discharge and indicates that the plan is safe and workable for him. He expresses reasonable understanding of his follow-up care. He denies any safety concerns at this time. He denies SI/SIB/HI. He denies any physical health concerns. His BG was reasonable and his VS were WDL. No other acute concerns noted at this time. Pt will be picked up by staff from the [facility] treatment facility.

## 2021-05-24 NOTE — DISCHARGE INSTRUCTIONS
Behavioral Discharge Planning and Instructions    Summary: You were admitted on 5/7/2021  due to Psychotic Symptomology and Chemical Use Issues.  You were treated by Dr. Emilee Rao and discharged on 5/24/2021[facility]     [facility] You are being discharged on a Provisional Discharge Agreement which shall remain in effect for the duration of the Commitment.  Your Commitment expires on 11/21/2021.  You were also court ordered to take the medications the doctor ordered for you.     Main Diagnosis:   Psychosis, unspecified (substance induced vs primary psychotic illness)  Depressive disorder, unspecified (substance induced vs MDD)   PTSD  Anxiety, unspecified  Stimulant Use Disorder, cocaine and amphetamine type, severe  Cannabis Use Disorder, severe  Nicotine Use Disorder, moderate  Extensive history of malingering per chart review  R/O Antisocial personality disorder  Type II Diabetes  Diabetic peripheral neuropathy  HTN      Health Care Follow-up:   - You are discharging to the [facility]CD Residential Treatment Program: [facility]   They will pick you up and transport you  [address] [place] [place] [address]  Phone: [phone]  Fax: [phone]    -Primary Care for Diabetes Care:  Writer spoke to director of [facility].  They have a local clinic that they work with for all of their patients and will have you seen there within a week of you admitting with them.     - [facility] :  Stephenie Adkins  Phone: [phone]  Email: [email or web address]    Attend all scheduled appointments with your outpatient providers. Call at least 24 hours in advance if you need to reschedule an appointment to ensure continued access to your outpatient providers.     Major Treatments, Procedures and  "Findings:  You were provided with: a psychiatric assessment, assessed for medical stability, medication evaluation and/or management, group therapy, CD evaluation/assessment and milieu management    Symptoms to Report: feeling more aggressive, increased confusion, losing more sleep, mood getting worse or thoughts of suicide    Early warning signs can include: increased depression or anxiety sleep disturbances increased thoughts or behaviors of suicide or self-harm  increased unusual thinking, such as paranoia or hearing voices    Safety and Wellness:  Take all medicines as directed.  Make no changes unless your doctor suggests them.      Follow treatment recommendations.  Refrain from alcohol and non-prescribed drugs.  If there is a concern for safety, call 911.    Resources:   Crisis Intervention: 218.279.6168 or 295-614-7740 (TTY: 929.326.5827).  Call anytime for help.  [employer] [place] on Mental Illness (www.[place].isamar.org): 959.339.2056 or 916-772-5195.  [place] Association for Children's Mental Health (www.mac.org): 638.563.7351.  Alcoholics Anonymous (www.alcoholics-anonymous.org): Check your phone book for your local chapter.  Suicide Awareness Voices of Education (SAVE) (www.save.org): 102-296-GTAM (1941)  National Suicide Prevention Line (www.mentalhealthmn.org): 950-552-KFZK (8828)  Mental Health Consumer/Survivor Network of [place] (www.mhcsn.net): 800.324.3464 or 260-965-9460  Mental Health Association of [place] (www.mentalhealth.org): 492.611.5419 or 880-409-1188  Self- Management and Recovery Training., SMART-- Toll free: 864.580.9959  www.[employer].org  [facility] Crisis Response 947 722-3069  [facility] Crisis Response 361-726-3571  [facility] Crisis Response [phone]  [facility] Crisis (COPE) Response - Adult [phone]  [facility] Crisis Response - Adult [phone]  [facility] Rapid Response 721-656-8469  Text 4 Life: txt \"LIFE\" to 29677 for immediate support and crisis " "intervention  Crisis text line: Text \"[place]\" to 468636. Free, confidential, 24/7.  Crisis Intervention: 493.141.9736 or 788-308-0281. Call anytime for help.   [facility] Mental Health Crisis Team - Child: [phone]  [facility] Children's Mental Health Crisis Response Team - Child: [phone]  [facility] Mental Health Crisis: 1-262.682.7213   [facility] Mental Health Crisis Team:  [phone]  [facility], and [facility] Crisis Response Team (CRT):  [phone] or [phone]     General Medication Instructions:   See your medication sheet(s) for instructions.   Take all medicines as directed.  Make no changes unless your doctor suggests them.   Go to all your doctor visits.  Be sure to have all your required lab tests. This way, your medicines can be refilled on time.  Do not use any drugs not prescribed by your doctor.  Avoid alcohol.    Advance Directives:   Scanned document on file with [employer]? No scanned doc  Is document scanned? Pt states no documents  Honoring Choices Your Rights Handout: Informed and given  Was more information offered? Materials given    The Treatment team has appreciated the opportunity to work with you. If you have any questions or concerns about your recent admission, you can contact the unit which can receive your call 24 hours a day, 7 days a week. They will be able to get in touch with a Provider if needed. The unit number is [phone] .      "

## 2021-05-24 NOTE — DISCHARGE SUMMARY
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 tablet (50 mg) by   mouth daily 30 tablet 0 Unknown at Unknown time     thin (NO BRAND SPECIFIED) lancets Use with lanceting device. To   accompany: Blood Glucose Monitor Brands: per insurance. 120 each   6 Unknown at Unknown time     traZODone (DESYREL) 100 MG tablet Take 1 tablet (100 mg) by   mouth At Bedtime 30 tablet 0 Unknown at Unknown time     Vitamin D3 (CHOLECALCIFEROL) 25 mcg (1000 units) tablet Take 2   tablets (50 mcg) by mouth daily 60 tablet 0 Unknown at Unknown   time       Allergies   No Known Allergies    Physical Exam   Vital Signs: Temp: 98.7  F (37.1  C) Temp src: Oral BP: 123/76   Pulse: 84   Resp: 16 SpO2: 99 %      Weight: 0 lbs 0 oz    Constitutional: Awake and alert, in no apparent distress. Lying   comfortably in bed.   Eyes: Sclera clear, anicteric   Respiratory: Breathing non-labored. CTAB.   Cardiovascular:  RRR, normal S1/S2. No rubs or murmurs.   GI: Soft, non-tender, non-distended. Normoactive bowel sounds.   Skin:  Good color. No jaundice. No visible  "rashes, lesions, or   bruising of concern.   Neurologic: Alert and oriented. No focal deficits.     Data   ROUTINE IP LABS (Last four results)  No lab results found in last 7 days.  No lab results found in last 7 days.  No lab results found in last 7 days.     Glucose Values Latest Ref Rng & Units 5/7/2021   Bedside Glucose (mg/dl )  - --   GLUCOSE 70 - 99 mg/dL 187(H)   Some recent data might be hidden               Internal Medicine Adult IP Consult for [facility] General in [facility]: Patient to be seen: Routine within 24 hrs; Call back #: cell [phone]; abdominal pain + lack of appetite of unclear etiology; Consultant may enter orders: Yes; Requesting pr...     Status: None ()    Narrative    Laurence Serrano PA-C     5/13/2021  2:58 PM  Brief Consult    Contacted for consult regarding patient complained to   psychiatrist about abdominal pain    On exam patient notes pain \"in my stomach\" and does not offer a   specific location, but when suggested \"is it near your belly   button\" he answers yes and states it is dull 6/10, non-radiating   and without known agrevating factors and alleviated with   sleeping.      Denies F/C, N/V, bleeding (including hematuria, melena,   hematochezia, hemoptysis) or other complaitns     Today's vital signs, medications, and nursing notes were   reviewed. Labs reviewed.     /73   Pulse 86   Temp 97.1  F (36.2  C)   Resp 16     SpO2 99%   General: A&O. NAD. Resting on right side in bed under covers  Cv: refused  PULM: nonlabored breathing, refused further exam  HEENT: NC AT  GI: refused  BACK: refused  Extremities: refused      A/P:  Periumbilical abdominal pain  No red-flag signs or symptoms and is not c/w acute abdomen  - continue to trend vitals  - encourage po hydration   - ensure bowel regularity  - bladder scan prn suprapubic pain or lack of void >6h while   awake  - hold off on labs given lack of signs/symptoms    DM2  Hyperglycemia  Recent Labs   Lab " 05/12/21  1735 05/11/21  1715 05/11/21  0813 05/10/21  0824 05/09/21  1704 05/08/21  2000 05/07/21  1625 05/07/21  1625   GLC  --   --   --   --   --   --   --  234*   * 209* 176* 192* 206* 187*   < >  --     < > = values in this interval not displayed.   (please see also note by Luisana Cheatham PA-C 5/12/21)  - ok to decrease POCT glucose checks to BID  - continue metformin    Medicine will sign off at this time, please call with questions   or concerns    Laurence Serrano PA-C  [facility]  Contact information available via [facility] Paging/Directory       Chemical Dependency IP Consult     Status: None ()    Ashley Silveira LADC     5/14/2021  1:19 PM  5/14/2021    CD consult completed.   Pt would like to return to [facility] for treatment.     Recommendations:   1. Abstain from all non-prescribed mood-altering substances  2. Take all medications as prescribed  3. Enter and complete a co-occurring residential or inpatient   treatment program  4. Follow all recommendations upon discharge from treatment.   Recommendations may include, but are not limited to: extended   treatment, outpatient treatment and/or sober housing.  5. Follow all recommendations of your medical providers  6. Follow all recommendations/conditions of Commitment    Referrals:  [facility] Team for Referrals  Phone: [phone]  Fax: [phone]  [facility]  [address], [place] [id number]  Phone: [phone]  fax: [phone]    EDWIN consult completed by: WES Goldman.  Phone Number: [phone]   E-mail Address: [email or web address] Services Evaluation   Department  [address] [place] [id number]   Internal Medicine Adult IP Consult for BEH General in [facility]: Patient to be seen: STAT within 1 hr; Call back #: [phone]; hyperglycemia, ; Consultant may  "enter orders: Yes; Requesting provider? Hospitalist (if different from atten...     Status: None ()    Narrative    Emilee Flores PA-C     5/22/2021 11:30 AM  Reviewed overnight STAT consult for \"hyperglycemia, BG of 333\".    Does not appear that psychiatry/primary team contacted medicine   overnight to discuss.     See this authors note from 05/20/2021. Was contacted by   psychiatry resident 05/20 as patient did not want to use   metformin due to GI upset, he preferred insulin which he has   taken in the past. Did not receive his metformin 05/21. He   received his first dose of lantus last night.     Patient interviewed. He is lying in bed. He notes he doesn't like   metformin as it makes him nauseated, no vomiting, no diarrhea.   Prefers insulin which he has been on before. Doesn't particularly   like checking BG, but agreeable to check at least twice daily   while on insulin. Educated about importance of ensuring BG   control, and ensuring his dosing is correct. He will follow up   with his PCP. Denies any acute physical concerns, otherwise   feeling well. Plan to discharge to treatment on Monday.     /79   Pulse 80   Temp 97.9  F (36.6  C) (Tympanic)     Resp 16   SpO2 99%   GEN: Well appearing male, lying comfortably in bed,    #DM2: Last A1C 8.3 on 05/07/2021. PTA on Victoza, Lantus 15   units, metformin 1000 mg BID. Has not filled in quite some time,   so metformin only was restarted on admission. Transitioned to   lantus last evening due to patient preference.  this AM.   *Weight is 113 kg. Weight based dosing would be TDD of 56 units   of insulin/day (28 units of long acting, 28 units of short acting   divided between meals)  -Continue lantus at PTA dose of 15 units  -Check BG BID  -Hypoglycemia protocol  -Notify medicine if <70 or consistently >250  -Patient verified with this writer that he has meter and   supplies, agrees to check BG at least twice daily  -Ensure patient has " PCP f/u with in 1 week    Medicine will peripherally follow BG checks.    Emilee Willams PA-C     Time Spent on this Encounter   I spent 15 minutes on the unit/floor managing the care of Brody Colindres. Over 50% of my time was spent on the following:   - Counseling the patient and/or family regarding: diagnostic   results, risks and benefits of treatment options, recommended   follow-up and medical compliance with diabetes.  - Coordination of care with the: nurse and patient    Emilee Willams PA-C

## 2021-05-24 NOTE — PROVIDER NOTIFICATION
05/24/21 0600   Sleep/Rest/Relaxation   Sleep/Rest/Relaxation (WDL) WDL   Sleep/Rest/Relaxation appears asleep   Night Time # Hours 5.75 hours   NOC Shift Report    Pt in bed at beginning of shift, breathing quiet and unlabored. Pt slept through shift. Pt slept 5.75 hours.  No pt complaints or concerns at this time. No PRNs given. Will continue to monitor.

## 2021-05-24 NOTE — PLAN OF CARE
Problem: Behavioral Health Plan of Care  Goal: Plan of Care Review  Outcome: No Change  Flowsheets (Taken 5/23/2021 1800)  Plan of Care Reviewed With: patient  Patient Agreement with Plan of Care: agrees     Patient isolative to self in room majority of the evening. Patient upon approach calm and cooperative with verbal assessment. Patient reports improved mood and anticipation of discharge tomorrow denying any symptoms. Patient independent with requesting and accepting of HS medications with no stated or observed side effects. Patient cooperative with glucose checks which were 364 this evening, which appears to be on trend with past evening levels which appear to decrease with scheduled Lantus. Patient independent in identifying needs and completing ADL's.
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
## 2024-08-13 NOTE — PROGRESS NOTES
"[facility], [place]   Psychiatric Progress Note  Hospital Day: 13        Interim History:   The patient's care was discussed with the treatment team during the daily team meeting and/or staff's chart notes were reviewed.  Staff reported that Brody was pleasant with staff and peers when in the milieu, primarily isolative to room. Reported that pain improved with medication changes (gabapentin), denied all MH symptoms. Has insight into court proceedings.     Upon interview today, Brody reports that court was \"fine,\" no specific questions. Did inform him of likely stay of commitment and what that entails. Spoke about metformin as he declined this in the AM-he shared that he intermittently has stomach pain from this medication, and wants to switch it out with a different medicine. Informed him that we would reach out to medicine for any ideas. Otherwise no additional physical concerns. Mood \"fine,\" no changes. No SI, no paranoia. No additional questions or concerns. Continues to be agreeable to discharge to [facility].          Medications:       FLUoxetine  20 mg Oral Daily     gabapentin  300 mg Oral TID     lisinopril  5 mg Oral Daily     metFORMIN  1,000 mg Oral BID w/meals     OLANZapine  5 mg Oral QAM     OLANZapine zydis  15 mg Oral At Bedtime          Allergies:   No Known Allergies       Labs:     No results found for this or any previous visit (from the past 24 hour(s)).       Psychiatric Examination:     BP (!) 143/80   Pulse 73   Temp 96.1  F (35.6  C) (Tympanic)   Resp 16   SpO2 100%   Weight is 0 lbs 0 oz  There is no height or weight on file to calculate BMI.    Weight over time:  There were no vitals filed for this visit.    Orthostatic Vitals       Most Recent      Lying Orthostatic /66 05/09 0900    Lying Orthostatic Pulse (bpm) 95 05/09 0900    Sitting Orthostatic /74 05/10 0842    Sitting Orthostatic Pulse (bpm) 115 05/10 0842            Cardiometabolic " "risk assessment. 05/10/21      Reviewed patient profile for cardiometabolic risk factors    Date taken /Value  REFERENCE RANGE   Abdominal Obesity  (Waist Circumference)   See nursing flowsheet Women ?35 in (88 cm)   Men ?40 in (102 cm)      Triglycerides  Triglycerides   Date Value Ref Range Status   09/28/2020 95 <150 mg/dL Final       ?150 mg/dL (1.7 mmol/L) or current treatment for elevated triglycerides   HDL cholesterol  HDL Cholesterol   Date Value Ref Range Status   09/28/2020 54 >39 mg/dL Final   ]   Women <50 mg/dL (1.3 mmol/L) in women or current treatment for low HDL cholesterol  Men <40 mg/dL (1 mmol/L) in men or current treatment for low HDL cholesterol     Fasting plasma glucose (FPG) Lab Results   Component Value Date     05/07/2021      FPG ?100 mg/dL (5.6 mmol/L) or treatment for elevated blood glucose   Blood pressure  BP Readings from Last 3 Encounters:   05/17/21 (!) 143/80   11/19/20 (!) 149/89   10/30/20 (!) 141/80    Blood pressure ?130/85 mmHg or treatment for elevated blood pressure   Family History  See family history     Appearance: poorly groomed and awake, fatigued. Lying in bed with covers around him  Attitude: Cooperative  Eye Contact: Fair  Mood:  \"fine\"  Affect:  mood congruent, intensity is blunted and restricted range  Speech: Clear and coherent  Language: fluent and intact in English  Psychomotor, Gait, Musculoskeletal:  no evidence of tardive dyskinesia, dystonia, or tics  Throught Process:  linear and goal oriented  Associations:  no loose associations  Thought Content:  No evidence of SI or HI, not responding to internal stimuli, denied AH, denied VH  Insight:  fair  Judgement:  fair  Oriented to:  time, person, and place  Attention Span and Concentration:  fair  Recent and Remote Memory:  intact  Fund of Knowledge:  appropriate    Clinical Global Impressions  First: 5/3 5/13/2021     Most recent:2             Precautions:     Behavioral Orders   Procedures     Assault " precautions     Code 1 - Restrict to Unit     Code 2     FOR CXR on 5/11/21     Elopement precautions     Routine Programming     As clinically indicated     Status 15     Every 15 minutes.     Suicide precautions     Patients on Suicide Precautions should have a Combination Diet ordered that includes a Diet selection(s) AND a Behavioral Tray selection for Safe Tray - with utensils, or Safe Tray - NO utensils            Diagnoses:     Psychosis, unspecified (substance induced vs primary psychotic illness)  Depressive disorder, unspecified (substance induced vs MDD)   PTSD  Anxiety, unspecified  Stimulant Use Disorder, cocaine and amphetamine type, severe  Cannabis Use Disorder, severe  Nicotine Use Disorder, moderate  Extensive history of malingering per chart review  R/O Antisocial personality disorder  Type II Diabetes  Diabetic peripheral neuropathy  HTN         Assessment & Plan:     Assessment:  This patient is a 60 year old -American/Black male with history PTSD, depression, psychosis, polysubstance use (primarily cocaine) who presented to [facility] ED with active SI and plan to overdose or run into traffic, severe symptoms of psychosis and aggressive behavior toward others in context of medication non-adherence, homelessness, methamphetamine and cocaine use/intoxication.     The above is in the context of multiple hospitalizations for similar symptoms. Current symptoms are consistent with unspecified psychosis and depression, unclear if secondary to substances vs primary disorders.  Petition for commitment with [facility] was filed at outside ED. Patient will likely benefit from CD treatment upon discharge.    Hospital summary: Initiated zyprexa on day of admission for treatment of psychosis and mood, 10mg BID. Started prozac 10mg on 5/10 due to symptoms of depression, history of mood improvement on this medication. Initially during hospital stay had nausea and vomiting thought 2/2 withdrawal syndrome,  and after nausea had resolved, had persistent decreased appetite, and was monitored for fluid intake/output, particularly given diabetes diagnosis. Due to continued decreased appetite + abdominal pain, internal medicine consulted on 5/13. Due to insomnia, changed zyprexa dosing from 10mg BID to 5mg + 15mg at bedtime. Increased prozac to 20mg on 5/18 for continued support in managing depressive symptoms. Over the weekend of 5/14/-5/17, was noted to be more active in the milieu, appetite improved. Added gabapentin 300mg TID for neuropathic pain (presumed peripheral neuropathy 2/2 DMII) to good effect 5/18.    Changes today:  -Medicine curbside re: diabetes medication options given that Brody has stomach pain with metformin     Target psychiatric symptoms and interventions:  #Unspecified psychosis (substance-induced vs schizophrenia vs schizoaffective disorder)  #Unspecified depressive disorder (MDD vs substance-induced mood disorder)  #stimulant use disorder  #stimulant use withdrawal  -zyprexa 5mg qam + 15mg at betime  -prozac 20mg daily    Risks, benefits, and alternatives discussed at length with patient.     Acute Medical Problems and Treatments:   #nausea and vomiting  #abdominal pain  #anorexia  -medicine consult x2: thought most likely 2/2 withdrawal syndrome initially. Repeat medicine consult on 5/13 due to continued abdominal pain, anorexia.  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 Recommend f/u with infectious disease as outpatient, order placed for discharge    Chronic conditions:  #DMII  #HTN  -medicine consulted, PTA medications continued      Behavioral/Psychological/Social:  - Encourage unit programming    Safety:  - Continue precautions as noted above  - Status 15 minute checks    Legal Status: court hold    Disposition Plan   Reason for ongoing admission: poses an imminent risk  to self and is unable to care for self due to severe psychosis or artem  Discharge location: Chemical dependency treatment facility  Discharge Medications: not ordered  Follow-up Appointments: not scheduled    Susana Szymanski MD  PGY-2     Patient seen and staffed with attending psychiatrist, Emilee Rao MD              Attending Only